# Patient Record
Sex: FEMALE | Race: WHITE | Employment: FULL TIME | URBAN - METROPOLITAN AREA
[De-identification: names, ages, dates, MRNs, and addresses within clinical notes are randomized per-mention and may not be internally consistent; named-entity substitution may affect disease eponyms.]

---

## 2018-01-08 PROBLEM — Q25.0 PATENT DUCTUS ARTERIOSUS: Status: ACTIVE | Noted: 2018-01-08

## 2018-01-15 ENCOUNTER — HOSPITAL ENCOUNTER (OUTPATIENT)
Dept: CT IMAGING | Age: 58
Discharge: HOME | End: 2018-01-15
Attending: INTERNAL MEDICINE
Payer: COMMERCIAL

## 2018-01-15 VITALS — WEIGHT: 235 LBS | HEIGHT: 67 IN | BODY MASS INDEX: 36.88 KG/M2

## 2018-01-15 DIAGNOSIS — Q25.0 PATENT DUCTUS ARTERIOSUS: ICD-10-CM

## 2018-01-15 PROCEDURE — 74011000258 HC RX REV CODE- 258: Performed by: INTERNAL MEDICINE

## 2018-01-15 PROCEDURE — 74011636320 HC RX REV CODE- 636/320: Performed by: INTERNAL MEDICINE

## 2018-01-15 PROCEDURE — 71275 CT ANGIOGRAPHY CHEST: CPT

## 2018-01-15 RX ORDER — SODIUM CHLORIDE 0.9 % (FLUSH) 0.9 %
10 SYRINGE (ML) INJECTION
Status: COMPLETED | OUTPATIENT
Start: 2018-01-15 | End: 2018-01-15

## 2018-01-15 RX ADMIN — Medication 10 ML: at 09:33

## 2018-01-15 RX ADMIN — SODIUM CHLORIDE 100 ML: 900 INJECTION, SOLUTION INTRAVENOUS at 09:33

## 2018-01-15 RX ADMIN — IOPAMIDOL 119 ML: 755 INJECTION, SOLUTION INTRAVENOUS at 09:33

## 2019-09-24 PROBLEM — F41.1 GENERALIZED ANXIETY DISORDER: Status: ACTIVE | Noted: 2019-09-24

## 2019-10-17 PROBLEM — Q25.0 PATENT DUCTUS ARTERIOSUS: Status: RESOLVED | Noted: 2018-01-08 | Resolved: 2019-10-17

## 2020-02-18 ENCOUNTER — HOSPITAL ENCOUNTER (OUTPATIENT)
Dept: LAB | Age: 60
Discharge: HOME | End: 2020-02-18

## 2020-02-18 PROCEDURE — 88312 SPECIAL STAINS GROUP 1: CPT

## 2020-02-18 PROCEDURE — 88305 TISSUE EXAM BY PATHOLOGIST: CPT

## 2021-08-24 PROBLEM — E78.5 HYPERLIPIDEMIA: Status: ACTIVE | Noted: 2018-12-27

## 2021-08-24 PROBLEM — E66.01 CLASS 2 SEVERE OBESITY DUE TO EXCESS CALORIES WITH SERIOUS COMORBIDITY AND BODY MASS INDEX (BMI) OF 37.0 TO 37.9 IN ADULT (HCC): Status: ACTIVE | Noted: 2020-12-02

## 2021-08-24 PROBLEM — F32.A ANXIETY AND DEPRESSION: Status: ACTIVE | Noted: 2018-12-27

## 2021-08-24 PROBLEM — I25.10 ATHEROSCLEROSIS OF CORONARY ARTERY: Status: ACTIVE | Noted: 2018-12-27

## 2021-08-24 PROBLEM — M50.30 DEGENERATION OF CERVICAL INTERVERTEBRAL DISC: Status: ACTIVE | Noted: 2021-08-24

## 2021-08-24 PROBLEM — G43.009 MIGRAINE WITHOUT AURA AND WITHOUT STATUS MIGRAINOSUS, NOT INTRACTABLE: Status: ACTIVE | Noted: 2019-09-11

## 2021-08-24 PROBLEM — M15.9 GENERALIZED OSTEOARTHRITIS: Status: ACTIVE | Noted: 2021-08-24

## 2021-08-24 PROBLEM — M17.12 ARTHRITIS OF LEFT KNEE: Status: ACTIVE | Noted: 2020-12-02

## 2021-08-24 PROBLEM — F41.9 ANXIETY AND DEPRESSION: Status: ACTIVE | Noted: 2018-12-27

## 2021-08-24 PROBLEM — M51.36 DEGENERATION OF LUMBAR INTERVERTEBRAL DISC: Status: ACTIVE | Noted: 2021-08-24

## 2021-08-24 PROBLEM — R73.01 IMPAIRED FASTING GLUCOSE: Status: ACTIVE | Noted: 2018-12-27

## 2021-11-24 PROBLEM — D36.9 TUBULAR ADENOMA: Status: ACTIVE | Noted: 2021-11-24

## 2021-11-24 PROBLEM — K64.8 INTERNAL HEMORRHOIDS: Status: ACTIVE | Noted: 2021-11-24

## 2022-03-09 PROBLEM — N95.2 ATROPHIC VAGINITIS: Status: ACTIVE | Noted: 2022-03-09

## 2022-03-18 PROBLEM — K64.8 INTERNAL HEMORRHOIDS: Status: ACTIVE | Noted: 2021-11-24

## 2022-03-18 PROBLEM — E78.5 HYPERLIPIDEMIA: Status: ACTIVE | Noted: 2018-12-27

## 2022-03-19 PROBLEM — M51.369 DEGENERATION OF LUMBAR INTERVERTEBRAL DISC: Status: ACTIVE | Noted: 2021-08-24

## 2022-03-19 PROBLEM — N95.2 ATROPHIC VAGINITIS: Status: ACTIVE | Noted: 2022-03-09

## 2022-03-19 PROBLEM — I25.10 ATHEROSCLEROSIS OF CORONARY ARTERY: Status: ACTIVE | Noted: 2018-12-27

## 2022-03-19 PROBLEM — M15.9 GENERALIZED OSTEOARTHRITIS: Status: ACTIVE | Noted: 2021-08-24

## 2022-03-19 PROBLEM — E66.01 CLASS 2 SEVERE OBESITY DUE TO EXCESS CALORIES WITH SERIOUS COMORBIDITY AND BODY MASS INDEX (BMI) OF 37.0 TO 37.9 IN ADULT (HCC): Status: ACTIVE | Noted: 2020-12-02

## 2022-03-19 PROBLEM — F41.9 ANXIETY AND DEPRESSION: Status: ACTIVE | Noted: 2018-12-27

## 2022-03-19 PROBLEM — E66.812 CLASS 2 SEVERE OBESITY DUE TO EXCESS CALORIES WITH SERIOUS COMORBIDITY AND BODY MASS INDEX (BMI) OF 37.0 TO 37.9 IN ADULT: Status: ACTIVE | Noted: 2020-12-02

## 2022-03-19 PROBLEM — F41.1 GENERALIZED ANXIETY DISORDER: Status: ACTIVE | Noted: 2019-09-24

## 2022-03-19 PROBLEM — M50.30 DEGENERATION OF CERVICAL INTERVERTEBRAL DISC: Status: ACTIVE | Noted: 2021-08-24

## 2022-03-19 PROBLEM — M17.12 ARTHRITIS OF LEFT KNEE: Status: ACTIVE | Noted: 2020-12-02

## 2022-03-19 PROBLEM — F32.A ANXIETY AND DEPRESSION: Status: ACTIVE | Noted: 2018-12-27

## 2022-03-19 PROBLEM — M51.36 DEGENERATION OF LUMBAR INTERVERTEBRAL DISC: Status: ACTIVE | Noted: 2021-08-24

## 2022-03-20 PROBLEM — D36.9 TUBULAR ADENOMA: Status: ACTIVE | Noted: 2021-11-24

## 2022-03-20 PROBLEM — G43.009 MIGRAINE WITHOUT AURA AND WITHOUT STATUS MIGRAINOSUS, NOT INTRACTABLE: Status: ACTIVE | Noted: 2019-09-11

## 2022-03-20 PROBLEM — R73.01 IMPAIRED FASTING GLUCOSE: Status: ACTIVE | Noted: 2018-12-27

## 2022-05-04 ENCOUNTER — APPOINTMENT (RX ONLY)
Dept: URBAN - METROPOLITAN AREA CLINIC 329 | Facility: CLINIC | Age: 62
Setting detail: DERMATOLOGY
End: 2022-05-04

## 2022-05-04 DIAGNOSIS — D22 MELANOCYTIC NEVI: ICD-10-CM

## 2022-05-04 DIAGNOSIS — L82.1 OTHER SEBORRHEIC KERATOSIS: ICD-10-CM

## 2022-05-04 DIAGNOSIS — L57.0 ACTINIC KERATOSIS: ICD-10-CM | Status: INADEQUATELY CONTROLLED

## 2022-05-04 DIAGNOSIS — Z71.89 OTHER SPECIFIED COUNSELING: ICD-10-CM

## 2022-05-04 DIAGNOSIS — D485 NEOPLASM OF UNCERTAIN BEHAVIOR OF SKIN: ICD-10-CM

## 2022-05-04 PROBLEM — D22.39 MELANOCYTIC NEVI OF OTHER PARTS OF FACE: Status: ACTIVE | Noted: 2022-05-04

## 2022-05-04 PROBLEM — D48.5 NEOPLASM OF UNCERTAIN BEHAVIOR OF SKIN: Status: ACTIVE | Noted: 2022-05-04

## 2022-05-04 PROBLEM — D22.5 MELANOCYTIC NEVI OF TRUNK: Status: ACTIVE | Noted: 2022-05-04

## 2022-05-04 PROCEDURE — 11102 TANGNTL BX SKIN SINGLE LES: CPT

## 2022-05-04 PROCEDURE — ? COUNSELING

## 2022-05-04 PROCEDURE — 99203 OFFICE O/P NEW LOW 30 MIN: CPT | Mod: 25

## 2022-05-04 PROCEDURE — ? LIQUID NITROGEN

## 2022-05-04 PROCEDURE — 17000 DESTRUCT PREMALG LESION: CPT | Mod: 59

## 2022-05-04 PROCEDURE — ? FULL BODY SKIN EXAM - DECLINED

## 2022-05-04 PROCEDURE — ? BIOPSY BY SHAVE METHOD

## 2022-05-04 PROCEDURE — ? SUNSCREEN RECOMMENDATIONS

## 2022-05-04 ASSESSMENT — LOCATION SIMPLE DESCRIPTION DERM
LOCATION SIMPLE: LEFT FOREHEAD
LOCATION SIMPLE: UPPER BACK
LOCATION SIMPLE: RIGHT UPPER BACK
LOCATION SIMPLE: LEFT FOREHEAD
LOCATION SIMPLE: RIGHT POSTERIOR UPPER ARM
LOCATION SIMPLE: RIGHT UPPER BACK
LOCATION SIMPLE: LEFT UPPER BACK
LOCATION SIMPLE: RIGHT POSTERIOR UPPER ARM
LOCATION SIMPLE: LEFT EYEBROW
LOCATION SIMPLE: LEFT UPPER BACK

## 2022-05-04 ASSESSMENT — LOCATION DETAILED DESCRIPTION DERM
LOCATION DETAILED: RIGHT SUPERIOR UPPER BACK
LOCATION DETAILED: RIGHT MEDIAL UPPER BACK
LOCATION DETAILED: RIGHT PROXIMAL LATERAL POSTERIOR UPPER ARM
LOCATION DETAILED: LEFT INFERIOR MEDIAL UPPER BACK
LOCATION DETAILED: LEFT INFERIOR MEDIAL UPPER BACK
LOCATION DETAILED: LEFT FOREHEAD
LOCATION DETAILED: LEFT INFERIOR FOREHEAD
LOCATION DETAILED: RIGHT MEDIAL UPPER BACK
LOCATION DETAILED: SUPERIOR THORACIC SPINE
LOCATION DETAILED: LEFT SUPERIOR MEDIAL UPPER BACK
LOCATION DETAILED: LEFT CENTRAL EYEBROW
LOCATION DETAILED: LEFT INFERIOR FOREHEAD
LOCATION DETAILED: LEFT FOREHEAD
LOCATION DETAILED: RIGHT SUPERIOR UPPER BACK
LOCATION DETAILED: RIGHT PROXIMAL POSTERIOR UPPER ARM

## 2022-05-04 ASSESSMENT — LOCATION ZONE DERM
LOCATION ZONE: ARM
LOCATION ZONE: TRUNK
LOCATION ZONE: ARM
LOCATION ZONE: FACE
LOCATION ZONE: TRUNK
LOCATION ZONE: FACE

## 2022-05-04 NOTE — PROCEDURE: LIQUID NITROGEN
Render Post-Care Instructions In Note?: no
Consent: The patient's consent was obtained including but not limited to risks of crusting, scabbing, blistering, scarring, darker or lighter pigmentary change, recurrence, incomplete removal and infection.
Show Aperture Variable?: Yes
Post-Care Instructions: I reviewed with the patient in detail post-care instructions. Patient is to wear sunprotection, and avoid picking at any of the treated lesions. Pt may apply Vaseline to crusted or scabbing areas.
Detail Level: Detailed
Duration Of Freeze Thaw-Cycle (Seconds): 0

## 2022-05-18 ENCOUNTER — APPOINTMENT (RX ONLY)
Dept: URBAN - METROPOLITAN AREA CLINIC 329 | Facility: CLINIC | Age: 62
Setting detail: DERMATOLOGY
End: 2022-05-18

## 2022-05-18 PROBLEM — D04.5 CARCINOMA IN SITU OF SKIN OF TRUNK: Status: ACTIVE | Noted: 2022-05-18

## 2022-05-18 PROCEDURE — 17262 DSTRJ MAL LES T/A/L 1.1-2.0: CPT

## 2022-05-18 PROCEDURE — ? CURETTAGE AND DESTRUCTION

## 2022-05-18 PROCEDURE — ? COUNSELING

## 2022-05-18 NOTE — PROCEDURE: CURETTAGE AND DESTRUCTION
Detail Level: Detailed
Number Of Curettages: 3
Size Of Lesion In Cm: 1.2
Size Of Lesion After Curettage: 1.4
Add Intralesional Injection: No
Total Volume (Ccs): 1
Anesthesia Type: 1% lidocaine with epinephrine
Cautery Type: electrodesiccation
What Was Performed First?: Curettage
Final Size Statement: The size of the lesion after curettage was
Additional Information: (Optional): The wound was cleaned, and a pressure dressing was applied.  The patient received detailed post-op instructions.
Consent was obtained from the patient. The risks, benefits and alternatives to therapy were discussed in detail. Specifically, the risks of infection, scarring, bleeding, prolonged wound healing, nerve injury, incomplete removal, allergy to anesthesia and recurrence were addressed. Alternatives to ED&C, such as: surgical removal and XRT were also discussed.  Prior to the procedure, the treatment site was clearly identified and confirmed by the patient. All components of Universal Protocol/PAUSE Rule completed.
Post-Care Instructions: I reviewed with the patient in detail post-care instructions. Patient is to keep the area dry for 48 hours, and not to engage in any swimming until the area is healed. Should the patient develop any fevers, chills, bleeding, severe pain patient will contact the office immediately.
Bill As A Line Item Or As Units: Line Item

## 2022-06-15 ENCOUNTER — PATIENT MESSAGE (OUTPATIENT)
Dept: INTERNAL MEDICINE CLINIC | Facility: CLINIC | Age: 62
End: 2022-06-15

## 2022-06-15 RX ORDER — CYCLOBENZAPRINE HCL 10 MG
TABLET ORAL
Qty: 30 TABLET | Refills: 3 | Status: SHIPPED | OUTPATIENT
Start: 2022-06-15 | End: 2022-10-26 | Stop reason: SDUPTHER

## 2022-06-16 RX ORDER — LISINOPRIL 5 MG/1
5 TABLET ORAL DAILY
Qty: 90 TABLET | Refills: 3 | Status: SHIPPED | OUTPATIENT
Start: 2022-06-16 | End: 2022-09-14

## 2022-06-16 RX ORDER — PANTOPRAZOLE SODIUM 40 MG/1
40 TABLET, DELAYED RELEASE ORAL
Qty: 90 TABLET | Refills: 3 | Status: SHIPPED | OUTPATIENT
Start: 2022-06-16 | End: 2022-10-26 | Stop reason: ALTCHOICE

## 2022-06-16 NOTE — TELEPHONE ENCOUNTER
Patient needs a refill on lisinopril 20 mg   Patient has been on dexilant for several years but the insurance will not cover it  she may can try Protonix due to cost

## 2022-06-20 RX ORDER — LISINOPRIL 20 MG/1
20 TABLET ORAL DAILY
Qty: 90 TABLET | Refills: 3 | Status: SHIPPED | OUTPATIENT
Start: 2022-06-20 | End: 2022-10-14 | Stop reason: SDUPTHER

## 2022-06-20 RX ORDER — PANTOPRAZOLE SODIUM 40 MG/1
40 TABLET, DELAYED RELEASE ORAL
Qty: 30 TABLET | Refills: 5 | Status: SHIPPED | OUTPATIENT
Start: 2022-06-20 | End: 2022-09-14 | Stop reason: SDUPTHER

## 2022-06-20 NOTE — TELEPHONE ENCOUNTER
From: Dayton Iglesias  Sent: 6/16/2022 4:16 PM EDT  To: [facility] Clinical Staff  Subject: Need refills sent to [phone]    Also ask her about the Protonix instead of Dexilant please

## 2022-06-20 NOTE — TELEPHONE ENCOUNTER
Denis More is too expensive - insurance will cover Pantoprazole at a cheaper co pay - pt wants to switch - ( [employer]

## 2022-06-20 NOTE — TELEPHONE ENCOUNTER
Cayden Bales MD 4 minutes ago (5:04 PM)         Hope you had a good [place]. Lisinopril 5 mg was sent in instead of 20 mg  I have never taken 5 mg dont know how it got in the computer.    I have been taking Lisinopril 20 mg  Please send this in to [facility] in [facility] so I can get the correct meds   I would appreciate it so much

## 2022-07-18 ENCOUNTER — E-VISIT (OUTPATIENT)
Dept: INTERNAL MEDICINE CLINIC | Facility: CLINIC | Age: 62
End: 2022-07-18
Payer: COMMERCIAL

## 2022-07-18 PROCEDURE — 99212 OFFICE O/P EST SF 10 MIN: CPT | Performed by: INTERNAL MEDICINE

## 2022-07-18 RX ORDER — HYDROXYZINE PAMOATE 25 MG/1
25 CAPSULE ORAL 3 TIMES DAILY PRN
Qty: 180 CAPSULE | Refills: 1 | Status: SHIPPED | OUTPATIENT
Start: 2022-07-18 | End: 2022-09-14 | Stop reason: SINTOL

## 2022-07-18 RX ORDER — FLUCONAZOLE 150 MG/1
150 TABLET ORAL ONCE
Qty: 1 TABLET | Refills: 0 | Status: SHIPPED | OUTPATIENT
Start: 2022-07-18 | End: 2022-07-18

## 2022-07-18 ASSESSMENT — LIFESTYLE VARIABLES: SMOKING_STATUS: NO, I'VE NEVER SMOKED

## 2022-07-22 ENCOUNTER — PATIENT MESSAGE (OUTPATIENT)
Dept: INTERNAL MEDICINE CLINIC | Facility: CLINIC | Age: 62
End: 2022-07-22

## 2022-07-22 RX ORDER — FLUCONAZOLE 150 MG/1
150 TABLET ORAL ONCE
Qty: 1 TABLET | Refills: 0 | Status: SHIPPED | OUTPATIENT
Start: 2022-07-22 | End: 2022-07-22

## 2022-07-22 NOTE — TELEPHONE ENCOUNTER
From: Lila Childers  To: Dr. Burt Gramajo: 7/22/2022 4:36 AM EDT  Subject: Follow Up    Sorry to report that my yeast infection from the antibiotic has not resolved & is still driving me crazy. Always takes more than 1 pill to get rid of it. Please send in script to [facility] in [facility] for more than 1 pill     Also, not sure why I had to complete the questionnaire earlier in the week & be charged a fee & didnt receive any help from doing so. I feel it didnt have a place for my main concern which is my right ear still hasnt returned to normal. I still have a dull ache on & off & full hearing has not been restored. I also still have a low roar & popping sounds on & off like youre driving in the mountains. Tonight Im up with a sore throat again. I feel 7 days of an antibiotic was not enough to completely get rid of my ear, sinus infections & crud as I call it. So sorry to have to bother you again.  Please help

## 2022-09-14 ENCOUNTER — OFFICE VISIT (OUTPATIENT)
Dept: INTERNAL MEDICINE CLINIC | Facility: CLINIC | Age: 62
End: 2022-09-14
Payer: COMMERCIAL

## 2022-09-14 VITALS
SYSTOLIC BLOOD PRESSURE: 127 MMHG | RESPIRATION RATE: 18 BRPM | HEIGHT: 67 IN | DIASTOLIC BLOOD PRESSURE: 72 MMHG | HEART RATE: 61 BPM | WEIGHT: 230 LBS | BODY MASS INDEX: 36.1 KG/M2

## 2022-09-14 DIAGNOSIS — I10 PRIMARY HYPERTENSION: Primary | ICD-10-CM

## 2022-09-14 DIAGNOSIS — I25.10 CORONARY ARTERY DISEASE INVOLVING NATIVE HEART WITHOUT ANGINA PECTORIS, UNSPECIFIED VESSEL OR LESION TYPE: ICD-10-CM

## 2022-09-14 DIAGNOSIS — R73.01 IMPAIRED FASTING GLUCOSE: ICD-10-CM

## 2022-09-14 DIAGNOSIS — M15.9 GENERALIZED OSTEOARTHRITIS: ICD-10-CM

## 2022-09-14 DIAGNOSIS — R53.83 FATIGUE, UNSPECIFIED TYPE: ICD-10-CM

## 2022-09-14 DIAGNOSIS — F41.9 ANXIETY DISORDER, UNSPECIFIED TYPE: ICD-10-CM

## 2022-09-14 DIAGNOSIS — G43.009 MIGRAINE WITHOUT AURA AND WITHOUT STATUS MIGRAINOSUS, NOT INTRACTABLE: ICD-10-CM

## 2022-09-14 LAB
ALBUMIN SERPL-MCNC: 4.1 G/DL (ref 3.2–4.6)
ALBUMIN/GLOB SERPL: 1.4 {RATIO} (ref 1.2–3.5)
ALP SERPL-CCNC: 84 U/L (ref 50–136)
ALT SERPL-CCNC: 29 U/L (ref 12–65)
ANION GAP SERPL CALC-SCNC: 2 MMOL/L (ref 4–13)
AST SERPL-CCNC: 13 U/L (ref 15–37)
BASOPHILS # BLD: 0.1 K/UL (ref 0–0.2)
BASOPHILS NFR BLD: 1 % (ref 0–2)
BILIRUB SERPL-MCNC: 0.6 MG/DL (ref 0.2–1.1)
BUN SERPL-MCNC: 12 MG/DL (ref 8–23)
CALCIUM SERPL-MCNC: 9.7 MG/DL (ref 8.3–10.4)
CHLORIDE SERPL-SCNC: 108 MMOL/L (ref 101–110)
CHOLEST SERPL-MCNC: 116 MG/DL
CO2 SERPL-SCNC: 28 MMOL/L (ref 21–32)
CREAT SERPL-MCNC: 0.8 MG/DL (ref 0.6–1)
DIFFERENTIAL METHOD BLD: NORMAL
EOSINOPHIL # BLD: 0.2 K/UL (ref 0–0.8)
EOSINOPHIL NFR BLD: 3 % (ref 0.5–7.8)
ERYTHROCYTE [DISTWIDTH] IN BLOOD BY AUTOMATED COUNT: 13.3 % (ref 11.9–14.6)
EST. AVERAGE GLUCOSE BLD GHB EST-MCNC: 117 MG/DL
GLOBULIN SER CALC-MCNC: 2.9 G/DL (ref 2.3–3.5)
GLUCOSE SERPL-MCNC: 106 MG/DL (ref 65–100)
HBA1C MFR BLD: 5.7 % (ref 4.8–5.6)
HCT VFR BLD AUTO: 43.6 % (ref 35.8–46.3)
HDLC SERPL-MCNC: 49 MG/DL (ref 40–60)
HDLC SERPL: 2.4 {RATIO}
HGB BLD-MCNC: 13.9 G/DL (ref 11.7–15.4)
IMM GRANULOCYTES # BLD AUTO: 0 K/UL (ref 0–0.5)
IMM GRANULOCYTES NFR BLD AUTO: 0 % (ref 0–5)
LDLC SERPL CALC-MCNC: 42.4 MG/DL
LYMPHOCYTES # BLD: 2 K/UL (ref 0.5–4.6)
LYMPHOCYTES NFR BLD: 29 % (ref 13–44)
MCH RBC QN AUTO: 28.9 PG (ref 26.1–32.9)
MCHC RBC AUTO-ENTMCNC: 31.9 G/DL (ref 31.4–35)
MCV RBC AUTO: 90.6 FL (ref 79.6–97.8)
MONOCYTES # BLD: 0.5 K/UL (ref 0.1–1.3)
MONOCYTES NFR BLD: 7 % (ref 4–12)
NEUTS SEG # BLD: 4.2 K/UL (ref 1.7–8.2)
NEUTS SEG NFR BLD: 60 % (ref 43–78)
NRBC # BLD: 0 K/UL (ref 0–0.2)
PLATELET # BLD AUTO: 213 K/UL (ref 150–450)
PMV BLD AUTO: 10.2 FL (ref 9.4–12.3)
POTASSIUM SERPL-SCNC: 4.1 MMOL/L (ref 3.5–5.1)
PROT SERPL-MCNC: 7 G/DL (ref 6.3–8.2)
RBC # BLD AUTO: 4.81 M/UL (ref 4.05–5.2)
SODIUM SERPL-SCNC: 138 MMOL/L (ref 136–145)
TRIGL SERPL-MCNC: 123 MG/DL (ref 35–150)
TSH W FREE THYROID IF ABNORMAL: 1.28 UIU/ML (ref 0.36–3.74)
VLDLC SERPL CALC-MCNC: 24.6 MG/DL (ref 6–23)
WBC # BLD AUTO: 7 K/UL (ref 4.3–11.1)

## 2022-09-14 PROCEDURE — 99214 OFFICE O/P EST MOD 30 MIN: CPT | Performed by: INTERNAL MEDICINE

## 2022-09-14 RX ORDER — LANOLIN ALCOHOL/MO/W.PET/CERES
1000 CREAM (GRAM) TOPICAL DAILY
COMMUNITY

## 2022-09-14 RX ORDER — FAMOTIDINE 20 MG/1
20 TABLET, FILM COATED ORAL NIGHTLY PRN
COMMUNITY

## 2022-09-14 RX ORDER — ACETAMINOPHEN 160 MG
TABLET,DISINTEGRATING ORAL DAILY
COMMUNITY

## 2022-09-14 RX ORDER — BUSPIRONE HYDROCHLORIDE 10 MG/1
10 TABLET ORAL 3 TIMES DAILY
Qty: 90 TABLET | Refills: 5 | Status: SHIPPED | OUTPATIENT
Start: 2022-09-14 | End: 2022-10-14

## 2022-09-14 RX ORDER — CYANOCOBALAMIN 1000 UG/ML
1000 INJECTION INTRAMUSCULAR; SUBCUTANEOUS
Qty: 10 ML | Refills: 0 | Status: SHIPPED | OUTPATIENT
Start: 2022-09-14

## 2022-09-14 RX ORDER — DOCUSATE SODIUM 100 MG/1
100 CAPSULE, LIQUID FILLED ORAL DAILY PRN
COMMUNITY

## 2022-09-14 RX ORDER — ASCORBIC ACID 500 MG
500 TABLET ORAL DAILY
COMMUNITY

## 2022-09-14 RX ORDER — OMEPRAZOLE 20 MG/1
20 CAPSULE, DELAYED RELEASE ORAL DAILY
COMMUNITY
End: 2022-10-26 | Stop reason: ALTCHOICE

## 2022-09-14 RX ORDER — GUAIFENESIN 600 MG/1
1200 TABLET, EXTENDED RELEASE ORAL 2 TIMES DAILY
COMMUNITY

## 2022-09-14 RX ORDER — SENNOSIDES 8.6 MG
2600 CAPSULE ORAL EVERY MORNING
COMMUNITY

## 2022-09-14 ASSESSMENT — ENCOUNTER SYMPTOMS
WHEEZING: 0
NAUSEA: 0
COUGH: 0
BLOOD IN STOOL: 0
DIARRHEA: 1
VOMITING: 0
ABDOMINAL PAIN: 1
SHORTNESS OF BREATH: 0
ANAL BLEEDING: 0
CONSTIPATION: 1

## 2022-09-14 NOTE — PROGRESS NOTES
9/14/2022 6:25 PM  Location:[facility] [address] [facility]  [place]  Patient #:  [id number]  YOB: 1960            History of Present Illness     Chief Complaint   Patient presents with    6 Month Follow-Up       Ms. Marlene Chamberlain is a 58 y.o. female  who presents for the above. Had to stop the Dexilant due to insurance issues. Switched to protonix. Had some nausea on this so took omeprazole along with this at a different time of day and this helped. Has been taking medicine for her stomach since her daughter was born. Had endoscopy within the past 2-3 years. Has been checked for H. Pylori and did not have this. Sometimes eating helps her feel better. Feels very tired. Wonders about taking CoQ-10 due to being on a statin. Has been on Trintellix for a long time. Noticed that initially it was very helpful. Has taken B12 shots in the past.  And wonders if this would help with her anxiety. Wonders about a diet pill. Constipation  Associated symptoms include abdominal pain and diarrhea (has this due to medications). Pertinent negatives include no nausea or vomiting. Abdominal Pain  This is a chronic problem. Associated symptoms include constipation and diarrhea (has this due to medications). Pertinent negatives include no nausea or vomiting. Allergies   Allergen Reactions    Latex Rash    Heparin (Bovine) Anaphylaxis     Past Medical History:   Diagnosis Date    Anxiety and depression 12/27/2018    Arthritis of left knee 12/2/2020    Autoimmune disease ([facility] [address]     fibromyalgia    CAD (coronary artery disease) 2006    mi    Chest pain     Coagulation defects     hx of dic ?  pe    GERD (gastroesophageal reflux disease)     Hypertension     Palpitations 8/22/2016    Aug 2014 - K+ 3.4, Mg 2.1 7 day monitor - single 8 beat run of atrial ectopy, asymptomatic Feb 2016 - 7 day monitor - normal tracing, all symptoms with NSR    PUD (peptic ulcer disease) Thromboembolus ([address]     lle and pe's--had had foot surgery; just one time     Social History     Socioeconomic History    Marital status:      Spouse name: None    Number of children: None    Years of education: None    Highest education level: None   Tobacco Use    Smoking status: Never    Smokeless tobacco: Never   Substance and Sexual Activity    Alcohol use: No     Past Surgical History:   Procedure Laterality Date    CARDIAC CATHETERIZATION      CHOLECYSTECTOMY  2002    HEENT      septoplasty,rhinoplasty    [place] CARDIAC SURG PROCEDURE UNLIST      cath stents     Current Outpatient Medications   Medication Sig Dispense Refill    Multiple Vitamin (MULTIVITAMIN ADULT PO) Take by mouth daily      Cholecalciferol (VITAMIN D3) 50 MCG (2000 UT) CAPS Take by mouth daily      zinc 50 MG CAPS Take by mouth daily      vitamin B-12 (CYANOCOBALAMIN) 1000 MCG tablet Take 1,000 mcg by mouth daily      vitamin C (ASCORBIC ACID) 500 MG tablet Take 500 mg by mouth daily      NONFORMULARY D - mannose - 1500 mg once daily ( otc) for uti's      Misc Natural Products (GLUCOSAMINE CHOND CMP TRIPLE PO) Take by mouth daily      QUERCETIN PO Take 800 mg by mouth daily      Desloratadine (CLARINEX PO) Take by mouth      guaiFENesin (MUCINEX) 600 MG extended release tablet Take 1,200 mg by mouth 2 times daily      Pseudoephedrine HCl (SUDAFED 12 HOUR PO) Take by mouth      docusate sodium (COLACE) 100 MG capsule Take 100 mg by mouth daily as needed for Constipation      acetaminophen (TYLENOL 8 HOUR) 650 MG extended release tablet Take 2,600 mg by mouth every morning 4 (650 mg) tablets every morning      famotidine (PEPCID) 20 MG tablet Take 20 mg by mouth nightly as needed      omeprazole (PRILOSEC) 20 MG delayed release capsule Take 20 mg by mouth daily      cyanocobalamin 1000 MCG/ML injection Inject 1 mL into the muscle every 30 days 10 mL 0    busPIRone (BUSPAR) 10 MG tablet Take 1 tablet by mouth 3 times daily 90 tablet 5 linaclotide (LINZESS) 290 MCG CAPS capsule Take 1 capsule by mouth every morning (before breakfast) 8 capsule 0    lisinopril (PRINIVIL;ZESTRIL) 20 MG tablet Take 1 tablet by mouth daily 90 tablet 3    VORTIoxetine HBr (TRINTELLIX) 20 MG TABS tablet Take 1 tablet by mouth daily 30 tablet 5    pantoprazole (PROTONIX) 40 MG tablet Take 1 tablet by mouth every morning (before breakfast) 90 tablet 3    cyclobenzaprine (FLEXERIL) 10 MG tablet TAKE 1 TABLET BY MOUTH THREE TIMES DAILY AS NEEDED FOR MUSCLE SPASMS 30 tablet 3    aspirin 81 MG EC tablet Take 81 mg by mouth daily      butalbital-acetaminophen-caffeine (FIORICET, ESGIC) -40 MG per tablet Take 1 tablet by mouth every 6 hours as needed      clopidogrel (PLAVIX) 75 MG tablet Take 75 mg by mouth daily      LORazepam (ATIVAN) 0.5 MG tablet Take 0.5 mg by mouth 2 times daily as needed. metoprolol succinate (TOPROL XL) 25 MG extended release tablet Take 25 mg by mouth 2 times daily      potassium chloride (KLOR-CON M) 20 MEQ extended release tablet Take 20 mEq by mouth 2 times daily      rosuvastatin (CRESTOR) 5 MG tablet Take by mouth       No current facility-administered medications for this visit.      Health Maintenance   Topic Date Due    HIV screen  Never done    COVID-19 Vaccine (4 - Booster for Moderna series) 03/23/2022    Flu vaccine (1) 09/01/2022    A1C test (Diabetic or Prediabetic)  08/24/2022    Lipids  11/24/2022    Depression Monitoring  03/09/2023    Breast cancer screen  05/18/2024    Cervical cancer screen  03/09/2027    DTaP/Tdap/Td vaccine (2 - Td or Tdap) 03/20/2028    Colorectal Cancer Screen  11/20/2030    Shingles vaccine  Completed    Hepatitis C screen  Completed    Hepatitis A vaccine  Aged Out    Hepatitis B vaccine  Aged Out    Hib vaccine  Aged Out    Meningococcal (ACWY) vaccine  Aged Out    Pneumococcal 0-64 years Vaccine  Aged Out     Family History   Problem Relation Age of Onset    Alzheimer's Disease Mother     Other Mother         bilateral knee replacement    Heart Disease Father     Coronary Art Dis Father     Diabetes Father              Review of Systems  Review of Systems   Respiratory:  Negative for cough, shortness of breath and wheezing. Cardiovascular:  Negative for chest pain, palpitations and leg swelling. Gastrointestinal:  Positive for abdominal pain, constipation and diarrhea (has this due to medications). Negative for anal bleeding, blood in stool, nausea and vomiting. Has been taking some stool softeners over the past 8 years. Takes six of these and six of bisacodyl every night. /72 (Site: Left Upper Arm, Position: Sitting, Cuff Size: Large Adult)   Pulse 61   Resp 18   Ht 5' 7\" (1.702 m)   Wt 230 lb (104.3 kg)   BMI 36.02 kg/m²       Physical Exam    Physical Exam  Constitutional:       Appearance: Normal appearance. She is obese. She is not ill-appearing. HENT:      Head: Normocephalic. Neck:      Vascular: No carotid bruit. Cardiovascular:      Rate and Rhythm: Normal rate and regular rhythm. Pulmonary:      Effort: Pulmonary effort is normal.      Breath sounds: Normal breath sounds. No wheezing. Abdominal:      General: Abdomen is flat. Palpations: Abdomen is soft. Tenderness: There is generalized abdominal tenderness. Musculoskeletal:      Right lower leg: No edema. Left lower leg: No edema. Neurological:      Mental Status: She is alert and oriented to person, place, and time. Deep Tendon Reflexes:      Reflex Scores:       Patellar reflexes are 2+ on the right side and 2+ on the left side.   Psychiatric:         Mood and Affect: Mood normal.         Behavior: Behavior normal.         Assessment & Plan    Current Outpatient Medications   Medication Sig Dispense Refill    Multiple Vitamin (MULTIVITAMIN ADULT PO) Take by mouth daily      Cholecalciferol (VITAMIN D3) 50 MCG (2000 UT) CAPS Take by mouth daily      zinc 50 MG CAPS Take by mouth daily mouth 2 times daily as needed. metoprolol succinate (TOPROL XL) 25 MG extended release tablet Take 25 mg by mouth 2 times daily      potassium chloride (KLOR-CON M) 20 MEQ extended release tablet Take 20 mEq by mouth 2 times daily      rosuvastatin (CRESTOR) 5 MG tablet Take by mouth       No current facility-administered medications for this visit.        Orders Placed This Encounter   Procedures    Lipid Panel     Standing Status:   Future     Number of Occurrences:   1     Standing Expiration Date:   9/14/2023    Comprehensive Metabolic Panel     Standing Status:   Future     Number of Occurrences:   1     Standing Expiration Date:   9/14/2023    Hemoglobin A1C     Standing Status:   Future     Number of Occurrences:   1     Standing Expiration Date:   9/14/2023    CBC with Auto Differential     Standing Status:   Future     Number of Occurrences:   1     Standing Expiration Date:   9/14/2023    TSH with Reflex     Standing Status:   Future     Number of Occurrences:   1     Standing Expiration Date:   9/14/2023       Orders Placed This Encounter   Medications    DISCONTD: linaclotide (LINZESS) 145 MCG capsule     Sig: Take 1 capsule by mouth every morning (before breakfast)     Dispense:  14 capsule     Refill:  0    cyanocobalamin 1000 MCG/ML injection     Sig: Inject 1 mL into the muscle every 30 days     Dispense:  10 mL     Refill:  0    busPIRone (BUSPAR) 10 MG tablet     Sig: Take 1 tablet by mouth 3 times daily     Dispense:  90 tablet     Refill:  5    linaclotide (LINZESS) 290 MCG CAPS capsule     Sig: Take 1 capsule by mouth every morning (before breakfast)     Dispense:  8 capsule     Refill:  0         Medications Discontinued During This Encounter   Medication Reason    azithromycin (ZITHROMAX) 250 MG tablet LIST CLEANUP    pantoprazole (PROTONIX) 40 MG tablet DUPLICATE    predniSONE 10 MG (21) TBPK LIST CLEANUP    acetaminophen (TYLENOL) 500 MG tablet LIST CLEANUP    dexlansoprazole (DEXILANT) 60 MG CPDR delayed release capsule LIST CLEANUP    lisinopril (PRINIVIL;ZESTRIL) 5 MG tablet LIST CLEANUP    nystatin (MYCOSTATIN) 095789 UNIT/ML suspension LIST CLEANUP    linaclotide (LINZESS) 145 MCG capsule DOSE ADJUSTMENT    hydrOXYzine pamoate (VISTARIL) 25 MG capsule Side effects        Diagnosis Orders   1. Primary hypertension  Lipid Panel    Comprehensive Metabolic Panel    CBC with Auto Differential    CBC with Auto Differential    Comprehensive Metabolic Panel    Lipid Panel      2. Coronary artery disease involving native heart without angina pectoris, unspecified vessel or lesion type  CBC with Auto Differential    CBC with Auto Differential      3. Migraine without aura and without status migrainosus, not intractable        4. Impaired fasting glucose  Hemoglobin A1C    Hemoglobin A1C      5. Generalized osteoarthritis  CBC with Auto Differential    CBC with Auto Differential      6. Anxiety disorder, unspecified type        7. Fatigue, unspecified type  TSH with Reflex    TSH with Reflex         It seems possible that this patient has fairly significant IBS. Advised her to try the Linzess as above and to stop bisacodyl in combination with docusate. If she does not tolerate Linzess or if it is ineffective, she can start by reducing from 6 pills of each tonight to 5 pills nightly and gradually decrease over time down to hopefully just to of each nightly. Will discuss labs over the phone. I advised her to go ahead and contact her gastroenterologist for follow-up as well. Diet, exercise and weight loss recommended. Recommended 30 minutes of aerobic exercise at least 4-5 days weekly for physical as well as emotional reasons. Advised her to stop hydroxyzine secondary to her fatigue. Advised that she try buspirone instead. Will maintain close follow-up. Follow-up as above or earlier if needed. Return in about 6 weeks (around 10/26/2022).           Annalise Davila MD

## 2022-09-19 RX ORDER — CLOPIDOGREL BISULFATE 75 MG/1
75 TABLET ORAL DAILY
Qty: 30 TABLET | Refills: 3 | Status: SHIPPED | OUTPATIENT
Start: 2022-09-19 | End: 2022-10-14 | Stop reason: SDUPTHER

## 2022-09-19 NOTE — TELEPHONE ENCOUNTER
Medication Refill Request      Name of Medication : Plavix      Strength of Medication: 75 mg      Directions: 1 daily am      30 day or 90 day supply: 90      Preferred Pharmacy: [facility]    Additional Information For Provider:
Needs to get this from her heart doctor. I refilled, but she needs to schedule follow up appointment with cardiologist to get this through them as well as have regular follow up. Thanks.   Vincenzo Lee
02-Jul-2021 19:35

## 2022-10-13 RX ORDER — METOPROLOL SUCCINATE 25 MG/1
TABLET, EXTENDED RELEASE ORAL
Qty: 180 TABLET | Refills: 3 | Status: SHIPPED | OUTPATIENT
Start: 2022-10-13 | End: 2022-10-14 | Stop reason: SDUPTHER

## 2022-10-14 ENCOUNTER — OFFICE VISIT (OUTPATIENT)
Dept: CARDIOLOGY CLINIC | Age: 62
End: 2022-10-14
Payer: COMMERCIAL

## 2022-10-14 VITALS
HEIGHT: 67 IN | WEIGHT: 229.7 LBS | SYSTOLIC BLOOD PRESSURE: 130 MMHG | HEART RATE: 79 BPM | DIASTOLIC BLOOD PRESSURE: 80 MMHG | BODY MASS INDEX: 36.05 KG/M2

## 2022-10-14 DIAGNOSIS — E78.2 MIXED HYPERLIPIDEMIA: ICD-10-CM

## 2022-10-14 DIAGNOSIS — I10 ESSENTIAL HYPERTENSION: ICD-10-CM

## 2022-10-14 DIAGNOSIS — R00.2 PALPITATIONS: Primary | ICD-10-CM

## 2022-10-14 DIAGNOSIS — F41.1 GENERALIZED ANXIETY DISORDER: ICD-10-CM

## 2022-10-14 DIAGNOSIS — I25.10 CORONARY ARTERY DISEASE INVOLVING NATIVE HEART WITHOUT ANGINA PECTORIS, UNSPECIFIED VESSEL OR LESION TYPE: ICD-10-CM

## 2022-10-14 PROCEDURE — 99214 OFFICE O/P EST MOD 30 MIN: CPT | Performed by: INTERNAL MEDICINE

## 2022-10-14 PROCEDURE — 93000 ELECTROCARDIOGRAM COMPLETE: CPT | Performed by: INTERNAL MEDICINE

## 2022-10-14 RX ORDER — METOPROLOL SUCCINATE 25 MG/1
TABLET, EXTENDED RELEASE ORAL
Qty: 180 TABLET | Refills: 3 | Status: SHIPPED | OUTPATIENT
Start: 2022-10-14

## 2022-10-14 RX ORDER — ROSUVASTATIN CALCIUM 5 MG/1
5 TABLET, COATED ORAL DAILY
Qty: 90 TABLET | Refills: 3 | Status: SHIPPED | OUTPATIENT
Start: 2022-10-14

## 2022-10-14 RX ORDER — CLOPIDOGREL BISULFATE 75 MG/1
75 TABLET ORAL DAILY
Qty: 90 TABLET | Refills: 3 | Status: SHIPPED | OUTPATIENT
Start: 2022-10-14

## 2022-10-14 RX ORDER — LISINOPRIL 20 MG/1
20 TABLET ORAL DAILY
Qty: 90 TABLET | Refills: 3 | Status: SHIPPED | OUTPATIENT
Start: 2022-10-14

## 2022-10-14 ASSESSMENT — ENCOUNTER SYMPTOMS: SHORTNESS OF BREATH: 0

## 2022-10-14 NOTE — PROGRESS NOTES
[facility] CARDIOLOGY  [id number]  [address]  PHONE: [phone]      10/14/22    NAME:  Irvin Jama  : 1960  MRN: [id number]         SUBJECTIVE:   Irvin Jama is a 58 y.o. female seen for a follow up visit regarding the following:     Chief Complaint   Patient presents with    Palpitations              HPI:  Follow up  Palpitations   . Follow up long standing palpitations without significant arrhythmia. Chronic BB therapy. She's been doing well, keeping her young grandchildren, active with them but no other structured exercise. Her biggest struggle remains anxiety. Her prior psychiatrist left the area. She continues with her meds but would like to see a counselor. Manifests itself as racing thoughts, with panic attacks including heart racing, resolves with deep breathing. Feels uncomfortable being alone. Looking to find places to volunteer and keep herself busy. No angina       Past cardiac history:    - provoked PE after ankle fracture - AC and IVC filter    - stent to Dx   Subsequent cath - patent stent    - Cath - patent stent, minimal disease otherwise    - 7 day monitor - single 8 beat run of atrial ectopy, asymptomatic   2016 - 7 day monitor - normal tracing, all symptoms with NSR   2016 - Stress MPI normal stage III   Dec 2017 - Echo otherwise normal suggests flow between PA and aorta just distal to SCA consistent with PDA. CTA ordered, shunt fraction 1:4   CTA - essentially normal study. Ductus \"bump\" is noted, possible small PDA but cannot confirm patency on CT. Normal vascular sizes, structures, filling and drainage. Sep 2019- 7 day monitor - normal tracing, 9 beats asymptomatic atrial tachycardia.   Symptoms fail to correlate with any abnormality   2021 14 day monitor - NSR with occasional PVC/rare PAC, symptoms reported sometimes with ectopy, sometimes with NSR             Key CAD CHF Meds metoprolol succinate (TOPROL XL) 25 MG extended release tablet (Taking)    Sig: TAKE 1 TABLET BY MOUTH TWICE DAILY    lisinopril (PRINIVIL;ZESTRIL) 20 MG tablet (Taking)    Sig - Route: Take 1 tablet by mouth daily - Oral    rosuvastatin (CRESTOR) 5 MG tablet (Taking)    Sig - Route: Take 1 tablet by mouth daily - Oral              Past Medical History, Past Surgical History, Family history, Social History, and Medications were all reviewed with the patient today and updated as necessary. Prior to Admission medications    Medication Sig Start Date End Date Taking?  Authorizing Provider   metoprolol succinate (TOPROL XL) 25 MG extended release tablet TAKE 1 TABLET BY MOUTH TWICE DAILY 10/14/22  Yes Asya Gunn MD   clopidogrel (PLAVIX) 75 MG tablet Take 1 tablet by mouth daily 10/14/22  Yes Asya Gunn MD   lisinopril (PRINIVIL;ZESTRIL) 20 MG tablet Take 1 tablet by mouth daily 10/14/22  Yes Asya Gunn MD   rosuvastatin (CRESTOR) 5 MG tablet Take 1 tablet by mouth daily 10/14/22  Yes Asya Gunn MD   Multiple Vitamin (MULTIVITAMIN ADULT PO) Take by mouth daily   Yes Historical Provider, MD   Cholecalciferol (VITAMIN D3) 50 MCG (2000 UT) CAPS Take by mouth daily   Yes Historical Provider, MD   zinc 50 MG CAPS Take by mouth daily   Yes Historical Provider, MD   vitamin B-12 (CYANOCOBALAMIN) 1000 MCG tablet Take 1,000 mcg by mouth daily   Yes Historical Provider, MD   vitamin C (ASCORBIC ACID) 500 MG tablet Take 500 mg by mouth daily   Yes Historical Provider, MD   NONFORMULARY D - mannose - 1500 mg once daily ( otc) for uti's   Yes Historical Provider, MD   Misc Natural Products (GLUCOSAMINE CHOND CMP TRIPLE PO) Take by mouth daily   Yes Historical Provider, MD   QUERCETIN PO Take 800 mg by mouth daily   Yes Historical Provider, MD   guaiFENesin (MUCINEX) 600 MG extended release tablet Take 1,200 mg by mouth 2 times daily   Yes Historical Provider, MD   Pseudoephedrine HCl (SUDAFED 12 HOUR PO) Take by mouth   Yes Historical Provider, MD   docusate sodium (COLACE) 100 MG capsule Take 100 mg by mouth daily as needed for Constipation   Yes Historical Provider, MD   acetaminophen (TYLENOL) 650 MG extended release tablet Take 2,600 mg by mouth every morning 4 (650 mg) tablets every morning   Yes Historical Provider, MD   famotidine (PEPCID) 20 MG tablet Take 20 mg by mouth nightly as needed   Yes Historical Provider, MD   omeprazole (PRILOSEC) 20 MG delayed release capsule Take 20 mg by mouth daily   Yes Historical Provider, MD   cyanocobalamin 1000 MCG/ML injection Inject 1 mL into the muscle every 30 days 9/14/22  Yes Isacc Cintron MD   busPIRone (BUSPAR) 10 MG tablet Take 1 tablet by mouth 3 times daily 9/14/22 10/14/22 Yes Isacc Cintron MD   VORTIoxetine HBr (TRINTELLIX) 20 MG TABS tablet Take 1 tablet by mouth daily 6/16/22  Yes Lucia Negron MD   pantoprazole (PROTONIX) 40 MG tablet Take 1 tablet by mouth every morning (before breakfast) 6/16/22  Yes Lucia Negron MD   cyclobenzaprine (FLEXERIL) 10 MG tablet TAKE 1 TABLET BY MOUTH THREE TIMES DAILY AS NEEDED FOR MUSCLE SPASMS 6/15/22  Yes Torie Crawford MD   aspirin 81 MG EC tablet Take 81 mg by mouth daily 8/15/17  Yes Ar Automatic Reconciliation   butalbital-acetaminophen-caffeine (FIORICET, ESGIC) -40 MG per tablet Take 1 tablet by mouth every 6 hours as needed 3/9/22  Yes Ar Automatic Reconciliation   LORazepam (ATIVAN) 0.5 MG tablet Take 0.5 mg by mouth 2 times daily as needed.  3/9/22  Yes Ar Automatic Reconciliation   potassium chloride (KLOR-CON M) 20 MEQ extended release tablet Take 20 mEq by mouth 2 times daily 8/15/17  Yes Ar Automatic Reconciliation   Desloratadine (CLARINEX PO) Take by mouth  Patient not taking: Reported on 10/14/2022    Historical Provider, MD   linaclotide (LINZESS) 290 MCG CAPS capsule Take 1 capsule by mouth every morning (before breakfast)  Patient not taking: Reported on 10/14/2022 9/14/22   Mauro Ha MD     Allergies   Allergen Reactions    Latex Rash    Heparin (Bovine) Anaphylaxis     Past Medical History:   Diagnosis Date    Anxiety and depression 12/27/2018    Arthritis of left knee 12/2/2020    Autoimmune disease ([facility] [address]     fibromyalgia    CAD (coronary artery disease) 2006    mi    Chest pain     Coagulation defects     hx of dic ? pe    GERD (gastroesophageal reflux disease)     Hypertension     Palpitations 8/22/2016    Aug 2014 - K+ 3.4, Mg 2.1 7 day monitor - single 8 beat run of atrial ectopy, asymptomatic Feb 2016 - 7 day monitor - normal tracing, all symptoms with NSR    PUD (peptic ulcer disease)     Thromboembolus ([address]     lle and pe's--had had foot surgery; just one time     Past Surgical History:   Procedure Laterality Date    CARDIAC CATHETERIZATION      CHOLECYSTECTOMY  2002    HEENT      septoplasty,rhinoplasty    NE CARDIAC SURG PROCEDURE UNLIST      cath stents     Family History   Problem Relation Age of Onset    Alzheimer's Disease Mother     Other Mother         bilateral knee replacement    Heart Disease Father     Coronary Art Dis Father     Diabetes Father      Social History     Tobacco Use    Smoking status: Never    Smokeless tobacco: Never   Substance Use Topics    Alcohol use: No       ROS:    Review of Systems   Cardiovascular:  Negative for chest pain. Respiratory:  Negative for shortness of breath. Psychiatric/Behavioral:  The patient is nervous/anxious.          PHYSICAL EXAM:   /80   Pulse 79   Ht 5' 7\" (1.702 m)   Wt 229 lb 11.2 oz (104.2 kg)   BMI 35.98 kg/m²      Wt Readings from Last 3 Encounters:   10/14/22 229 lb 11.2 oz (104.2 kg)   09/14/22 230 lb (104.3 kg)   03/09/22 233 lb (105.7 kg)     BP Readings from Last 3 Encounters:   10/14/22 130/80   09/14/22 127/72   03/09/22 134/73     Pulse Readings from Last 3 Encounters:   10/14/22 79   09/14/22 61   03/09/22 82           Physical Exam  Constitutional: Appearance: Normal appearance. HENT:      Head: Normocephalic and atraumatic. Eyes:      Conjunctiva/sclera: Conjunctivae normal.   Neck:      Vascular: No carotid bruit. Cardiovascular:      Rate and Rhythm: Normal rate and regular rhythm. Heart sounds: No murmur heard. No friction rub. No gallop. Pulmonary:      Effort: No respiratory distress. Breath sounds: No wheezing or rales. Musculoskeletal:         General: No swelling. Cervical back: Neck supple. Skin:     General: Skin is warm and dry. Neurological:      General: No focal deficit present. Mental Status: She is alert. Psychiatric:         Mood and Affect: Mood normal.         Behavior: Behavior normal.       Medical problems and test results were reviewed with the patient today. DATA REVIEW    LIPID PANEL     Lab Results   Component Value Date    CHOL 116 09/14/2022    CHOL 113 11/24/2021     Lab Results   Component Value Date    TRIG 123 09/14/2022    TRIG 138 11/24/2021     Lab Results   Component Value Date    HDL 49 09/14/2022    HDL 41 11/24/2021     Lab Results   Component Value Date    LDLCALC 42.4 09/14/2022    LDLCALC 48 11/24/2021     Lab Results   Component Value Date    LABVLDL 24.6 (H) 09/14/2022    VLDL 24 11/24/2021     Lab Results   Component Value Date    CHOLHDLRATIO 2.4 09/14/2022       BMP  Lab Results   Component Value Date/Time     09/14/2022 12:00 PM    K 4.1 09/14/2022 12:00 PM     09/14/2022 12:00 PM    CO2 28 09/14/2022 12:00 PM    BUN 12 09/14/2022 12:00 PM    CREATININE 0.80 09/14/2022 12:00 PM    GLUCOSE 106 09/14/2022 12:00 PM    CALCIUM 9.7 09/14/2022 12:00 PM          EKG    Sinus  Rhythm 79  normal axis, intervals  nstwf      CXR/IMAGING        DEVICE INTERROGATION        OUTSIDE RECORDS REVIEW    Records from outside providers have been reviewed and summarized as noted in the HPI, past history and data review sections of this note, and reviewed with patient. Dawood Noriega ASSESSMENT and PLAN    Talat Bonilla was seen today for palpitations. Diagnoses and all orders for this visit:    Palpitations  -     EKG 12 Lead    Coronary artery disease involving native heart without angina pectoris, unspecified vessel or lesion type    Mixed hyperlipidemia    Essential hypertension    Generalized anxiety disorder  -     [address] (Counseling)    Other orders  -     metoprolol succinate (TOPROL XL) 25 MG extended release tablet; TAKE 1 TABLET BY MOUTH TWICE DAILY  -     clopidogrel (PLAVIX) 75 MG tablet; Take 1 tablet by mouth daily  -     lisinopril (PRINIVIL;ZESTRIL) 20 MG tablet; Take 1 tablet by mouth daily  -     rosuvastatin (CRESTOR) 5 MG tablet; Take 1 tablet by mouth daily        IMPRESSION:    Cardiac status is stable, encouraged efforts to exercise and discussed methods with knee arthritis    BP at target, continue meds    Lipids at goal, continue meds      She requests counseling referral I'm happy to provide, though I know no providers in her area, she will see if [facility] providers will work or will check around her local area and let us know        Return in about 1 year (around 10/14/2023). Thank you for allowing me to participate in this patient's care. Please call or contact me if there are any questions or concerns regarding the above.       Jessica Kate MD  10/14/22  4:04 PM

## 2022-10-14 NOTE — PATIENT INSTRUCTIONS
Patient Education        Learning About Mindfulness for Stress  What are mindfulness and stress? Stress is what you feel when you have to handle more than you are used to. A lot of things can cause stress. You may feel stress when you go on a job interview, take a test, or run a race. This kind of short-term stress is normal and even useful. It can help you if you need to work hard or react quickly. Stress also can last a long time. Long-term stress is caused by stressful situations or events. Examples of long-term stress include long-term health problems, ongoing problems at work, and conflicts in your family. Long-term stress can harm your health. Mindfulness is a focus only on things happening in the present moment. It's a process of purposefully paying attention to and being aware of your surroundings, your emotions, your thoughts, and how your body feels. You are aware of these things, but you aren't judging these experiences as \"good\" or \"bad. \" Mindfulness can help you learn to calm your mind and body to help you cope with illness, pain, and stress. How does mindfulness help to relieve stress? Mindfulness can help quiet your mind and relax your body. Studies show that it can help some people sleep better, feel less anxious, and bring their blood pressure down. And it's been shown to help some people live and cope better with certain health problems like heart disease, depression, chronic pain, and cancer. How do you practice mindfulness? To be mindful is to pay attention, to be present, and to be accepting. When you're mindful, you do just one thing and you pay close attention to that one thing. For example, you may sit quietly and notice your emotions or how your food tastes and smells. When you're present, you focus on the things that are happening right now. You let go of your thoughts about the past and the future.  When you dwell on the past or the future, you miss moments that can heal and strengthen you. You may miss moments like hearing a child laugh or seeing a friendly face when you think you're all alone. When you're accepting, you don't  the present moment. Instead you accept your thoughts and feelings as they come. You can practice anytime, anywhere, and in any way you choose. You can practice in many ways. Here are a few ideas:  While doing your chores, like washing the dishes, let your mind focus on what's in your hand. What does the dish feel like? Is the water warm or cold? Go outside and take a few deep breaths. What is the air like? Is it warm or cold? When you can, take some time at the start of your day to sit alone and think. Take a slow walk by yourself. Count your steps while you breathe in and out. Try yoga breathing exercises, stretches, and poses to strengthen and relax your muscles. At work, if you can, try to stop for a few moments each hour. Note how your body feels. Let yourself regroup and let your mind settle before you return to what you were doing. If you struggle with anxiety or \"worry thoughts,\" imagine your mind as a blue danii and your worry thoughts as clouds. Now imagine those worry thoughts floating across your mind's danii. Just let them pass by as you watch. Follow-up care is a key part of your treatment and safety. Be sure to make and go to all appointments, and call your doctor if you are having problems. It's also a good idea to know your test results and keep a list of the medicines you take. Where can you learn more? Go to [email or web address] and sign in to your [employer] account. Enter W254 in the G[employer]ight box to learn more about \"Learning About Mindfulness for Stress. \"     If you do not have an account, please click on the \"Sign Up Now\" link. Current as of: February 9, 2022               Content Version: 13.4  © 1055-5420 Healthwise, Incorporated. Care instructions adapted under license by [facility] ([facility]).  If you have questions about a medical condition or this instruction, always ask your healthcare professional. [address] any warranty or liability for your use of this information. Patient Education        Kenia Carpenter de la dieta mediterránea  Learning About the [address] Diet  ¿Qué es la dieta mediterránea? La dieta mediterránea es un estilo de alimentación, en lugar de un plan de dieta. Consiste en alimentos consumidos en Emma Pellant, el rukhsana de [place] y [place], y otros países a lo kwadwo del [facility]. Resalta comer alimentos daljit pescado, frutas, verduras, frijoles (habichuelas), panes ricos en fibra y granos integrales, franco secos y aceite de [place]. Pratima estilo de alimentación incluye comer cantidades limitadas de carne Randye Camilo y dulces. ¿Por qué elegir la dieta mediterránea? Camila Hammans de estilo mediterráneo puede mejorar la poncho del corazón. Contiene más grasa que otras dietas saludables para el corazón. Jone las grasas provienen principalmente de franco secos, aceites no saturados (daljit los aceites del pescado y el aceite de [place]) y ciertos aceites de franco secos o semillas (tales daljit el aceite de canola, soya o [place]). Estas grasas pueden ayudar a proteger Donney Grew y los vasos sanguíneos. ¿Cómo puede usted comenzar kelby dieta mediterránea? Estas son algunas cosas que usted puede hacer para cambiar a un estilo de alimentación más similar a la dieta mediterránea. Qué comer  Coma kelby variedad de frutas y verduras cada día, tales daljit uvas, arándanos, tomates, brócoli, pimientos, higos, aceitunas, espinacas, berenjenas, frijoles, lentejas y garbanzos. Coma kelby variedad de alimentos de grano integral cada día, tales daljit breanna, arroz integral y pan, pasta y cuscús integrales. Coma pescado al menos 2 veces a la semana. Pruebe el atún, el salmón, la caballa, la [place], el arenque o las ronda.   Consuma cantidades moderadas de productos lácteos bajos en grasa, daljit Poonam Schillings o yogur. Consuma cantidades moderadas de aves y [place]. Elija grasas saludables (insaturadas), daljit franco secos, aceite de partida y ciertos aceites de franco secos o semillas, daljit el de canola (colza), soya y Stormy Valpar[place]o. Limite las grasas no saludables (saturadas), daljit [place], aceite de cueva y aceite de [place]. Y limite las grasas de origen animal, daljit carne y productos lácteos elaborados con Kaleen Ramp entera. Trate de comer kelsie  solo unas pocas veces al mes en cantidades muy pequeñas. Limite los dulces y postres a solo un par de veces a la semana. [place] incluye bebidas BluPanda. La dieta mediterránea también puede incluir vino tinto con las comidas: 1 vaso cada día para las mujeres y WATZING 2 vasos al día para los hombres. Consejos al comer en casa  Utilice hierbas, especias, ajo, corteza de massiel y [place] de cítricos en lugar de sal para jono sabor a los alimentos. Añada rodajas de aguacate a delacruz sándwich en lugar de tocino. Consuma pescado en el almuerzo o la fernando en lugar de carne Andi Shackleton. Frote el pescado con aceite de partida y cocínelo al horno o a la jessica. [place]vore[place] la ensalada con semillas o franco secos en lugar de queso. Cocine con aceite de partida o de canola en lugar de mantequilla o aceites con alto contenido de grasas saturadas. Cambie de Kaleen Ramp al 2% o entera a leche al 1% o descremada. Unte las verduras crudas en un aderezo de vinagreta o puré de garbanzos en lugar de salsas hechas de mayonesa o crema agria. Disfrute un pedazo de fruta para el postre en lugar de un pedazo de torta. Pruebe manzanas al horno, o coma algunas frutas secas. Consejos al comer afuera  Pruebe pescado cocido, asado a la jessica u horneado, en lugar de comerlo frito o empanado. Pídale al vianey ([facility]) que le preparen la comida con aceite de partida en [facility] de [place]. Pida platos hechos con salsa marinera o salsas hechas con aceite de partida. Evite las salsas hechas con Terrie Klein. Elija panes integrales, pasta y masa de pizza hechas de meliza integral, arroz integral, frijoles y lentejas. Reduzca el consumo de mantequilla o margarina en el pan. En delacruz lugar, puede untar el pan en kelby pequeña cantidad de aceite de partida. [place] acompañamiento, pida kelby ensalada pequeña o verduras asadas en lugar de liz fritas. ¿Dónde puede encontrar más información en inglés? Mercedes Cavazos a [email or web address] e ingrese a delacruz cuenta de MyChart. Bernardino Jung P978 en el [facility] \"Search Health Information\" para más información (en inglés) sobre \"Aprenda acerca de la dieta mediterránea. \"     Si no tiene kelby cuenta, brittney frank en el enlace \"Sign Up Now\". Revisado: 5 [place], 46               Versión del contenido: 13.4  © 6330-7157 Healthwise, Incorporated. Las instrucciones de cuidado fueron adaptadas bajo licencia por [facility] CARE ([facility]). Si usted tiene [place]in [place] afección médica o sobre estas instrucciones, siempre pregunte a delacruz profesional de poncho. Healthwise, Incorporated niega toda garantía o responsabilidad por delacruz uso de esta información. Patient Education        Cyrus Ward de la dieta mediterránea  Learning About the [address] Diet  ¿Qué es la dieta mediterránea? La dieta mediterránea es un estilo de alimentación, en lugar de un plan de dieta. Consiste en alimentos consumidos en Colan D[place], el rukhsana de [place] y [place], y otros países a lo kwadwo del [facility]. Resalta comer alimentos daljit pescado, frutas, verduras, frijoles (habichuelas), panes ricos en fibra y granos integrales, franco secos y aceite de [place]. Pratima estilo de alimentación incluye comer cantidades limitadas de wily Ortiz. ¿Por qué elegir la dieta mediterránea? Mayra Ally de estilo mediterráneo puede mejorar la poncho del corazón. Contiene más grasa que otras dietas saludables para el corazón.  Jone las grasas provienen principalmente de franco secos, aceites no saturados (daljit los aceites del pescado y el aceite de [place]) y ciertos aceites de franco secos o semillas (tales daljit el aceite de canola, soya o Maurice Gins). Estas grasas pueden ayudar a proteger Delrae Plater y los vasos sanguíneos. ¿Cómo puede usted comenzar kelby dieta mediterránea? Estas son algunas cosas que usted puede hacer para cambiar a un estilo de alimentación más similar a la dieta mediterránea. Qué comer  Coma kelby variedad de frutas y verduras cada día, tales daljit uvas, arándanos, tomates, brócoli, pimientos, higos, aceitunas, espinacas, berenjenas, frijoles, lentejas y garbanzos. Coma kelby variedad de alimentos de grano integral cada día, tales daljit breanna, arroz integral y pan, pasta y cuscús integrales. Coma pescado al menos 2 veces a la semana. Pruebe el atún, el salmón, la caballa, la [place], el arenque o las ronda. Consuma cantidades moderadas de productos lácteos bajos en grasa, daljit Isabel Moder o yogur. Consuma cantidades moderadas de aves y [place]. Elija grasas saludables (insaturadas), daljit franco secos, aceite de partida y ciertos aceites de franco secos o semillas, daljit el de canola (colza), soya y Troy Gins. Limite las grasas no saludables (saturadas), daljit [place], aceite de cueva y aceite de [place]. Y limite las grasas de origen animal, daljit carne y productos lácteos elaborados con Amrita Parmar entersidra. Trate de comer kelsie  solo unas pocas veces al mes en cantidades muy pequeñas. Limite los dulces y postres a solo un par de veces a la semana. [place] incluye bebidas [place]. La dieta mediterránea también puede incluir vino tinto con las comidas: 1 vaso cada día para las mujeres y WATZING 2 vasos al día para los hombres. Consejos al comer en casa  Utilice hierbas, especias, ajo, corteza de massiel y [place] de cítricos en lugar de sal para jono sabor a los alimentos. Añada rodajas de aguacate a delacruz sándwich en lugar de tocino.   Consuma pescado en el almuerzo o la fernando en lugar de [facility]. Frote el pescado con aceite de partida y cocínelo al horno o a la jessica. [place]voree la ensalada con semillas o franco secos en lugar de [facility]. Cocine con aceite de partida o de canola en lugar de mantequilla o aceites con alto contenido de grasas saturadas. Cambie de [place] al 2% o entera a leche al 1% o descremada. Unte las verduras crudas en un aderezo de vinagreta o puré de garbanzos en lugar de salsas hechas de mayonesa o crema agria. Disfrute un pedazo de fruta para el postre en lugar de un pedazo de torta. Pruebe manzanas al horno, o coma algunas frutas secas. Consejos al comer afuera  Pruebe pescado cocido, asado a la jessica u horneado, en lugar de comerlo frito o empanado. Pídale al camarero ([facility]) que le preparen la comida con aceite de partida en gar [facility]. Pida platos hechos con salsa marinera o salsas hechas con aceite de partida. Evite las salsas hechas con Prudy Monae. Elija panes integrales, pasta y masa de pizza hechas de meliza integral, arroz integral, frijoles y lentejas. Reduzca el consumo de mantequilla o margarina en el pan. En delacruz lugar, puede untar el pan en kelby pequeña cantidad de aceite de partida. Adam acompañamiento, pida kelby ensalada pequeña o verduras asadas en lugar de liz fritas. ¿Dónde puede encontrar más información en inglés? Andres Tellez a [email or web address] e ingrese a delacruz cuenta de Niurka Huitron I288 en el Kasia Ailin \"Search Health Information\" para más información (en inglés) sobre \"Aprenda acerca de la dieta mediterránea. \"     Si no tiene kelby cuenta, brittney frank en el enlace \"Sign Up Now\". Revisado: 5 [place], 46               Versión del contenido: 13.4  © 6164-0088 Healthwise, [employer]. Las instrucciones de cuidado fueron adaptadas bajo licencia por [address] Si usted tiene [place]ee Lismore afección médica o sobre estas instrucciones, siempre pregunte a delacruz profesional de poncho. [facility], Incorporated niega toda garantía o responsabilidad por delacruz uso de esta información. Patient Education        Allegra Baltazar de la dieta mediterránea  Learning About the [address] [facility]  ¿Qué es la dieta mediterránea? La dieta mediterránea es un estilo de alimentación, en lugar de un plan de dieta. Consiste en alimentos consumidos en [facility], el rukhsana de [place] y [facility], y otros países a lo kwadwo del [facility]. Resalta comer alimentos daljit pescado, frutas, verduras, frijoles (habichuelas), panes ricos en fibra y granos integrales, franco secos y aceite de [place]. Pratima estilo de alimentación incluye comer cantidades limitadas de carne Shiraz Bhumi y dulces. ¿Por qué elegir la dieta mediterránea? Alcario Crosser de estilo mediterráneo puede mejorar la poncho del corazón. Contiene más grasa que otras dietas saludables para el corazón. Jone las grasas provienen principalmente de franco secos, aceites no saturados (daljit los aceites del pescado y el aceite de [place]) y ciertos aceites de franco secos o semillas (tales daljit el aceite de canola, soya o Precilla Blower). Estas grasas pueden ayudar a proteger Ramonia Cola y los vasos sanguíneos. ¿Cómo puede usted comenzar kelby dieta mediterránea? Estas son algunas cosas que usted puede hacer para cambiar a un estilo de alimentación más similar a la dieta mediterránea. Qué comer  Coma kelby variedad de frutas y verduras cada día, tales daljit uvas, arándanos, tomates, brócoli, pimientos, higos, aceitunas, espinacas, berenjenas, frijoles, lentejas y garbanzos. Coma kelby variedad de alimentos de grano integral cada día, tales daljit breanna, arroz integral y pan, pasta y cuscús integrales. Coma pescado al menos 2 veces a la semana. Pruebe el atún, el salmón, la caballa, la [place], el arenque o las ronda. Consuma cantidades moderadas de productos lácteos bajos en grasa, daljit Barker Polo o yogur. Consuma cantidades moderadas de aves y [place].   Jonny Bean grasas saludables (insaturadas), daljit franco secos, aceite de partida y ciertos aceites de franco secos o semillas, daljit el de canola (colza), soya y Judeth [place]. Limite las grasas no saludables (saturadas), daljit [place], aceite de cueva y aceite de [place]. Y limite las grasas de origen animal, daljit carne y productos lácteos elaborados con Candy recioa. Trate de comer kelsie  solo unas pocas veces al mes en cantidades muy pequeñas. Limite los dulces y postres a solo un par de veces a la semana. [place] incluye bebidas [employer]. La dieta mediterránea también puede incluir vino tinto con las comidas: 1 vaso cada día para las mujeres y WATZING 2 vasos al día para los hombres. Consejos al comer en casa  Utilice hierbas, especias, ajo, corteza de massiel y [place] de cítricos en lugar de sal para jono sabor a los alimentos. Añada rodajas de aguacate a delacruz sándwich en lugar de tocino. Consuma pescado en el almuerzo o la fernando en lugar de carne [facility]. Frote el pescado con aceite de partida y cocínelo al horno o a la jessica. [place]voree la ensalada con semillas o franco secos en lugar de queso. Cocine con aceite de partida o de canola en lugar de mantequilla o aceites con alto contenido de grasas saturadas. Cambie de Candy Mon[place] al 2% o entera a leche al 1% o descremada. Unte las verduras crudas en un aderezo de vinagreta o puré de garbanzos en lugar de salsas hechas de mayonesa o crema agria. Disfrute un pedazo de fruta para el postre en lugar de un pedazo de torta. Pruebe manzanas al horno, o coma algunas frutas secas. Consejos al comer afuera  Pruebe pescado cocido, asado a la jessica u horneado, en lugar de comerlo frito o empanado. Pídale al [facility]) que le preparen la comida con aceite de partida en lugar de [place]. Pida platos hechos con salsa marinera o salsas hechas con aceite de partida. Evite las salsas hechas con Stephany Hodgkins.   Elija panes integrales, pasta y masa de pizza hechas de meliza integral, arroz integral, frijoles y lentejas. Reduzca el consumo de mantequilla o margarina en el pan. En delacruz lugar, puede untar el pan en kelby pequeña cantidad de aceite de partida. [place] acompañamiento, pida kelby ensalada pequeña o verduras asadas en lugar de liz fritas. ¿Dónde puede encontrar más información en inglés? Ning Bro a [email or web address] e ingrese a delacruz cuenta de MyChart. Jun Mago F987 en el [facility] \"Search Health Information\" para más información (en inglés) sobre \"Aprenda acerca de la dieta mediterránea. \"     Si no tiene kelby cuenta, brittney frank en el enlace \"Sign Up Now\". Revisado: 5 [place], 46               Versión del contenido: 13.4  © 6479-4677 Healthwise, Incorporated. Las instrucciones de cuidado fueron adaptadas bajo licencia por [facility] ([facility]). Si usted tiene [place] afección médica o sobre estas instrucciones, siempre pregunte a delacruz profesional de poncho. Healthwise, Incorporated niega toda garantía o responsabilidad por delacruz uso de esta información. Patient Education        Carrington Dill de la dieta mediterránea  Learning About the [address] Diet  ¿Qué es la dieta mediterránea? La dieta mediterránea es un estilo de alimentación, en lugar de un plan de dieta. Consiste en alimentos consumidos en Esther Thomas, el rukhsana de [place] y [place], y otros países a lo kwadwo del [facility]. Resalta comer alimentos daljit pescado, frutas, verduras, frijoles (habichuelas), panes ricos en fibra y granos integrales, franco secos y aceite de [place]. Pratima estilo de alimentación incluye comer cantidades limitadas de carne Sole Landers y marya. ¿Por qué elegir la dieta mediterránea? Kaushal Sernaehn de estilo mediterráneo puede mejorar la poncho del corazón. Contiene más grasa que otras dietas saludables para el corazón.  Jone las grasas provienen principalmente de franco secos, aceites no saturados (daljit los aceites del pescado y el aceite de [place]) y ciertos aceites de franco secos o semillas (tales daljit el aceite de canola, soya o [place]). Estas grasas pueden ayudar a proteger Liz Honey y los vasos sanguíneos. ¿Cómo puede usted comenzar kelby dieta mediterránea? Estas son algunas cosas que usted puede hacer para cambiar a un estilo de alimentación más similar a la dieta mediterránea. Qué comer  Coma kelby variedad de frutas y verduras cada día, tales daljit uvas, arándanos, tomates, brócoli, pimientos, higos, aceitunas, espinacas, berenjenas, frijoles, lentejas y garbanzos. Coma kelby variedad de alimentos de grano integral cada día, tales daljit breanna, arroz integral y pan, pasta y cuscús integrales. Coma pescado al menos 2 veces a la semana. Pruebe el atún, el salmón, la caballa, la [place]e [place], el arenque o las ronda. Consuma cantidades moderadas de productos lácteos bajos en grasa, daljit Nato Nat o yogur. Consuma cantidades moderadas de aves y [place]. Elija grasas saludables (insaturadas), daljit franco secos, aceite de partida y ciertos aceites de franco secos o semillas, daljit el de canola (colza), soya y [place]nam Snow. Limite las grasas no saludables (saturadas), daljit [place], aceite de cueva y aceite de [place]. Y limite las grasas de origen animal, daljit carne y productos lácteos elaborados con Francis sweeney. Trate de comer kelsie  solo unas pocas veces al mes en cantidades muy pequeñas. Limite los dulces y postres a solo un par de veces a la semana. [place] incluye bebidas Skipo. La dieta mediterránea también puede incluir vino tinto con las comidas: 1 vaso cada día para las mujeres y WATZING 2 vasos al día para los hombres. Consejos al comer en casa  Utilice hierbas, especias, ajo, corteza de massiel y [place] de cítricos en lugar de sal para jono sabor a los alimentos. Añada rodajas de aguacate a delacruz sándwich en lugar de tocino. Consuma pescado en el almuerzo o la fernando en lugar de carne Annikae Coupe.  Frote el pescado con aceite de partida y cocínelo al horno o a la jessica. [place] la ensalada con semillas o franco secos en lugar de ques. Cocine con aceite de partida o de canola en lugar de mantequilla o aceites con alto contenido de grasas saturadas. Cambie de [place] al 2% o entera a leche al 1% o descremada. Unte las verduras crudas en un aderezo de vinagreta o puré de garbanzos en lugar de salsas hechas de mayonesa o crema agria. Disfrute un pedazo de fruta para el postre en lugar de un pedazo de torta. Pruebe manzanas al horno, o coma algunas frutas secas. Consejos al comer afuera  Pruebe pescado cocido, asado a la jessica u horneado, en lugar de comerlo frito o empanado. Pídale al [facility]) que le preparen la comida con aceite de partida en lugar de [place]. Pida platos hechos con salsa marinera o salsas hechas con aceite de partida. Evite las salsas hechas con Chong Stephaniesher. Elija panes integrales, pasta y masa de pizza hechas de meliza integral, arroz integral, frijoles y lentejas. Reduzca el consumo de mantequilla o margarina en el pan. En delacruz lugar, puede untar el pan en kelby pequeña cantidad de aceite de partida. [place] acompañamiento, pida kelby ensalada pequeña o verduras asadas en lugar de liz fritas. ¿Dónde puede encontrar más información en \[facility] [place] [facility]\""? Junie Chafe a [email or web address] e ingrese a delacruz cuenta de MyChart. Miguel Nuno M718 en el [employer] \"Search Health Information\" para más información (en inglés) sobre \"Aprenda acerca de la dieta mediterránea. \"     Si no tiene kelby cuenta, brittney clautumn en el enlace \"Sign Up Now\". Revisado: 5 re[place], 46               Versión del contenido: 13.4  © 2006-2022 Healthwise, [employer]. Las instrucciones de cuidado fueron adaptadas bajo licencia por [facility] ([facility]). Si usted tiene Briscoe Clayton afección médica o sobre estas instrucciones, siempre pregunte a delacruz profesional de poncho. [employer], [employer] niega toda garantía o responsabilidad por delacruz uso de esta información.        Patient Education        Aprenda acerca de la dieta mediterránea  Learning About the [address] [facility] Diet  ¿Qué es la dieta mediterránea? La dieta mediterránea es un estilo de alimentación, en lugar de un plan de dieta. Consiste en alimentos consumidos en Alveda S[place], el rukhsana de [place] y [place], y otros países a lo kwadwo del [facility]. Resalta comer alimentos daljit pescado, frutas, verduras, frijoles (habichuelas), panes ricos en fibra y granos integrales, franco secos y aceite de [place]. Pratima estilo de alimentación incluye comer cantidades limitadas de wily Mendoza y marya. ¿Por qué elegir la dieta mediterránea? Pablito Holter de estilo mediterráneo puede mejorar la poncho del corazón. Contiene más grasa que otras dietas saludables para el corazón. Jone las grasas provienen principalmente de franco secos, aceites no saturados (daljit los aceites del pescado y el aceite de [place]) y ciertos aceites de franco secos o semillas (tales daljit el aceite de canola, soya o Kang Nathen). Estas grasas pueden ayudar a proteger Cori Buddle y los vasos sanguíneos. ¿Cómo puede usted comenzar kelby dieta mediterránea? Estas son algunas cosas que usted puede hacer para cambiar a un estilo de alimentación más similar a la dieta mediterránea. Qué comer  Coma kelby variedad de frutas y verduras cada día, tales daljit uvas, arándanos, tomates, brócoli, pimientos, higos, aceitunas, espinacas, berenjenas, frijoles, lentejas y garbanzos. Coma kelby variedad de alimentos de grano integral cada día, tales daljit breanna, arroz integral y pan, pasta y cuscús integrales. Coma pescado al menos 2 veces a la semana. Pruebe el atún, el salmón, la caballa, la [place], el arenque o las ronda. Consuma cantidades moderadas de productos lácteos bajos en grasa, daljit Evelin Basil o yogur. Consuma cantidades moderadas de aves y [place].   Elija grasas saludables (insaturadas), daljit franco secos, aceite de partida y ciertos aceites de franco secos o semillas, daljit el de canola (colza), soya y Kristin Wilkerson. Limite las grasas no saludables (saturadas), daljit [place], aceite de cueva y aceite de [place]. Y limite las grasas de origen animal, daljit carne y productos lácteos elaborados con Bobo Harrington entera. Trate de comer kelsie  solo unas pocas veces al mes en cantidades muy pequeñas. Limite los dulces y postres a solo un par de veces a la semana. [place] incluye bebidas FanHero. La dieta mediterránea también puede incluir vino tinto con las comidas: 1 vaso cada día para las mujeres y WATZING 2 vasos al día para los hombres. Consejos al comer en casa  Utilice hierbas, especias, ajo, corteza de massiel y [place] de cítricos en lugar de sal para jono sabor a los alimentos. Añada rodajas de aguacate a delacruz sándwich en lugar de tocino. Consuma pescado en el almuerzo o la fernando en lugar de wily Kat. Frote el pescado con aceite de partida y cocínelo al horno o a la jessica. Espolvoree la ensalada con semillas o franco secos en lugar de queso. Cocine con aceite de partida o de canola en lugar de mantequilla o aceites con alto contenido de grasas saturadas. Cambie de Bobo Harrington al 2% o entera a leche al 1% o descremada. Unte las verduras crudas en un aderezo de vinagreta o puré de garbanzos en lugar de salsas hechas de mayonesa o crema agria. Disfrute un pedazo de fruta para el postre en lugar de un pedazo de torta. Pruebe manzanas al horno, o coma algunas frutas secas. Consejos al comer afuera  Pruebe pescado cocido, asado a la jessica u horneado, en lugar de comerlo frito o empanado. Pídale al vianey ([facility]) que le preparen la comida con aceite de partida en lugar de [place]. Pida platos hechos con salsa marinera o salsas hechas con aceite de partida. Evite las salsas hechas con Saige Lunch. Elija panes integrales, pasta y masa de pizza hechas de meliza integral, arroz integral, frijoles y lentejas. Reduzca el consumo de mantequilla o margarina en el pan.  En delacruz lugar, puede untar el pan en kelby pequeña cantidad de aceite de [place]. [place] acompañamiento, pida kelby ensalada pequeña o verduras asadas en lugar de liz fritas. ¿Dónde puede encontrar más información en inglés? Laurie Rosita a [email or web address] e ingrese a delacruz cuenta de MyChart. Rajani Dace A155 en el Mike Floras \"Search Health Information\" para más información (en inglés) sobre \"Aprenda acerca de la dieta mediterránea. \"     Si no tiene kelby cuenta, brittney frank en el enlace \"Sign Up Now\". Revisado: 5 [place], 46               Versión del contenido: 13.4  © 3158-6207 Healthwise, Incorporated. Las instrucciones de cuidado fueron adaptadas bajo licencia por [facility] ([facility]). Si usted tiene [place] afección médica o sobre estas instrucciones, siempre pregunte a delacruz profesional de poncho. Healthwise, Incorporated niega toda garantía o responsabilidad por delacruz uso de esta información. Patient Education        Melissa Rapp de la dieta mediterránea  Learning About the [address] Diet  ¿Qué es la dieta mediterránea? La dieta mediterránea es un estilo de alimentación, en lugar de un plan de dieta. Consiste en alimentos consumidos en Jule Manger, el rukhsana de [place] y [facility], y otros países a lo kwadow del [facility]. Resalta comer alimentos daljit pescado, frutas, verduras, frijoles (habichuelas), panes ricos en fibra y granos integrales, franco secos y aceite de [place]. Pratima estilo de alimentación incluye comer cantidades limitadas de carne Elsa Lien y dulces. ¿Por qué elegir la dieta mediterránea? Rexine Keto de estilo mediterráneo puede mejorar la poncho del corazón. Contiene más grasa que otras dietas saludables para el corazón. Jone las grasas provienen principalmente de franco secos, aceites no saturados (daljit los aceites del pescado y el aceite de [place]) y ciertos aceites de franco secos o semillas (tales daljit el aceite de canola, soya o Florie Brew).  Estas grasas pueden ayudar a proteger el corazón y los vasos sanguíneos. ¿Cómo puede usted comenzar kelby dieta mediterránea? Estas son algunas cosas que usted puede hacer para cambiar a un estilo de alimentación más similar a la dieta mediterránea. Qué comer  Coma kelby variedad de frutas y verduras cada día, tales daljit uvas, arándanos, tomates, brócoli, pimientos, higos, aceitunas, espinacas, berenjenas, frijoles, lentejas y garbanzos. Coma kelby variedad de alimentos de grano integral cada día, tales daljit breanna, arroz integral y pan, pasta y cuscús integrales. Coma pescado al menos 2 veces a la semana. Pruebe el atún, el salmón, la caballa, la [place], el arenque o las ronda. Consuma cantidades moderadas de productos lácteos bajos en grasa, daljit Summer Bad o yogur. Consuma cantidades moderadas de aves y [place]. Elija grasas saludables (insaturadas), daljit franco secos, aceite de partida y ciertos aceites de franco secos o semillas, daljit el de canola (colza), soya y [employer] Gunn. Limite las grasas no saludables (saturadas), daljit [place], aceite de cueva y aceite de [place]. Y limite las grasas de origen animal, daljit carne y productos lácteos elaborados con Authur Common entera. Trate de comer kelsie  solo unas pocas veces al mes en cantidades muy pequeñas. Limite los dulces y postres a solo un par de veces a la semana. [place] incluye bebidas [employer]. La dieta mediterránea también puede incluir vino tinto con las comidas: 1 vaso cada día para las mujeres y WATZING 2 vasos al día para los hombres. Consejos al comer en casa  Utilice hierbas, especias, ajo, corteza de massiel y [place] de cítricos en lugar de sal para jono sabor a los alimentos. Añada rodajas de aguacate a delacruz sándwich en lugar de tocino. Consuma pescado en el almuerzo o la fernando en lugar de carne Staci Erin. Frote el pescado con aceite de partida y cocínelo al horno o a la jessica. Espolvoree la ensalada con semillas o franco secos en lugar de queso.   Cocine con aceite de partida o de canola en lugar de mantequilla o aceites con alto contenido de grasas saturadas. Cambie de [place] al 2% o entera a leche al 1% o descremada. Unte las verduras crudas en un aderezo de vinagreta o puré de garbanzos en lugar de salsas hechas de mayonesa o crema agria. Disfrute un pedazo de fruta para el postre en lugar de un pedazo de torta. Pruebe manzanas al horno, o coma algunas frutas secas. Consejos al comer afuera  Pruebe pescado cocido, asado a la jessica u horneado, en lugar de comerlo frito o empanado. Pídale al camarero ([facility]) que le preparen la comida con aceite de partida en lugar de [place]. Pida platos hechos con salsa marinera o salsas hechas con aceite de partida. Evite las salsas hechas con Carry Lower. Elija panes integrales, pasta y masa de pizza hechas de meliza integral, arroz integral, frijoles y lentejas. Reduzca el consumo de mantequilla o margarina en el pan. En delacruz lugar, puede untar el pan en kelby pequeña cantidad de aceite de partida. Adam acompañamiento, pida kelby ensalada pequeña o verduras asadas en lugar de liz fritas. ¿Dónde puede encontrar más información en inglés? Lucia Ink a [email or web address] e ingrese a delacruz cuenta de Ino. Dayanara Thompson X436 en el Yuli Marts \"Search Health Information\" para más información (en inglés) sobre \"Aprenda acerca de la dieta mediterránea. \"     Si no tiene kelby cuenta, brittney marie en el enlace \"Sign Up Now\". Revisado: 5 [place], 46               Versión del contenido: 13.4  © 3543-1952 Healthwise, Incorporated. Las instrucciones de cuidado fueron adaptadas bajo licencia por [facility] ([facility]). Si usted tiene [place] afección médica o sobre estas instrucciones, siempre pregunte a delacruz profesional de poncho. Healthwise, Incorporated niega toda garantía o responsabilidad por delacruz uso de esta información.        Patient Education        Yvonne Mills de la dieta mediterránea  Learning About the [address] Diet  ¿Qué es Caretha Ing mediterránea? La dieta mediterránea es un estilo de alimentación, en lugar de un plan de dieta. Consiste en alimentos consumidos en Aileen Pandbarrera, el rukhsana de [place] y [place], y otros países a lo kwadwo del [facility]. Resalta comer alimentos daljit pescado, frutas, verduras, frijoles (habichuelas), panes ricos en fibra y granos integrales, franco secos y aceite de [place]. Pratima estilo de alimentación incluye comer cantidades limitadas de carne [place] y dulces. ¿Por qué elegir la dieta mediterránea? Kathy Luis de estilo mediterráneo puede mejorar la poncho del corazón. Contiene más grasa que otras dietas saludables para el corazón. Jone las grasas provienen principalmente de franco secos, aceites no saturados (daljit los aceites del pescado y el aceite de [place]) y ciertos aceites de franco secos o semillas (tales daljit el aceite de canola, soya o Midnight Gunn). Estas grasas pueden ayudar a proteger Joanell Harada y los vasos sanguíneos. ¿Cómo puede usted comenzar kelby dieta mediterránea? Estas son algunas cosas que usted puede hacer para cambiar a un estilo de alimentación más similar a la dieta mediterránea. Qué comer  Coma kelby variedad de frutas y verduras cada día, tales daljit uvas, arándanos, tomates, brócoli, pimientos, higos, aceitunas, espinacas, berenjenas, frijoles, lentejas y garbanzos. Coma kelby variedad de alimentos de grano integral cada día, tales daljit breanna, arroz integral y pan, pasta y cuscús integrales. Coma pescado al menos 2 veces a la semana. Pruebe el atún, el salmón, la caballa, la [place], el arenque o las ronda. Consuma cantidades moderadas de productos lácteos bajos en grasa, daljit Summer Bad o yogur. Consuma cantidades moderadas de aves y [place]. Elija grasas saludables (insaturadas), daljit franco secos, aceite de partida y ciertos aceites de franco secos o semillas, daljit el de canola (colza), soya y Dhiraj Gunn.   Limite las grasas no saludables (saturadas), daljit [place], aceite de cueva y aceite de [place]. Y limite las grasas de origen animal, adam carne y productos lácteos elaborados con Nael Velazco entera. Trate de comer kelsie  solo unas pocas veces al mes en cantidades muy pequeñas. Limite los dulces y postres a solo un par de veces a la semana. [place] incluye bebidas [employer]. La dieta mediterránea también puede incluir vino tinto con las comidas: 1 vaso cada día para las mujeres y WATZING 2 vasos al día para los hombres. Consejos al comer en casa  Utilice hierbas, especias, ajo, corteza de massiel y [place] de cítricos en lugar de sal para jono sabor a los alimentos. Añada rodajas de aguacate a delacruz sándwich en lugar de tocino. Consuma pescado en el almuerzo o la fernando en lugar de carne Hiren Grates. Frote el pescado con aceite de partida y cocínelo al horno o a la jessica. Espolvoree la ensalada con semillas o franco secos en lugar de queso. Cocine con aceite de partida o de canola en lugar de mantequilla o aceites con alto contenido de grasas saturadas. Cambie de Nael Velazco al 2% o entera a leche al 1% o descremada. Unte las verduras crudas en un aderezo de vinagreta o puré de garbanzos en lugar de salsas hechas de mayonesa o crema agria. Disfrute un pedazo de fruta para el postre en lugar de un pedazo de torta. Pruebe manzanas al horno, o coma algunas frutas secas. Consejos al comer afuera  Pruebe pescado cocido, asado a la jessica u horneado, en lugar de comerlo frito o empanado. Pídale al camarero (mesero) que le preparen la comida con aceite de partida en [facility] de [place]. Pida platos hechos con salsa marinera o salsas hechas con aceite de partida. Evite las salsas hechas con Lestine [place]. Elija panes integrales, pasta y masa de pizza hechas de meliza integral, arroz integral, frijoles y lentejas. Reduzca el consumo de mantequilla o margarina en el pan. En delacruz lugar, puede untar el pan en kelby pequeña cantidad de aceite de partida.   Adam acompañamiento, pida kelby ensalada pequeña o verduras asadas en lugar de liz fritas. ¿Dónde puede encontrar más información en inglés? Elizabeth Musselshell a [email or web address] e ingrese a delacruz cuenta de Ino. Esteban Jules [id number] en el Diannah Climes \"Search Health Information\" para más información (en inglés) sobre \"Aprenda acerca de la dieta mediterránea. \"     Si no tiene kelby cuenta, brittney frank en el enlace \"Sign Up Now\". Revisado: 5 [place], 46               Versión del contenido: 13.4  © 7161-8551 Healthwise, Incorporated. Las instrucciones de cuidado fueron adaptadas bajo licencia por [place]. Si usted tiene [place] afección médica o sobre estas instrucciones, siempre pregunte a delacruz profesional de poncho. Healthwise, Incorporated niega toda garantía o responsabilidad por delacruz uso de esta información. Patient Education        Fabrizio Like de la dieta mediterránea  Learning About the [address] Diet  ¿Qué es la dieta mediterránea? La dieta mediterránea es un estilo de alimentación, en lugar de un plan de dieta. Consiste en alimentos consumidos en [facility], el rukhsana de [place] y [facility], y otros países a lo kwadwo del [facility]. Resalta comer alimentos daljit pescado, frutas, verduras, frijoles (habichuelas), panes ricos en fibra y granos integrales, franco secos y aceite de [place]. Partima estilo de alimentación incluye comer cantidades limitadas de carne Arleth Cam y dulces. ¿Por qué elegir la dieta mediterránea? St. Francis Musca de estilo mediterráneo puede mejorar la poncho del corazón. Contiene más grasa que otras dietas saludables para el corazón. Jone las grasas provienen principalmente de franco secos, aceites no saturados (daljit los aceites del pescado y el aceite de [place]) y ciertos aceites de franco secos o semillas (tales daljit el aceite de canola, soya o Jonita Reining). Estas grasas pueden ayudar a proteger Newt Hertz y los vasos sanguíneos. ¿Cómo puede usted comenzar kelby dieta mediterránea?   Estas son algunas cosas que usted puede hacer para cambiar a un estilo de alimentación más similar a la dieta mediterránea. Qué comer  Coma kelby variedad de frutas y verduras cada día, tales daljit uvas, arándanos, tomates, brócoli, pimientos, higos, aceitunas, espinacas, berenjenas, frijoles, lentejas y garbanzos. Coma kelby variedad de alimentos de grano integral cada día, tales daljit breanna, arroz integral y pan, pasta y cuscús integrales. Coma pescado al menos 2 veces a la semana. Pruebe el atún, el salmón, la caballa, la [place], el arenque o las ronda. Consuma cantidades moderadas de productos lácteos bajos en grasa, daljit Fay Matar o yogur. Consuma cantidades moderadas de aves y [place]. Elija grasas saludables (insaturadas), daljit franco secos, aceite de partida y ciertos aceites de franco secos o semillas, daljit el de canola (colza), soya y Alvia Dies. Limite las grasas no saludables (saturadas), daljit [place], aceite de cueva y aceite de [place]. Y limite las grasas de origen animal, daljit carne y productos lácteos elaborados con [place] entera. Trate de comer kelsie  solo unas pocas veces al mes en cantidades muy pequeñas. Limite los dulces y postres a solo un par de veces a la semana. [place] incluye bebidas [employer]. La dieta mediterránea también puede incluir vino tinto con las comidas: 1 vaso cada día para las mujeres y WATZING 2 vasos al día para los hombres. Consejos al comer en casa  Utilice hierbas, especias, ajo, corteza de massiel y [place] de cítricos en lugar de sal para jono sabor a los alimentos. Añada rodajas de aguacate a delacruz sándwich en lugar de tocino. Consuma pescado en el almuerzo o la fernando en lugar de carne Josie Simone. Frote el pescado con aceite de partida y cocínelo al horno o a la jessica. Espolvoree la ensalada con semillas o franco secos en lugar de queso. Cocine con aceite de partida o de canola en lugar de mantequilla o aceites con alto contenido de grasas saturadas.   Cambie de Salbador Moise al 2% o entera a leche al 1% o descremada. Unte las verduras crudas en un aderezo de vinagreta o puré de garbanzos en lugar de salsas hechas de mayonesa o crema agria. Disfrute un pedazo de fruta para el postre en lugar de un pedazo de torta. Pruebe manzanas al horno, o coma algunas frutas secas. Consejos al comer afuera  Pruebe pescado cocido, asado a la jessica u horneado, en lugar de comerlo frito o empanado. Pídale al camarero ([facility]) que le preparen la comida con aceite de partida en lugar de [place]. Pida platos hechos con salsa marinera o salsas hechas con aceite de partida. Evite las salsas hechas con Kandice Hayden. Elija panes integrales, pasta y masa de pizza hechas de meliza integral, arroz integral, frijoles y lentejas. Reduzca el consumo de mantequilla o margarina en el pan. En delacruz lugar, puede untar el pan en kelby pequeña cantidad de aceite de partida. Adam acompañamiento, pida kelby ensalada pequeña o verduras asadas en lugar de liz fritas. ¿Dónde puede encontrar más información en [facility]? Steve Patterson a [email or web address] e ingrese a delacruz cuenta de MyChart. Lorenzo Dozier K886 en el Antonieta Gor \"Search Health Information\" para más información (en inglés) sobre \"Aprenda acerca de la dieta mediterránea. \"     Si no tiene kelby cuenta, brittney frank en el enlace \"Sign Up Now\". Revisado: 5 [place], 46               Versión del contenido: 13.4  © 7570-1735 [employer], Incorporated. Las instrucciones de cuidado fueron adaptadas bajo licencia por [facility] ([facility]). Si usted tiene [place] afección médica o sobre estas instrucciones, siempre pregunte a delacruz profesional de poncho. [facility] Incorporated niega toda garantía o responsabilidad por delacruz uso de esta información. Patient Education        Godfrey Alston de la dieta mediterránea  Learning About the [address] Diet  ¿Qué es la dieta mediterránea? La dieta mediterránea es un estilo de alimentación, en lugar de un plan de dieta.  Consiste en alimentos consumidos en Laura Ames, el rukhsana de [place] y [facility] [place], y otros países a lo kwadwo del [facility]. Resalta comer alimentos daljit pescado, frutas, verduras, frijoles (habichuelas), panes ricos en fibra y granos integrales, franco secos y aceite de [place]. Pratima estilo de alimentación incluye comer cantidades limitadas de carne Elsa Lien y dulces. ¿Por qué elegir la dieta mediterránea? Rexine Keto de estilo mediterráneo puede mejorar la poncho del corazón. Contiene más grasa que otras dietas saludables para el corazón. Jone las grasas provienen principalmente de franco secos, aceites no saturados (daljit los aceites del pescado y el aceite de [place]) y ciertos aceites de franco secos o semillas (tales daljit el aceite de canola, soya o Florie Brew). Estas grasas pueden ayudar a proteger [place] y los vasos sanguíneos. ¿Cómo puede usted comenzar kelby dieta mediterránea? Estas son algunas cosas que usted puede hacer para cambiar a un estilo de alimentación más similar a la dieta mediterránea. Qué comer  Coma kelby variedad de frutas y verduras cada día, tales daljit uvas, arándanos, tomates, brócoli, pimientos, higos, aceitunas, espinacas, berenjenas, frijoles, lentejas y garbanzos. Coma kelby variedad de alimentos de grano integral cada día, tales daljit breanna, arroz integral y pan, pasta y cuscús integrales. Coma pescado al menos 2 veces a la semana. Pruebe el atún, el salmón, la caballa, la [place], el arenque o las ronda. Consuma cantidades moderadas de productos lácteos bajos en grasa, daljit [place] Valentin o yogur. Consuma cantidades moderadas de aves y [place]. Elija grasas saludables (insaturadas), daljit franco secos, aceite de partida y ciertos aceites de franco secos o semillas, daljit el de canola (colza), soya y Florie Brew.   Limite las grasas no saludables (saturadas), daljit mantequilla, aceite de cueva y aceite de [place]. Y limite las grasas de origen animal, daljit carne y productos Jacquelynne Gun con Gabe Pablo. Trate de comer kelsie  solo unas pocas veces al mes en cantidades muy pequeñas. Limite los dulces y postres a solo un par de veces a la semana. [place] incluye bebidas [place]. La dieta mediterránea también puede incluir vino tinto con las comidas: 1 vaso cada día para las mujeres y WATZING 2 vasos al día para los hombres. Consejos al comer en casa  Utilice hierbas, especias, ajo, corteza de massiel y [place] de cítricos en lugar de sal para jono sabor a los alimentos. Añada rodajas de aguacate a delacruz sándwich en lugar de tocino. Consuma pescado en el almuerzo o la fernando en lugar de carne Rip Lunger. Frote el pescado con aceite de partida y cocínelo al horno o a la jessica. Esp[place]voree la ensalada con semillas o franco secos en lugar de [facility]. Cocine con aceite de partida o de canola en lugar de mantequilla o aceites con alto contenido de grasas saturadas. Cambie de [place] al 2% o entera a leche al 1% o descremada. Unte las verduras crudas en un aderezo de vinagreta o puré de garbanzos en lugar de salsas hechas de mayonesa o crema agria. Disfrute un pedazo de fruta para el postre en lugar de un pedazo de torta. Pruebe manzanas al horno, o coma algunas frutas secas. Consejos al comer afuera  Pruebe pescado cocido, asado a la jessica u horneado, en lugar de comerlo frito o empanado. Pídale al camarero (mesero) que le preparen la comida con aceite de partida en gar de [place]. Pida platos hechos con salsa marinera o salsas hechas con aceite de partida. Evite las salsas hechas con [place]. Elija panes integrales, pasta y masa de pizza hechas de meliza integral, arroz integral, frijoles y lentejas. Reduzca el consumo de mantequilla o margarina en el pan. En delacruz lugar, puede untar el pan en kelby pequeña cantidad de aceite de partida. [place] acompañamiento, pida kelby ensalada pequeña o verduras asadas en lugar de liz fritas. ¿Dónde puede encontrar más información en inglés?   Jazlyn Mcginnis a [email or web address] e ingrese a delacruz cuenta de MyChart. Sethwilber Tomy U778 en el Kasia Berry \"Search Health Information\" para más información (en inglés) sobre \"Aprenda acerca de la dieta mediterránea. \"     Si no tiene kelby cuenta, brittney frank en el enlace \"Sign Up Now\". Revisado: 5 [place], 46               Versión del contenido: 13.4  © 9523-3479 Healthwise, Incorporated. Las instrucciones de cuidado fueron adaptadas bajo licencia por [facility] Si usted tiene [place] afección médica o sobre estas instrucciones, siempre pregunte a delacruz profesional de poncho. Healthwise, Incorporated niega toda garantía o responsabilidad por delacruz uso de esta información. Patient Education        Kenia Carpenter de la dieta mediterránea  Learning About the [address] Diet  ¿Qué es la dieta mediterránea? La dieta mediterránea es un estilo de alimentación, en lugar de un plan de dieta. Consiste en alimentos consumidos en [facility], el [facility] y [facility], y otros países a lo kwadwo del [facility]. Resalta comer alimentos daljit pescado, frutas, verduras, frijoles (habichuelas), panes ricos en fibra y granos integrales, franco secos y aceite de [place]. Pratima estilo de alimentación incluye comer cantidades limitadas de carne Randye Camilo y dulces. ¿Por qué elegir la dieta mediterránea? Camila Hammans de estilo mediterráneo puede mejorar la poncho del corazón. Contiene más grasa que otras dietas saludables para el corazón. Jone las grasas provienen principalmente de franco secos, aceites no saturados (daljit los aceites del pescado y el aceite de [place]) y ciertos aceites de franco secos o semillas (tales daljit el aceite de canola, soya o [place]). Estas grasas pueden ayudar a proteger Donney Grew y los vasos sanguíneos. ¿Cómo puede usted comenzar kelby dieta mediterránea? Estas son algunas cosas que usted puede hacer para cambiar a un estilo de alimentación más similar a la dieta mediterránea.   [address]  Coma kelby variedad de frutas y verduras cada día, tales daljit uvas, arándanos, tomates, brócoli, pimientos, higos, aceitunas, espinacas, berenjenas, frijoles, lentejas y garbanzos. Coma kelby variedad de alimentos de grano integral cada día, tales daljit breanna, arroz integral y pan, pasta y cuscús integrales. Coma pescado al menos 2 veces a la semana. Pruebe el atún, el salmón, la caballa, la [place], el arenque o las ronda. Consuma cantidades moderadas de productos lácteos bajos en grasa, daljit Jacqueline Flatter o yogur. Consuma cantidades moderadas de aves y [place]. Elija grasas saludables (insaturadas), daljit franco secos, aceite de partida y ciertos aceites de franco secos o semillas, daljit el de canola (colza), soya y Giovanny Latoya. Limite las grasas no saludables (saturadas), daljit [place], aceite de cueva y aceite de [place]. Y limite las grasas de origen animal, daljit carne y productos lácteos elaborados con Javed Sprawls entera. Trate de comer kelsie  solo unas pocas veces al mes en cantidades muy pequeñas. Limite los dulces y postres a solo un par de veces a la semana. [place] incluye bebidas [place]. La dieta mediterránea también puede incluir vino tinto con las comidas: 1 vaso cada día para las mujeres y WATZING 2 vasos al día para los hombres. Consejos al comer en casa  Utilice hierbas, especias, ajo, corteza de massiel y [place] de cítricos en lugar de sal para jono sabor a los alimentos. Añada rodajas de aguacate a delacruz sándwich en lugar de tocino. Consuma pescado en el almuerzo o la fernando en lugar de wily Jarquin. Frote el pescado con aceite de partida y cocínelo al horno o a la jessica. Espolvoree la ensalada con semillas o franco secos en lugar de queso. Cocine con aceite de partida o de canola en lugar de mantequilla o aceites con alto contenido de grasas saturadas. Cambie de Javed Sprawls al 2% o entera a leche al 1% o descremada.   Unte las verduras crudas en un aderezo de vinagreta o puré de garbanzos en lugar de salsas hechas de mayonesa o crema agria. Disfrute un pedazo de fruta para el postre en lugar de un pedazo de torta. Pruebe manzanas al horno, o coma algunas frutas secas. Consejos al comer afuera  Pruebe pescado cocido, asado a la jessica u horneado, en lugar de comerlo frito o empanado. Pídale al camarero (mesero) que le preparen la comida con aceite de partida en lugar de [place]. Pida platos hechos con salsa marinera o salsas hechas con aceite de partida. Evite las salsas hechas con Jerlean Prader. Elija panes integrales, pasta y masa de pizza hechas de meliza integral, arroz integral, frijoles y lentejas. Reduzca el consumo de mantequilla o margarina en el pan. En delacruz lugar, puede untar el pan en kelby pequeña cantidad de aceite de partida. [place] acompañamiento, pida kelby ensalada pequeña o verduras asadas en lugar de liz fritas. ¿Dónde puede encontrar más información en hospitals? Foster Aye a [email or web address] e ingrese a delacruz cuenta de MyChart. Dennie Deana P360 en el [facility] \"Search Health Information\" para más información (en inglés) sobre \"Aprenda acerca de la dieta mediterránea. \"     Si no tiene kelby cuenta, brittney frank en el enlace \"Sign Up Now\". Revisado: 5 [place], 46               Versión del contenido: 13.4  © 5201-5069 HealthRoss, Incorporated. Las instrucciones de cuidado fueron adaptadas bajo licencia por [facility] CARE ([facility]). Si usted tiene [place] afección médica o sobre estas instrucciones, siempre pregunte a delacruz profesional de poncho. [facility], Incorporated niega toda garantía o responsabilidad por delacruz uso de esta información. Patient Education        Alexis Jackson de la dieta mediterránea  Learning About the [address] Diet  ¿Qué es la dieta mediterránea? La dieta mediterránea es un estilo de alimentación, en lugar de un plan de dieta.  Consiste en alimentos consumidos en [facility], el [facility] y [facility], y otros países a lo kwadwo del mar Mediterráneo. Resalta comer alimentos daljit pescado, frutas, verduras, frijoles (habichuelas), panes ricos en fibra y granos integrales, franco secos y aceite de [place]. Pratima estilo de alimentación incluye comer cantidades limitadas de carne Shiraz Bhumi y dulces. ¿Por qué elegir la dieta mediterránea? Alcario Crosser de estilo mediterráneo puede mejorar la poncho del corazón. Contiene más grasa que otras dietas saludables para el corazón. Jone las grasas provienen principalmente de franco secos, aceites no saturados (daljit los aceites del pescado y el aceite de [place]) y ciertos aceites de franco secos o semillas (tales daljit el aceite de canola, soya o Precilla Blower). Estas grasas pueden ayudar a proteger Ramonia Cola y los vasos sanguíneos. ¿Cómo puede usted comenzar kelby dieta mediterránea? Estas son algunas cosas que usted puede hacer para cambiar a un estilo de alimentación más similar a la dieta mediterránea. Qué comer  Coma kelby variedad de frutas y verduras cada día, tales daljit uvas, arándanos, tomates, brócoli, pimientos, higos, aceitunas, espinacas, berenjenas, frijoles, lentejas y garbanzos. Coma kelby variedad de alimentos de grano integral cada día, tales daljit breanna, arroz integral y pan, pasta y cuscús integrales. Coma pescado al menos 2 veces a la semana. Pruebe el atún, el salmón, la caballa, la [place], el arenque o las ronda. Consuma cantidades moderadas de productos lácteos bajos en grasa, daljit Barker Polo o yogur. Consuma cantidades moderadas de aves y [place]. Elija grasas saludables (insaturadas), daljit franco secos, aceite de partida y ciertos aceites de franco secos o semillas, daljit el de canola (colza), soya y Precilla Blower. Limite las grasas no saludables (saturadas), daljit [place], aceite de cueva y aceite de [place]. Y limite las grasas de origen animal, daljit carne y productos lácteos elaborados con Marylee Bailey entera.  Trate de comer kelsie  solo unas pocas veces al mes en cantidades [place] pequeñas. Limite los dulces y postres a solo un par de veces a la semana. [place] incluye bebidas [employer]s. La dieta mediterránea también puede incluir vino tinto con las comidas: 1 vaso cada día para las mujeres y WATZING 2 vasos al día para los hombres. Consejos al comer en casa  Utilice hierbas, especias, ajo, corteza de massiel y [place] de cítricos en lugar de sal para jono sabor a los alimentos. Añada rodajas de aguacate a delacruz sándwich en lugar de [facility]. Consuma pescado en el almuerzo o la fernando en lugar de [facility]. Frote el pescado con aceite de partida y cocínelo al horno o a la jessica. Espolvoree la ensalada con semillas o franco secos en lugar de [facility]. Cocine con aceite de partida o de canola en lugar de mantequilla o aceites con alto contenido de grasas saturadas. Cambie de [place] al 2% o entera a leche al 1% o descremada. Unte las verduras crudas en un aderezo de vinagreta o puré de garbanzos en lugar de salsas hechas de mayonesa o crema agria. Disfrute un pedazo de fruta para el postre en lugar de un pedazo de torta. Pruebe manzanas al horno, o coma algunas frutas secas. Consejos al comer afuera  Pruebe pescado cocido, asado a la jessica u horneado, en lugar de comerlo frito o empanado. Pídale al vianey ([facility]) que le preparen la comida con aceite de partida en lugar de [place]. Pida platos hechos con salsa marinera o salsas hechas con aceite de partida. Evite las salsas hechas con Gearldean Douse. Elija panes integrales, pasta y masa de pizza hechas de meliza integral, arroz integral, frijoles y lentejas. Reduzca el consumo de mantequilla o margarina en el pan. En delacruz lugar, puede untar el pan en kelby pequeña cantidad de aceite de partida. Adam acompañamiento, pida kelby ensalada pequeña o verduras asadas en lugar de liz fritas. ¿Dónde puede encontrar más información en inglés? Aisha Raymond a [email or web address] e ingrese a delacruz cuenta de MyChart.  Escriba O407 en el Delcia Single \"Search Health Information\" para más información (en inglés) sobre \"Aprenda acerca de la dieta mediterránea. \"     Si no tiene kelby cuenta, brittney frank en el enlace \"Sign Up Now\". Revisado: 5 [place], 46               Versión del contenido: 13.4  © 9140-6449 Healthwise, Incorporated. Las instrucciones de cuidado fueron adaptadas bajo licencia por [facility] ([facility]). Si usted tiene [place] afección médica o sobre estas instrucciones, siempre pregunte a delacruz profesional de poncho. Healthwise, Incorporated niega toda garantía o responsabilidad por delacruz uso de esta información. Patient Education        Lovenia Givens de la dieta mediterránea  Learning About the [address] [facility]  ¿Qué es la dieta mediterránea? La dieta mediterránea es un estilo de alimentación, en lugar de un plan de dieta. Consiste en alimentos consumidos en [facility], el [facility] y [facility], y otros países a lo kwadwo del [facility]. Resalta comer alimentos daljit pescado, frutas, verduras, frijoles (habichuelas), panes ricos en fibra y granos integrales, franco secos y aceite de [place]. Pratima estilo de alimentación incluye comer cantidades limitadas de wily Vega y marya. ¿Por qué elegir la dieta mediterránea? Gabriele Sayra de estilo mediterráneo puede mejorar la poncho del corazón. Contiene más grasa que otras dietas saludables para el corazón. Jone las grasas provienen principalmente de franco secos, aceites no saturados (daljit los aceites del pescado y el aceite de [place]) y ciertos aceites de franco secos o semillas (tales daljit el aceite de canola, soya o Alfornia Zack). Estas grasas pueden ayudar a proteger Dey Judson y los vasos sanguíneos. ¿Cómo puede usted comenzar kelby dieta mediterránea? Estas son algunas cosas que usted puede hacer para cambiar a un estilo de alimentación más similar a la dieta mediterránea.   Qué comer  Coma kelby variedad de frutas y verduras cada día, tales daljit uvas, mason, criss, brócoli, pimientos, higos, aceitunas, espinacas, berenjenas, frijoles, lentejas y garbanzos. Coma kelby variedad de alimentos de grano integral cada día, tales daljit breanna, arroz integral y pan, pasta y cuscús integrales. Coma pescado al menos 2 veces a la semana. Pruebe el atún, el salmón, la caballa, la [place], el arenque o las ronda. Consuma cantidades moderadas de productos lácteos bajos en grasa, daljit George Gavel o yogur. Consuma cantidades moderadas de aves y [place]. Elija grasas saludables (insaturadas), daljit franco secos, aceite de partida y ciertos aceites de franco secos o semillas, daljit el de canola (colza), soya y Zenovia Bjornstad. Limite las grasas no saludables (saturadas), daljit [place], aceite de cueva y aceite de [place]. Y limite las grasas de origen animal, daljit carne y productos lácteos elaborados con Osmani sweeney. Trate de comer kelsie  solo unas pocas veces al mes en cantidades muy pequeñas. Limite los dulces y postres a solo un par de veces a la semana. [place] incluye bebidas Sarsys. La dieta mediterránea también puede incluir vino tinto con las comidas: 1 vaso cada día para las mujeres y WATZING 2 vasos al día para los hombres. Consejos al comer en casa  Utilice hierbas, especias, ajo, corteza de massiel y [place] de cítricos en lugar de sal para jono sabor a los alimentos. Añada rodajas de aguacate a delacruz sándwich en lugar de tocino. Consuma pescado en el almuerzo o la fernando en lugar de carne Raegan Roof. Frote el pescado con aceite de partida y cocínelo al horno o a la jessica. Espolvoree la ensalada con semillas o franco secos en lugar de queso. Cocine con aceite de partida o de canola en lugar de mantequilla o aceites con alto contenido de grasas saturadas. Cambie de Osman[place] al 2% o entera a leche al 1% o descremada. Unte las verduras crudas en un aderezo de vinagreta o puré de garbanzos en lugar de salsas hechas de mayonesa o crema agria.   Disfrute un pedazo de fruta para el postre en lugar de un pedazo de torta. Pruebe manzanas al horno, o coma algunas frutas secas. Consejos al comer afuera  Pruebe pescado cocido, asado a la jessica u horneado, en lugar de comerlo frito o empanado. Pídale al camarero (mesero) que le preparen la comida con aceite de partida en lugar de [place]. Pida platos hechos con salsa marinera o salsas hechas con aceite de partida. Evite las salsas hechas con Paticia Mccarthy. Elija panes integrales, pasta y masa de pizza hechas de meliza integral, arroz integral, frijoles y lentejas. Reduzca el consumo de mantequilla o margarina en el pan. En delacruz lugar, puede untar el pan en kelby pequeña cantidad de aceite de partida. [place] acompañamiento, pida kelby ensalada pequeña o verduras asadas en lugar de liz fritas. ¿Dónde puede encontrar más información en inglés? Aretha Net a [email or web address] e ingrese a delacruz cuenta de MyChart. Jonda Miahon H434 en el Almanzar Bimler \"Search Health Information\" para más información (en inglés) sobre \"Aprenda acerca de la dieta mediterránea. \"     Si no tiene kelby cuenta, brittney frank en el enlace \"Sign Up Now\". Revisado: 5 [place], 46               Versión del contenido: 13.4  © 9481-4293 Healthwise, Incorporated. Las instrucciones de cuidado fueron adaptadas bajo licencia por [facility]). Si usted tiene [place] afección médica o sobre estas instrucciones, siempre pregunte a delacruz profesional de poncho. Healthwise, Incorporated niega toda garantía o responsabilidad por delacruz uso de esta información. Patient Education        Darrol [place] de la dieta mediterránea  Learning About the [address] Diet  ¿Qué es la dieta mediterránea? La dieta mediterránea es un estilo de alimentación, en lugar de un plan de dieta. Consiste en alimentos consumidos en Didi Habermann, el rukhsana de [facility] y [place], y otros países a lo kwadwo del [facility].  Resalta comer alimentos daljit pescado, frutas, verduras, frijoles (habichuelas), panes ricos en fibra y granos integrales, franco secos y aceite de [place]. Pratima estilo de alimentación incluye comer cantidades limitadas de carne Judeen Reaper y dulces. ¿Por qué elegir la dieta mediterránea? Alpheus Agreste de estilo mediterráneo puede mejorar la poncho del corazón. Contiene más grasa que otras dietas saludables para el corazón. Jone las grasas provienen principalmente de franco secos, aceites no saturados (daljit los aceites del pescado y el aceite de [place]) y ciertos aceites de franco secos o semillas (tales daljit el aceite de canola, soya o Lorne Chyna). Estas grasas pueden ayudar a proteger Jettie Cater y los vasos sanguíneos. ¿Cómo puede usted comenzar kelby dieta mediterránea? Estas son algunas cosas que usted puede hacer para cambiar a un estilo de alimentación más similar a la dieta mediterránea. Qué comer  Coma kelby variedad de frutas y verduras cada día, tales daljit uvas, arándanos, tomates, brócoli, pimientos, higos, aceitunas, espinacas, berenjenas, frijoles, lentejas y garbanzos. Coma kelby variedad de alimentos de grano integral cada día, tales daljit breanna, arroz integral y pan, pasta y cuscús integrales. Coma pescado al menos 2 veces a la semana. Pruebe el atún, el salmón, la caballa, la [place], el arenque o las ronda. Consuma cantidades moderadas de productos lácteos bajos en grasa, daljit Partida Grief o yogur. Consuma cantidades moderadas de aves y [place]. Elija grasas saludables (insaturadas), daljit franco secos, aceite de partida y ciertos aceites de franco secos o semillas, daljit el de canola (colza), soya y Lorne Chyna. Limite las grasas no saludables (saturadas), daljit [place], aceite de cueva y aceite de [place]. Y limite las grasas de origen animal, daljit carne y productos lácteos elaborados con Derenda Sumerduck entera. Trate de comer kelsie  solo unas pocas veces al mes en cantidades muy pequeñas. Limite los dulces y postres a solo un par de veces a la semana.  [address] gaseosas. La dieta mediterránea también puede incluir vino tinto con las comidas: 1 vaso cada día para las mujeres y WATZING 2 vasos al día para los hombres. Consejos al comer en casa  Utilice hierbas, especias, ajo, corteza de massiel y [place] de cítricos en lugar de sal para jono sabor a los alimentos. Añada rodajas de aguacate a delacruz sándwich en lugar de tocino. Consuma pescado en el almuerzo o la fernando en lugar de [facility] Maritza Hoover[place]. Frote el pescado con aceite de partida y cocínelo al horno o a la jessica. Espolvoree la ensalada con semillas o franco secos en lugar de [facility]. Cocine con aceite de partida o de canola en lugar de mantequilla o aceites con alto contenido de grasas saturadas. Cambie de [place] al 2% o entera a leche al 1% o descremada. Unte las verduras crudas en un aderezo de vinagreta o puré de garbanzos en lugar de salsas hechas de mayonesa o crema agria. Disfrute un pedazo de fruta para el postre en lugar de un pedazo de torta. Pruebe manzanas al horno, o coma algunas frutas secas. Consejos al comer afuera  Pruebe pescado cocido, asado a la jessica u horneado, en lugar de comerlo frito o empanado. Pídale al [facility] que le preparen la comida con aceite de partida en gar de [place]. Pida platos hechos con salsa marinera o salsas hechas con aceite de partida. Evite las salsas hechas con Carry Lower. Elija panes integrales, pasta y masa de pizza hechas de meliza integral, arroz integral, frijoles y lentejas. Reduzca el consumo de mantequilla o margarina en el pan. En delacruz lugar, puede untar el pan en kelby pequeña cantidad de aceite de partida. Adam acompañamiento, pida kelby ensalada pequeña o verduras asadas en lugar de liz fritas. ¿Dónde puede encontrar más información en inglés? Lucia Ink a [email or web address] e ingrese a delacruz cuenta de MyChart.  Dayanara Thompson V219 en el Yuli Marts \"Search Health Information\" para más información (en inglés) sobre \"Aprenda acerca de la dieta mediterránea. \"     Si no tiene kelby cuenta, brittney frank en el enlace \"Sign Up Now\". Revisado: 5 [place], 46               Versión del contenido: 13.4  © 2006-2022 Healthwise, Incorporated. Las instrucciones de cuidado fueron adaptadas bajo licencia por [facility]. Si usted tiene [place] afección médica o sobre estas instrucciones, siempre pregunte a delacruz profesional de poncho. Healthwise, Incorporated niega toda garantía o responsabilidad por delacruz uso de esta información. Patient Education        Kenia Jayden de la dieta mediterránea  Learning About the [address] [facility]  ¿Qué es la dieta mediterránea? La dieta mediterránea es un estilo de alimentación, en lugar de un plan de dieta. Consiste en alimentos consumidos en [facility], el [facility] y [facility], y otros países a lo kwadwo del [facility]. Resalta comer alimentos daljit pescado, frutas, verduras, frijoles (habichuelas), panes ricos en fibra y granos integrales, franco secos y aceite de [place]. Pratima estilo de alimentación incluye comer cantidades limitadas de carne Randye Camilo y dulces. ¿Por qué elegir la dieta mediterránea? Camila Hammans de estilo mediterráneo puede mejorar la poncho del corazón. Contiene más grasa que otras dietas saludables para el corazón. Jone las grasas provienen principalmente de franco secos, aceites no saturados (daljit los aceites del pescado y el aceite de [place]) y ciertos aceites de franco secos o semillas (tales daljit el aceite de canola, soya o [place]). Estas grasas pueden ayudar a proteger Donney Grew y los vasos sanguíneos. ¿Cómo puede usted comenzar kelby dieta mediterránea? Estas son algunas cosas que usted puede hacer para cambiar a un estilo de alimentación más similar a la dieta mediterránea. Qué comer  Coma kelby variedad de frutas y verduras cada día, tales daljit uvas, arándanos, tomates, brócoli, pimientos, higos, aceitunas, espinacas, berenjenas, frijoles, lentejas y garbanzos.   Coma kelby variedad de alimentos de grano integral cada día, tales daljit breanna, arroz integral y pan, pasta y cuscús integrales. Coma pescado al menos 2 veces a la semana. Pruebe el atún, el salmón, la caballa, la [place], el arenque o las ronda. Consuma cantidades moderadas de productos lácteos bajos en grasa, daljit Jacqueline Flatter o yogur. Consuma cantidades moderadas de aves y [place]. Elija grasas saludables (insaturadas), daljit franco secos, aceite de partida y ciertos aceites de franco secos o semillas, daljit el de canola (colza), soya y Giovanny Latoya. Limite las grasas no saludables (saturadas), daljit [place], aceite de cueva y aceite de [place]. Y limite las grasas de origen animal, daljit carne y productos lácteos elaborados con Javed Sprawls entera. Trate de comer kelsie  solo unas pocas veces al mes en cantidades muy pequeñas. Limite los dulces y postres a solo un par de veces a la semana. [place] incluye bebidas [place]. La dieta mediterránea también puede incluir vino tinto con las comidas: 1 vaso cada día para las mujeres y [place]ATZ[place] 2 vasos al día para los hombres. Consejos al comer en casa  Utilice hierbas, especias, ajo, corteza de massiel y [place] de cítricos en lugar de sal para jono sabor a los alimentos. Añada rodajas de aguacate a delacruz sándwich en lugar de tocino. Consuma pescado en el almuerzo o la fernando en lugar de carne Cathleen Jarquin. Frote el pescado con aceite de partida y cocínelo al horno o a la jessica. [place] la ensalada con semillas o franco secos en lugar de queso. Cocine con aceite de partida o de canola en lugar de mantequilla o aceites con alto contenido de grasas saturadas. Cambie de Javed Sprawls al 2% o entera a leche al 1% o descremada. Unte las verduras crudas en un aderezo de vinagreta o puré de garbanzos en lugar de salsas hechas de mayonesa o crema agria. Disfrute un pedazo de fruta para el postre en lugar de un pedazo de torta. Pruebe manzanas al horno, o coma algunas frutas secas.   Consejos al comer afuera  Pruebe pescado cocido, asado a la jessica u horneado, en lugar de comerlo frito o empanado. [place] al camarero (mesero) que le preparen la comida con aceite de partida en lugar de [place]. Pida platos hechos con salsa marinera o salsas hechas con aceite de partida. Evite las salsas hechas con [employer]. Elija panes integrales, pasta y masa de pizza hechas de meliza integral, arroz integral, frijoles y lentejas. Reduzca el consumo de mantequilla o margarina en el pan. En delacruz lugar, puede untar el pan en kelby pequeña cantidad de aceite de partida. [place] acompañamiento, pida kelby ensalada pequeña o verduras asadas en lugar de liz fritas. ¿Dónde puede encontrar más información en [facility]? Huyen Pritchett a [email or web address] e ingrese a delacruz cuenta de MyChart. Ivan Begun V339 en el Sonda [place] \"Search Health Information\" para más información (en inglés) sobre \"Aprenda acerca de la dieta mediterránea. \"     Si no tiene kelby cuenta, brittney frank en el enlace \"Sign Up Now\". Revisado: 5 Garrett, 46               Versión del contenido: 13.4  © 2006-2022 Healthwise, Incorporated. Las instrucciones de cuidado fueron adaptadas bajo licencia por [facility] ([facility]). Si usted tiene [place] afección médica o sobre estas instrucciones, siempre pregunte a delacruz profesional de poncho. Healthwise, Incorporated niega toda garantía o responsabilidad por delacruz uso de esta información.

## 2022-10-26 ENCOUNTER — OFFICE VISIT (OUTPATIENT)
Dept: INTERNAL MEDICINE CLINIC | Facility: CLINIC | Age: 62
End: 2022-10-26
Payer: COMMERCIAL

## 2022-10-26 VITALS — DIASTOLIC BLOOD PRESSURE: 63 MMHG | RESPIRATION RATE: 18 BRPM | SYSTOLIC BLOOD PRESSURE: 127 MMHG | HEART RATE: 81 BPM

## 2022-10-26 DIAGNOSIS — A08.4 VIRAL GASTROENTERITIS: Primary | ICD-10-CM

## 2022-10-26 DIAGNOSIS — K21.9 GASTROESOPHAGEAL REFLUX DISEASE, UNSPECIFIED WHETHER ESOPHAGITIS PRESENT: ICD-10-CM

## 2022-10-26 DIAGNOSIS — Z87.11 HISTORY OF PEPTIC ULCER: ICD-10-CM

## 2022-10-26 DIAGNOSIS — E66.01 SEVERE OBESITY (BMI 35.0-39.9) WITH COMORBIDITY (HCC): ICD-10-CM

## 2022-10-26 DIAGNOSIS — K58.1 IRRITABLE BOWEL SYNDROME WITH CONSTIPATION: ICD-10-CM

## 2022-10-26 PROBLEM — Q25.0 PATENT DUCTUS ARTERIOSUS: Status: ACTIVE | Noted: 2018-01-08

## 2022-10-26 PROCEDURE — 3074F SYST BP LT 130 MM HG: CPT | Performed by: INTERNAL MEDICINE

## 2022-10-26 PROCEDURE — 99213 OFFICE O/P EST LOW 20 MIN: CPT | Performed by: INTERNAL MEDICINE

## 2022-10-26 PROCEDURE — 3078F DIAST BP <80 MM HG: CPT | Performed by: INTERNAL MEDICINE

## 2022-10-26 RX ORDER — BUTALBITAL, ACETAMINOPHEN AND CAFFEINE 50; 325; 40 MG/1; MG/1; MG/1
1 TABLET ORAL EVERY 6 HOURS PRN
Qty: 90 TABLET | Refills: 0 | Status: SHIPPED | OUTPATIENT
Start: 2022-10-26

## 2022-10-26 RX ORDER — CYCLOBENZAPRINE HCL 10 MG
TABLET ORAL
Qty: 30 TABLET | Refills: 3 | Status: SHIPPED | OUTPATIENT
Start: 2022-10-26

## 2022-10-26 RX ORDER — POTASSIUM CHLORIDE 20 MEQ/1
20 TABLET, EXTENDED RELEASE ORAL 2 TIMES DAILY
Qty: 180 TABLET | Refills: 3 | Status: SHIPPED | OUTPATIENT
Start: 2022-10-26

## 2022-10-26 RX ORDER — DEXLANSOPRAZOLE 60 MG/1
60 CAPSULE, DELAYED RELEASE ORAL DAILY
Qty: 30 CAPSULE | Refills: 5 | Status: SHIPPED | OUTPATIENT
Start: 2022-10-26

## 2022-10-26 RX ORDER — SUCRALFATE 1 G/1
1 TABLET ORAL 4 TIMES DAILY
Qty: 120 TABLET | Refills: 3 | Status: SHIPPED | OUTPATIENT
Start: 2022-10-26

## 2022-10-26 ASSESSMENT — ENCOUNTER SYMPTOMS
COUGH: 0
VOMITING: 0
CONSTIPATION: 0
BLOOD IN STOOL: 0
NAUSEA: 0
SHORTNESS OF BREATH: 0
DIARRHEA: 0
WHEEZING: 0

## 2022-10-26 NOTE — PROGRESS NOTES
10/26/2022 12:24 PM  Location:[facility] [address] [facility]  [place]  Patient #:  [id number]  YOB: 1960            History of Present Illness     Chief Complaint   Patient presents with    Follow-up     6 week f/u - can't tell that the Stephan Rojas has helped       Ms. Era Bennett is a 58 y.o. female  who presents for the above. Notes that she took the linzess inconsistently. Only had 9 samples. Got some fluids at  yesterday and got fluids due to persistent diarrhea. No fever or chills but her grandchildren had a viral GI bug. They were vomiting. She has not had any vomiting. Allergies   Allergen Reactions    Latex Rash    Heparin (Bovine) Anaphylaxis     Past Medical History:   Diagnosis Date    Anxiety and depression 12/27/2018    Arthritis of left knee 12/2/2020    Autoimmune disease ([address]     fibromyalgia    CAD (coronary artery disease) 2006    mi    Chest pain     Coagulation defects     hx of dic ?  pe    GERD (gastroesophageal reflux disease)     Hypertension     Palpitations 8/22/2016    Aug 2014 - K+ 3.4, Mg 2.1 7 day monitor - single 8 beat run of atrial ectopy, asymptomatic Feb 2016 - 7 day monitor - normal tracing, all symptoms with NSR    PUD (peptic ulcer disease)     Thromboembolus ([address]     lle and pe's--had had foot surgery; just one time     Social History     Socioeconomic History    Marital status:      Spouse name: None    Number of children: None    Years of education: None    Highest education level: None   Tobacco Use    Smoking status: Never    Smokeless tobacco: Never   Substance and Sexual Activity    Alcohol use: No     Past Surgical History:   Procedure Laterality Date    CARDIAC CATHETERIZATION      CHOLECYSTECTOMY  2002    HEENT      septoplasty,rhinoplasty    PA CARDIAC SURG PROCEDURE UNLIST      cath stents     Current Outpatient Medications   Medication Sig Dispense Refill    potassium chloride (KLOR-CON M) 20 MEQ extended release tablet Take 1 tablet by mouth 2 times daily 180 tablet 3    VORTIoxetine HBr (TRINTELLIX) 20 MG TABS tablet Take 1 tablet by mouth daily 90 tablet 3    cyclobenzaprine (FLEXERIL) 10 MG tablet TAKE 1 TABLET BY MOUTH THREE TIMES DAILY AS NEEDED FOR MUSCLE SPASMS 30 tablet 3    butalbital-acetaminophen-caffeine (FIORICET, ESGIC) -40 MG per tablet Take 1 tablet by mouth every 6 hours as needed for Migraine 90 tablet 0    linaclotide (LINZESS) 290 MCG CAPS capsule Take 1 capsule by mouth every morning (before breakfast) 30 capsule 5    sucralfate (CARAFATE) 1 GM tablet Take 1 tablet by mouth 4 times daily 120 tablet 3    dexlansoprazole (DEXILANT) 60 MG CPDR delayed release capsule Take 1 capsule by mouth daily 30 capsule 5    bisacodyl (DULCOLAX) 5 MG EC tablet Take 5 mg by mouth daily as needed for Constipation      metoprolol succinate (TOPROL XL) 25 MG extended release tablet TAKE 1 TABLET BY MOUTH TWICE DAILY 180 tablet 3    clopidogrel (PLAVIX) 75 MG tablet Take 1 tablet by mouth daily 90 tablet 3    lisinopril (PRINIVIL;ZESTRIL) 20 MG tablet Take 1 tablet by mouth daily 90 tablet 3    rosuvastatin (CRESTOR) 5 MG tablet Take 1 tablet by mouth daily 90 tablet 3    Multiple Vitamin (MULTIVITAMIN ADULT PO) Take by mouth daily      Cholecalciferol (VITAMIN D3) 50 MCG (2000 UT) CAPS Take by mouth daily      zinc 50 MG CAPS Take by mouth daily      vitamin B-12 (CYANOCOBALAMIN) 1000 MCG tablet Take 1,000 mcg by mouth daily      vitamin C (ASCORBIC ACID) 500 MG tablet Take 500 mg by mouth daily      NONFORMULARY D - mannose - 1500 mg once daily ( otc) for uti's      Misc Natural Products (GLUCOSAMINE CHOND CMP TRIPLE PO) Take by mouth daily      QUERCETIN PO Take 800 mg by mouth daily      Pseudoephedrine HCl (SUDAFED 12 HOUR PO) Take by mouth      docusate sodium (COLACE) 100 MG capsule Take 100 mg by mouth daily as needed for Constipation      acetaminophen (TYLENOL) 650 MG extended release tablet Take 2,600 mg by mouth every morning 4 (650 mg) tablets every morning      famotidine (PEPCID) 20 MG tablet Take 20 mg by mouth nightly as needed      cyanocobalamin 1000 MCG/ML injection Inject 1 mL into the muscle every 30 days 10 mL 0    aspirin 81 MG EC tablet Take 81 mg by mouth daily      LORazepam (ATIVAN) 0.5 MG tablet Take 0.5 mg by mouth 2 times daily as needed. guaiFENesin (MUCINEX) 600 MG extended release tablet Take 1,200 mg by mouth 2 times daily       No current facility-administered medications for this visit. Health Maintenance   Topic Date Due    HIV screen  Never done    COVID-19 Vaccine (4 - Booster for Moderna series) 01/18/2022    Depression Monitoring  03/09/2023    A1C test (Diabetic or Prediabetic)  09/14/2023    Lipids  09/14/2023    Breast cancer screen  05/18/2024    Cervical cancer screen  03/09/2027    DTaP/Tdap/Td vaccine (2 - Td or Tdap) 03/20/2028    Colorectal Cancer Screen  11/20/2030    Flu vaccine  Completed    Shingles vaccine  Completed    Hepatitis C screen  Completed    Hepatitis A vaccine  Aged Out    Hib vaccine  Aged Out    Meningococcal (ACWY) vaccine  Aged Out    Pneumococcal 0-64 years Vaccine  Aged Out     Family History   Problem Relation Age of Onset    Alzheimer's Disease Mother     Other Mother         bilateral knee replacement    Heart Disease Father     Coronary Art Dis Father     Diabetes Father              Review of Systems  Review of Systems   Respiratory:  Negative for cough, shortness of breath and wheezing. Cardiovascular:  Negative for chest pain, palpitations and leg swelling. Gastrointestinal:  Negative for blood in stool, constipation, diarrhea, nausea and vomiting. Psychiatric/Behavioral:  The patient is nervous/anxious. /63 (Site: Left Upper Arm, Position: Sitting, Cuff Size: Large Adult)   Pulse 81   Resp 18       Physical Exam    Physical Exam  Constitutional:       General: She is not in acute distress. Appearance: Normal appearance. HENT:      Head: Normocephalic. Neurological:      Mental Status: She is alert and oriented to person, place, and time.    Psychiatric:         Mood and Affect: Mood normal.         Behavior: Behavior normal.         Assessment & Plan    Current Outpatient Medications   Medication Sig Dispense Refill    potassium chloride (KLOR-CON M) 20 MEQ extended release tablet Take 1 tablet by mouth 2 times daily 180 tablet 3    VORTIoxetine HBr (TRINTELLIX) 20 MG TABS tablet Take 1 tablet by mouth daily 90 tablet 3    cyclobenzaprine (FLEXERIL) 10 MG tablet TAKE 1 TABLET BY MOUTH THREE TIMES DAILY AS NEEDED FOR MUSCLE SPASMS 30 tablet 3    butalbital-acetaminophen-caffeine (FIORICET, ESGIC) -40 MG per tablet Take 1 tablet by mouth every 6 hours as needed for Migraine 90 tablet 0    linaclotide (LINZESS) 290 MCG CAPS capsule Take 1 capsule by mouth every morning (before breakfast) 30 capsule 5    sucralfate (CARAFATE) 1 GM tablet Take 1 tablet by mouth 4 times daily 120 tablet 3    dexlansoprazole (DEXILANT) 60 MG CPDR delayed release capsule Take 1 capsule by mouth daily 30 capsule 5    bisacodyl (DULCOLAX) 5 MG EC tablet Take 5 mg by mouth daily as needed for Constipation      metoprolol succinate (TOPROL XL) 25 MG extended release tablet TAKE 1 TABLET BY MOUTH TWICE DAILY 180 tablet 3    clopidogrel (PLAVIX) 75 MG tablet Take 1 tablet by mouth daily 90 tablet 3    lisinopril (PRINIVIL;ZESTRIL) 20 MG tablet Take 1 tablet by mouth daily 90 tablet 3    rosuvastatin (CRESTOR) 5 MG tablet Take 1 tablet by mouth daily 90 tablet 3    Multiple Vitamin (MULTIVITAMIN ADULT PO) Take by mouth daily      Cholecalciferol (VITAMIN D3) 50 MCG (2000 UT) CAPS Take by mouth daily      zinc 50 MG CAPS Take by mouth daily      vitamin B-12 (CYANOCOBALAMIN) 1000 MCG tablet Take 1,000 mcg by mouth daily      vitamin C (ASCORBIC ACID) 500 MG tablet Take 500 mg by mouth daily      NONFORMULARY D - mannose - 1500 mg once daily ( otc) for uti's      Misc Natural Products (GLUCOSAMINE CHOND CMP TRIPLE PO) Take by mouth daily      QUERCETIN PO Take 800 mg by mouth daily      Pseudoephedrine HCl (SUDAFED 12 HOUR PO) Take by mouth      docusate sodium (COLACE) 100 MG capsule Take 100 mg by mouth daily as needed for Constipation      acetaminophen (TYLENOL) 650 MG extended release tablet Take 2,600 mg by mouth every morning 4 (650 mg) tablets every morning      famotidine (PEPCID) 20 MG tablet Take 20 mg by mouth nightly as needed      cyanocobalamin 1000 MCG/ML injection Inject 1 mL into the muscle every 30 days 10 mL 0    aspirin 81 MG EC tablet Take 81 mg by mouth daily      LORazepam (ATIVAN) 0.5 MG tablet Take 0.5 mg by mouth 2 times daily as needed. guaiFENesin (MUCINEX) 600 MG extended release tablet Take 1,200 mg by mouth 2 times daily       No current facility-administered medications for this visit. No orders of the defined types were placed in this encounter.       Orders Placed This Encounter   Medications    potassium chloride (KLOR-CON M) 20 MEQ extended release tablet     Sig: Take 1 tablet by mouth 2 times daily     Dispense:  180 tablet     Refill:  3    VORTIoxetine HBr (TRINTELLIX) 20 MG TABS tablet     Sig: Take 1 tablet by mouth daily     Dispense:  90 tablet     Refill:  3    cyclobenzaprine (FLEXERIL) 10 MG tablet     Sig: TAKE 1 TABLET BY MOUTH THREE TIMES DAILY AS NEEDED FOR MUSCLE SPASMS     Dispense:  30 tablet     Refill:  3    butalbital-acetaminophen-caffeine (FIORICET, ESGIC) -40 MG per tablet     Sig: Take 1 tablet by mouth every 6 hours as needed for Migraine     Dispense:  90 tablet     Refill:  0    linaclotide (LINZESS) 290 MCG CAPS capsule     Sig: Take 1 capsule by mouth every morning (before breakfast)     Dispense:  30 capsule     Refill:  5    sucralfate (CARAFATE) 1 GM tablet     Sig: Take 1 tablet by mouth 4 times daily     Dispense:  120 tablet     Refill:  3 dexlansoprazole (DEXILANT) 60 MG CPDR delayed release capsule     Sig: Take 1 capsule by mouth daily     Dispense:  30 capsule     Refill:  5     Has failed pantoprazole and omeprazole         Medications Discontinued During This Encounter   Medication Reason    Desloratadine (CLARINEX PO) LIST CLEANUP    linaclotide (LINZESS) 290 MCG CAPS capsule LIST CLEANUP    butalbital-acetaminophen-caffeine (FIORICET, ESGIC) -40 MG per tablet REORDER    potassium chloride (KLOR-CON M) 20 MEQ extended release tablet REORDER    cyclobenzaprine (FLEXERIL) 10 MG tablet REORDER    VORTIoxetine HBr (TRINTELLIX) 20 MG TABS tablet REORDER    pantoprazole (PROTONIX) 40 MG tablet Alternate therapy    omeprazole (PRILOSEC) 20 MG delayed release capsule Alternate therapy        Diagnosis Orders   1. Viral gastroenteritis        2. Severe obesity (BMI 35.0-39. 9) with comorbidity ([address]        3. Gastroesophageal reflux disease, unspecified whether esophagitis present        4. Irritable bowel syndrome with constipation        5. History of peptic ulcer           The 'BRAT' diet is suggested, then progress to diet as tolerated as symptoms jared. She is improving. Reviewed labs from her urgent care visit. Advised her to restart the Linzess after she has had 24 hours of new diarrhea. I would like for her to give it at least a week off of her bisacodyl and her docusate. She knows to reach out if this is ineffective. Also, she is not getting good results with pantoprazole and omeprazole. Will provide Dexilant and anticipate a prior authorization. She can use Carafate as needed as well. She knows to keep a low threshold for reaching out with any worsening symptoms. Vitals look good outside of her weight. Follow-up as above or earlier if needed. Return in about 3 months (around 1/26/2023).           Nell Munoz MD

## 2022-10-31 RX ORDER — HYDROXYZINE PAMOATE 25 MG/1
CAPSULE ORAL
Qty: 60 CAPSULE | Refills: 2 | Status: SHIPPED | OUTPATIENT
Start: 2022-10-31

## 2022-11-11 ENCOUNTER — PATIENT MESSAGE (OUTPATIENT)
Dept: INTERNAL MEDICINE CLINIC | Facility: CLINIC | Age: 62
End: 2022-11-11

## 2022-11-11 NOTE — TELEPHONE ENCOUNTER
From: Christine Quick  To: Dr. Mckeon Ket: 11/11/2022 8:57 AM EST  Subject: Need meds for Sinus Infection     I have had congestion for several weeks now. I have been taking otc meds but its turned into a sinus infection regardless. Symptoms: headaches, congestion, stuffy & runny nose, sinus pressure & pain in cheeks, yellow/green mucus, occasional pain in ears    Dr. Susana Kahn always gave me a antibiotic, steroid pac & meds for yeast infection. I always get one when I take antibiotics. (At least 2 diflucan please)    We are going to the beach next week. I would like to feel better so I can enjoy my vacay.

## 2022-11-12 ENCOUNTER — E-VISIT (OUTPATIENT)
Dept: INTERNAL MEDICINE CLINIC | Facility: CLINIC | Age: 62
End: 2022-11-12
Payer: COMMERCIAL

## 2022-11-12 ENCOUNTER — TELEPHONE (OUTPATIENT)
Dept: INTERNAL MEDICINE CLINIC | Facility: CLINIC | Age: 62
End: 2022-11-12

## 2022-11-12 DIAGNOSIS — J40 BRONCHITIS: Primary | ICD-10-CM

## 2022-11-12 DIAGNOSIS — J01.90 SUBACUTE SINUSITIS, UNSPECIFIED LOCATION: Primary | ICD-10-CM

## 2022-11-12 PROCEDURE — 99421 OL DIG E/M SVC 5-10 MIN: CPT | Performed by: INTERNAL MEDICINE

## 2022-11-12 RX ORDER — FLUCONAZOLE 150 MG/1
150 TABLET ORAL ONCE
Qty: 1 TABLET | Refills: 0 | Status: SHIPPED | OUTPATIENT
Start: 2022-11-12 | End: 2022-11-12

## 2022-11-12 RX ORDER — AZITHROMYCIN 250 MG/1
250 TABLET, FILM COATED ORAL SEE ADMIN INSTRUCTIONS
Qty: 6 TABLET | Refills: 0 | Status: SHIPPED | OUTPATIENT
Start: 2022-11-12 | End: 2022-11-17

## 2022-11-12 RX ORDER — PREDNISONE 20 MG/1
20 TABLET ORAL 2 TIMES DAILY
Qty: 6 TABLET | Refills: 0 | Status: SHIPPED | OUTPATIENT
Start: 2022-11-12 | End: 2022-11-15

## 2022-11-12 ASSESSMENT — LIFESTYLE VARIABLES: SMOKING_STATUS: NO, I'VE NEVER SMOKED

## 2022-11-12 NOTE — TELEPHONE ENCOUNTER
Patient reports several weeks of worsening sinus congestion, thick discharge. No fever. No chest congestion or cough.   Request abx, steriods and diflucan

## 2022-11-13 RX ORDER — FLUCONAZOLE 150 MG/1
150 TABLET ORAL ONCE
Qty: 1 TABLET | Refills: 0 | Status: SHIPPED | OUTPATIENT
Start: 2022-11-13 | End: 2022-11-13

## 2022-11-13 RX ORDER — PREDNISONE 10 MG/1
TABLET ORAL
Qty: 21 EACH | Refills: 0 | Status: SHIPPED | OUTPATIENT
Start: 2022-11-13

## 2022-11-13 RX ORDER — AZITHROMYCIN 250 MG/1
250 TABLET, FILM COATED ORAL SEE ADMIN INSTRUCTIONS
Qty: 6 TABLET | Refills: 0 | Status: SHIPPED | OUTPATIENT
Start: 2022-11-13 | End: 2022-11-18

## 2022-11-13 NOTE — PROGRESS NOTES
Coni Angel (1960) initiated an asynchronous digital communication through [address] [place] [address]. HPI: per patient questionnaire     Exam: not applicable    Diagnoses and all orders for this visit:  Diagnoses and all orders for this visit:    Bronchitis  -     azithromycin (ZITHROMAX) 250 MG tablet; Take 1 tablet by mouth See Admin Instructions for 5 days 500mg on day 1 followed by 250mg on days 2 - 5    Other orders  -     predniSONE 10 MG (21) TBPK; Take as directed  -     fluconazole (DIFLUCAN) 150 MG tablet; Take 1 tablet by mouth once for 1 dose          Time: EV1 - 5-10 minutes were spent on the digital evaluation and management of this patient.     Bekah Arenas MD

## 2023-01-13 ENCOUNTER — TELEPHONE (OUTPATIENT)
Dept: CARDIOLOGY CLINIC | Age: 63
End: 2023-01-13

## 2023-01-13 ENCOUNTER — OFFICE VISIT (OUTPATIENT)
Dept: CARDIOLOGY CLINIC | Age: 63
End: 2023-01-13
Payer: COMMERCIAL

## 2023-01-13 VITALS
BODY MASS INDEX: 35.16 KG/M2 | DIASTOLIC BLOOD PRESSURE: 72 MMHG | HEIGHT: 67 IN | WEIGHT: 224 LBS | HEART RATE: 72 BPM | SYSTOLIC BLOOD PRESSURE: 132 MMHG

## 2023-01-13 DIAGNOSIS — I25.10 CORONARY ARTERY DISEASE INVOLVING NATIVE HEART WITHOUT ANGINA PECTORIS, UNSPECIFIED VESSEL OR LESION TYPE: ICD-10-CM

## 2023-01-13 DIAGNOSIS — I10 ESSENTIAL HYPERTENSION: ICD-10-CM

## 2023-01-13 DIAGNOSIS — R00.2 PALPITATIONS: Primary | ICD-10-CM

## 2023-01-13 DIAGNOSIS — E78.2 MIXED HYPERLIPIDEMIA: ICD-10-CM

## 2023-01-13 DIAGNOSIS — F41.1 GENERALIZED ANXIETY DISORDER: ICD-10-CM

## 2023-01-13 DIAGNOSIS — R00.2 PALPITATIONS: ICD-10-CM

## 2023-01-13 LAB
25(OH)D3 SERPL-MCNC: 41 NG/ML (ref 30–100)
ALBUMIN SERPL-MCNC: 4.2 G/DL (ref 3.2–4.6)
ALBUMIN/GLOB SERPL: 1.5 (ref 0.4–1.6)
ALP SERPL-CCNC: 87 U/L (ref 50–136)
ALT SERPL-CCNC: 29 U/L (ref 12–65)
ANION GAP SERPL CALC-SCNC: 5 MMOL/L (ref 2–11)
AST SERPL-CCNC: 14 U/L (ref 15–37)
BASOPHILS # BLD: 0.1 K/UL (ref 0–0.2)
BASOPHILS NFR BLD: 1 % (ref 0–2)
BILIRUB SERPL-MCNC: 0.8 MG/DL (ref 0.2–1.1)
BUN SERPL-MCNC: 11 MG/DL (ref 8–23)
CALCIUM SERPL-MCNC: 9.9 MG/DL (ref 8.3–10.4)
CHLORIDE SERPL-SCNC: 108 MMOL/L (ref 101–110)
CHOLEST SERPL-MCNC: 122 MG/DL
CO2 SERPL-SCNC: 29 MMOL/L (ref 21–32)
CREAT SERPL-MCNC: 0.8 MG/DL (ref 0.6–1)
DIFFERENTIAL METHOD BLD: NORMAL
EOSINOPHIL # BLD: 0.2 K/UL (ref 0–0.8)
EOSINOPHIL NFR BLD: 4 % (ref 0.5–7.8)
ERYTHROCYTE [DISTWIDTH] IN BLOOD BY AUTOMATED COUNT: 13.5 % (ref 11.9–14.6)
GLOBULIN SER CALC-MCNC: 2.8 G/DL (ref 2.8–4.5)
GLUCOSE SERPL-MCNC: 93 MG/DL (ref 65–100)
HCT VFR BLD AUTO: 44 % (ref 35.8–46.3)
HDLC SERPL-MCNC: 48 MG/DL (ref 40–60)
HDLC SERPL: 2.5
HGB BLD-MCNC: 14.7 G/DL (ref 11.7–15.4)
IMM GRANULOCYTES # BLD AUTO: 0 K/UL (ref 0–0.5)
IMM GRANULOCYTES NFR BLD AUTO: 0 % (ref 0–5)
IRON SERPL-MCNC: 128 UG/DL (ref 35–150)
LDLC SERPL CALC-MCNC: 46.4 MG/DL
LYMPHOCYTES # BLD: 1.8 K/UL (ref 0.5–4.6)
LYMPHOCYTES NFR BLD: 26 % (ref 13–44)
MCH RBC QN AUTO: 30.5 PG (ref 26.1–32.9)
MCHC RBC AUTO-ENTMCNC: 33.4 G/DL (ref 31.4–35)
MCV RBC AUTO: 91.3 FL (ref 82–102)
MONOCYTES # BLD: 0.5 K/UL (ref 0.1–1.3)
MONOCYTES NFR BLD: 7 % (ref 4–12)
NEUTS SEG # BLD: 4.3 K/UL (ref 1.7–8.2)
NEUTS SEG NFR BLD: 62 % (ref 43–78)
NRBC # BLD: 0 K/UL (ref 0–0.2)
PLATELET # BLD AUTO: 205 K/UL (ref 150–450)
PMV BLD AUTO: 9.8 FL (ref 9.4–12.3)
POTASSIUM SERPL-SCNC: 4.4 MMOL/L (ref 3.5–5.1)
PROT SERPL-MCNC: 7 G/DL (ref 6.3–8.2)
RBC # BLD AUTO: 4.82 M/UL (ref 4.05–5.2)
SODIUM SERPL-SCNC: 142 MMOL/L (ref 133–143)
T4 FREE SERPL-MCNC: 0.8 NG/DL (ref 0.78–1.46)
TRIGL SERPL-MCNC: 138 MG/DL (ref 35–150)
TSH, 3RD GENERATION: 0.98 UIU/ML (ref 0.36–3.74)
VLDLC SERPL CALC-MCNC: 27.6 MG/DL (ref 6–23)
WBC # BLD AUTO: 6.9 K/UL (ref 4.3–11.1)

## 2023-01-13 PROCEDURE — 3075F SYST BP GE 130 - 139MM HG: CPT | Performed by: INTERNAL MEDICINE

## 2023-01-13 PROCEDURE — 99215 OFFICE O/P EST HI 40 MIN: CPT | Performed by: INTERNAL MEDICINE

## 2023-01-13 PROCEDURE — 3078F DIAST BP <80 MM HG: CPT | Performed by: INTERNAL MEDICINE

## 2023-01-13 RX ORDER — LISINOPRIL 40 MG/1
40 TABLET ORAL DAILY
Qty: 90 TABLET | Refills: 3 | Status: SHIPPED | OUTPATIENT
Start: 2023-01-13

## 2023-01-13 NOTE — PROGRESS NOTES
[address], [address] [place] [address], [address] [place] [address]  PHONE: [phone]    SUBJECTIVE: Albertina Angel (1960) is a 58 y.o. female seen for a follow up visit regarding the following:   Specialty Problems          Cardiology Problems    Patent ductus arteriosus        CAD (coronary artery disease)        Hypertension        Atherosclerosis of coronary artery        Hyperlipidemia        Migraine without aura and without status migrainosus, not intractable        Internal hemorrhoids         Pt is here for increasing irregular heart rate and high BP at home, 313-108 systolic. States she also wakes up in the middle of the night 3-4x per night with heart heart racing, has been an intermittent chronic problem. Typically sees Dr. Jarod Elaine for palpitations for which she is on chronic BB therapy. Hx of hyperlipidemia, HTN and CAD which she takes lisinopril and crestor for. History of anxiety and depression. BP stable at 132/72. EKG in October showed normal sinus rhythm. Did have a heart attack in 2007, and has had some anxiety regarding cardiac health since. Denies chest pain, shortness of breath, leg swelling. Past Medical History, Past Surgical History, Family history, Social History, and Medications were all reviewed with the patient today and updated as necessary. Allergies   Allergen Reactions    Latex Rash    Heparin (Bovine) Anaphylaxis     Past Medical History:   Diagnosis Date    Anxiety and depression 12/27/2018    Arthritis of left knee 12/2/2020    Autoimmune disease ([facility]     fibromyalgia    CAD (coronary artery disease) 2006    mi    Chest pain     Coagulation defects     hx of dic ?  pe    GERD (gastroesophageal reflux disease)     Hypertension     Palpitations 8/22/2016    Aug 2014 - K+ 3.4, Mg 2.1 7 day monitor - single 8 beat run of atrial ectopy, asymptomatic Feb 2016 - 7 day monitor - normal tracing, all symptoms with NSR    PUD (peptic ulcer disease) Thromboembolus ([address]     lle and pe's--had had foot surgery; just one time     Past Surgical History:   Procedure Laterality Date    CARDIAC CATHETERIZATION      CHOLECYSTECTOMY  2002    HEENT      septoplasty,rhinoplasty    HI UNLISTED PROCEDURE CARDIAC SURGERY      cath stents     Family History   Problem Relation Age of Onset    Alzheimer's Disease Mother     Other Mother         bilateral knee replacement    Heart Disease Father     Coronary Art Dis Father     Diabetes Father       Social History     Tobacco Use    Smoking status: Never    Smokeless tobacco: Never   Substance Use Topics    Alcohol use: No       Current Outpatient Medications:     predniSONE 10 MG (21) TBPK, Take as directed, Disp: 21 each, Rfl: 0    hydrOXYzine pamoate (VISTARIL) 25 MG capsule, TAKE 1 TO 2 CAPSULES BY MOUTH EVERY 6 HOURS AS NEEDED FOR ANXIETY OR SLEEP, Disp: 60 capsule, Rfl: 2    potassium chloride (KLOR-CON M) 20 MEQ extended release tablet, Take 1 tablet by mouth 2 times daily, Disp: 180 tablet, Rfl: 3    VORTIoxetine HBr (TRINTELLIX) 20 MG TABS tablet, Take 1 tablet by mouth daily, Disp: 90 tablet, Rfl: 3    cyclobenzaprine (FLEXERIL) 10 MG tablet, TAKE 1 TABLET BY MOUTH THREE TIMES DAILY AS NEEDED FOR MUSCLE SPASMS, Disp: 30 tablet, Rfl: 3    butalbital-acetaminophen-caffeine (FIORICET, ESGIC) -40 MG per tablet, Take 1 tablet by mouth every 6 hours as needed for Migraine, Disp: 90 tablet, Rfl: 0    linaclotide (LINZESS) 290 MCG CAPS capsule, Take 1 capsule by mouth every morning (before breakfast), Disp: 30 capsule, Rfl: 5    sucralfate (CARAFATE) 1 GM tablet, Take 1 tablet by mouth 4 times daily, Disp: 120 tablet, Rfl: 3    dexlansoprazole (DEXILANT) 60 MG CPDR delayed release capsule, Take 1 capsule by mouth daily, Disp: 30 capsule, Rfl: 5    bisacodyl (DULCOLAX) 5 MG EC tablet, Take 5 mg by mouth daily as needed for Constipation, Disp: , Rfl:     metoprolol succinate (TOPROL XL) 25 MG extended release tablet, TAKE 1 TABLET BY MOUTH TWICE DAILY, Disp: 180 tablet, Rfl: 3    clopidogrel (PLAVIX) 75 MG tablet, Take 1 tablet by mouth daily, Disp: 90 tablet, Rfl: 3    lisinopril (PRINIVIL;ZESTRIL) 20 MG tablet, Take 1 tablet by mouth daily, Disp: 90 tablet, Rfl: 3    rosuvastatin (CRESTOR) 5 MG tablet, Take 1 tablet by mouth daily, Disp: 90 tablet, Rfl: 3    Multiple Vitamin (MULTIVITAMIN ADULT PO), Take by mouth daily, Disp: , Rfl:     Cholecalciferol (VITAMIN D3) 50 MCG (2000 UT) CAPS, Take by mouth daily, Disp: , Rfl:     zinc 50 MG CAPS, Take by mouth daily, Disp: , Rfl:     vitamin B-12 (CYANOCOBALAMIN) 1000 MCG tablet, Take 1,000 mcg by mouth daily, Disp: , Rfl:     vitamin C (ASCORBIC ACID) 500 MG tablet, Take 500 mg by mouth daily, Disp: , Rfl:     NONFORMULARY, D - mannose - 1500 mg once daily ( otc) for uti's, Disp: , Rfl:     Misc Natural Products (GLUCOSAMINE CHOND CMP TRIPLE PO), Take by mouth daily, Disp: , Rfl:     QUERCETIN PO, Take 800 mg by mouth daily, Disp: , Rfl:     guaiFENesin (MUCINEX) 600 MG extended release tablet, Take 1,200 mg by mouth 2 times daily, Disp: , Rfl:     Pseudoephedrine HCl (SUDAFED 12 HOUR PO), Take by mouth, Disp: , Rfl:     docusate sodium (COLACE) 100 MG capsule, Take 100 mg by mouth daily as needed for Constipation, Disp: , Rfl:     acetaminophen (TYLENOL) 650 MG extended release tablet, Take 2,600 mg by mouth every morning 4 (650 mg) tablets every morning, Disp: , Rfl:     famotidine (PEPCID) 20 MG tablet, Take 20 mg by mouth nightly as needed, Disp: , Rfl:     cyanocobalamin 1000 MCG/ML injection, Inject 1 mL into the muscle every 30 days, Disp: 10 mL, Rfl: 0    aspirin 81 MG EC tablet, Take 81 mg by mouth daily, Disp: , Rfl:     LORazepam (ATIVAN) 0.5 MG tablet, Take 0.5 mg by mouth 2 times daily as needed. , Disp: , Rfl:     ROS:  Review of Systems - General ROS: negative for - chills, fatigue or fever  Hematological and Lymphatic ROS: negative for - bleeding problems, bruising or jaundice  Respiratory ROS: no cough, shortness of breath, or wheezing  Cardiovascular ROS: no chest pain or dyspnea on exertion  positive for - tachycardia when waking up from sleep  Gastrointestinal ROS: no abdominal pain, change in bowel habits, or black or bloody stools  Neurological ROS: no TIA or stroke symptoms  All other systems negative. Wt Readings from Last 3 Encounters:   01/13/23 224 lb (101.6 kg)   10/14/22 229 lb 11.2 oz (104.2 kg)   09/14/22 230 lb (104.3 kg)     Temp Readings from Last 3 Encounters:   No data found for Temp     BP Readings from Last 3 Encounters:   10/26/22 127/63   10/14/22 130/80   09/14/22 127/72     Pulse Readings from Last 3 Encounters:   10/26/22 81   10/14/22 79   09/14/22 61       PHYSICAL EXAM:  Ht 5' 7\" (1.702 m)   Wt 224 lb (101.6 kg)   BMI 35.08 kg/m²   Physical Examination: General appearance - alert, well appearing, and in no distress  Mental status - alert, oriented to person, place, and time  Eyes - pupils equal and reactive, extraocular eye movements intact  Neck/lymph - supple, no significant adenopathy  Chest/lungs - clear to auscultation, no wheezes, rales or rhonchi, symmetric air entry  Heart/CV - normal rate, regular rhythm, normal S1, S2, no murmurs, rubs, clicks or gallops  Abdomen/GI - soft, nontender, nondistended, no masses or organomegaly  Musculoskeletal - no joint tenderness, deformity or swelling  Extremities - peripheral pulses normal, no pedal edema, no clubbing or cyanosis  Skin - normal coloration and turgor, no rashes, no suspicious skin lesions noted    EKG: normal EKG, normal sinus rhythm. Medications reviewed and questions answered    No results found for this or any previous visit (from the past 672 hour(s)).   Lab Results   Component Value Date/Time    CHOL 116 09/14/2022 12:00 PM    HDL 49 09/14/2022 12:00 PM    VLDL 24 11/24/2021 11:34 AM       ASSESSMENT and PLAN  Problem List Items Addressed This Visit          Circulatory Palpitations - Primary    CAD (coronary artery disease)       Other    Hyperlipidemia    Generalized anxiety disorder     Other Visit Diagnoses       Essential hypertension              1/13/2023  Mild concern for cardiac issues  -Stress Management   -Cardiac monitor on phone and track rhythm during episodes that are concerning.   -Follow up with Dr. Arevalo Husbands in 3 months. Will increase lisinopril from 20 to 40 mg a day to combat a recent trend of increase in blood pressures.   I have recommended an apple watch or other smart device to monitor rhythms when she is noticing that they are abnormal and have demonstrated how to use a watch    Have reordered labs    Have recommended a sleep study for increasing incidence of nocturnal palpitations and increasingly difficult to control blood pressure these are often early subtle signs of obstructive sleep apnea and she may benefit from a sleep study      Continue meds as below    Current Outpatient Medications:     predniSONE 10 MG (21) TBPK, Take as directed, Disp: 21 each, Rfl: 0    hydrOXYzine pamoate (VISTARIL) 25 MG capsule, TAKE 1 TO 2 CAPSULES BY MOUTH EVERY 6 HOURS AS NEEDED FOR ANXIETY OR SLEEP, Disp: 60 capsule, Rfl: 2    potassium chloride (KLOR-CON M) 20 MEQ extended release tablet, Take 1 tablet by mouth 2 times daily, Disp: 180 tablet, Rfl: 3    VORTIoxetine HBr (TRINTELLIX) 20 MG TABS tablet, Take 1 tablet by mouth daily, Disp: 90 tablet, Rfl: 3    cyclobenzaprine (FLEXERIL) 10 MG tablet, TAKE 1 TABLET BY MOUTH THREE TIMES DAILY AS NEEDED FOR MUSCLE SPASMS, Disp: 30 tablet, Rfl: 3    butalbital-acetaminophen-caffeine (FIORICET, ESGIC) -40 MG per tablet, Take 1 tablet by mouth every 6 hours as needed for Migraine, Disp: 90 tablet, Rfl: 0    linaclotide (LINZESS) 290 MCG CAPS capsule, Take 1 capsule by mouth every morning (before breakfast), Disp: 30 capsule, Rfl: 5    sucralfate (CARAFATE) 1 GM tablet, Take 1 tablet by mouth 4 times daily, Disp: 120 tablet, Rfl: 3    dexlansoprazole (DEXILANT) 60 MG CPDR delayed release capsule, Take 1 capsule by mouth daily, Disp: 30 capsule, Rfl: 5    bisacodyl (DULCOLAX) 5 MG EC tablet, Take 5 mg by mouth daily as needed for Constipation, Disp: , Rfl:     metoprolol succinate (TOPROL XL) 25 MG extended release tablet, TAKE 1 TABLET BY MOUTH TWICE DAILY, Disp: 180 tablet, Rfl: 3    clopidogrel (PLAVIX) 75 MG tablet, Take 1 tablet by mouth daily, Disp: 90 tablet, Rfl: 3    lisinopril (PRINIVIL;ZESTRIL) 20 MG tablet, Take 1 tablet by mouth daily, Disp: 90 tablet, Rfl: 3    rosuvastatin (CRESTOR) 5 MG tablet, Take 1 tablet by mouth daily, Disp: 90 tablet, Rfl: 3    Multiple Vitamin (MULTIVITAMIN ADULT PO), Take by mouth daily, Disp: , Rfl:     Cholecalciferol (VITAMIN D3) 50 MCG (2000 UT) CAPS, Take by mouth daily, Disp: , Rfl:     zinc 50 MG CAPS, Take by mouth daily, Disp: , Rfl:     vitamin B-12 (CYANOCOBALAMIN) 1000 MCG tablet, Take 1,000 mcg by mouth daily, Disp: , Rfl:     vitamin C (ASCORBIC ACID) 500 MG tablet, Take 500 mg by mouth daily, Disp: , Rfl:     NONFORMULARY, D - mannose - 1500 mg once daily ( otc) for uti's, Disp: , Rfl:     Misc Natural Products (GLUCOSAMINE CHOND CMP TRIPLE PO), Take by mouth daily, Disp: , Rfl:     QUERCETIN PO, Take 800 mg by mouth daily, Disp: , Rfl:     guaiFENesin (MUCINEX) 600 MG extended release tablet, Take 1,200 mg by mouth 2 times daily, Disp: , Rfl:     Pseudoephedrine HCl (SUDAFED 12 HOUR PO), Take by mouth, Disp: , Rfl:     docusate sodium (COLACE) 100 MG capsule, Take 100 mg by mouth daily as needed for Constipation, Disp: , Rfl:     acetaminophen (TYLENOL) 650 MG extended release tablet, Take 2,600 mg by mouth every morning 4 (650 mg) tablets every morning, Disp: , Rfl:     famotidine (PEPCID) 20 MG tablet, Take 20 mg by mouth nightly as needed, Disp: , Rfl:     cyanocobalamin 1000 MCG/ML injection, Inject 1 mL into the muscle every 30 days, Disp: 10 mL, Rfl: 0    aspirin 81 MG EC tablet, Take 81 mg by mouth daily, Disp: , Rfl:     LORazepam (ATIVAN) 0.5 MG tablet, Take 0.5 mg by mouth 2 times daily as needed. , Disp: , RflRemus Chackon  1/13/2023  11:01 AM    Pt is instructed to follow all appropriate cardiac risk factor recommendations and to be compliant with meds and testing. Instructed to follow up appropriately and seek urgent medical care if acute or unstable issues arise. Results of some tests may be viewed thru [address] but this does not substitute for follow up with MD. If follow up is not scheduled pt is instructed to call for follow up    I have personally seen and evaluated the patient and reviewed the students note and agree with the following assessment and plan and findings. I was present for and observed the key components of this note. Any appropriate additions or editing of the information have been done by me.     Patsy Proctor MD, [facility]  Cardiology

## 2023-01-19 ENCOUNTER — OFFICE VISIT (OUTPATIENT)
Dept: INTERNAL MEDICINE CLINIC | Facility: CLINIC | Age: 63
End: 2023-01-19
Payer: COMMERCIAL

## 2023-01-19 ENCOUNTER — PATIENT MESSAGE (OUTPATIENT)
Dept: INTERNAL MEDICINE CLINIC | Facility: CLINIC | Age: 63
End: 2023-01-19

## 2023-01-19 VITALS
HEIGHT: 67 IN | RESPIRATION RATE: 18 BRPM | HEART RATE: 56 BPM | WEIGHT: 225 LBS | BODY MASS INDEX: 35.31 KG/M2 | SYSTOLIC BLOOD PRESSURE: 127 MMHG | DIASTOLIC BLOOD PRESSURE: 72 MMHG

## 2023-01-19 DIAGNOSIS — R00.2 PALPITATIONS: Primary | ICD-10-CM

## 2023-01-19 DIAGNOSIS — R73.01 IFG (IMPAIRED FASTING GLUCOSE): ICD-10-CM

## 2023-01-19 DIAGNOSIS — E66.01 SEVERE OBESITY (BMI 35.0-39.9) WITH COMORBIDITY (HCC): ICD-10-CM

## 2023-01-19 PROCEDURE — 3074F SYST BP LT 130 MM HG: CPT | Performed by: INTERNAL MEDICINE

## 2023-01-19 PROCEDURE — 93000 ELECTROCARDIOGRAM COMPLETE: CPT | Performed by: INTERNAL MEDICINE

## 2023-01-19 PROCEDURE — 99214 OFFICE O/P EST MOD 30 MIN: CPT | Performed by: INTERNAL MEDICINE

## 2023-01-19 PROCEDURE — 3078F DIAST BP <80 MM HG: CPT | Performed by: INTERNAL MEDICINE

## 2023-01-19 RX ORDER — ESCITALOPRAM OXALATE 5 MG/1
5 TABLET ORAL DAILY
Qty: 90 TABLET | Refills: 1 | Status: SHIPPED | OUTPATIENT
Start: 2023-01-19

## 2023-01-19 ASSESSMENT — ENCOUNTER SYMPTOMS
CONSTIPATION: 0
DIARRHEA: 0
SHORTNESS OF BREATH: 1
VOMITING: 0
NAUSEA: 0

## 2023-01-19 NOTE — PROGRESS NOTES
1/19/2023 2:39 PM  Location:[facility] [address] [facility]  [place]  Patient #:  [id number]  YOB: 1960            History of Present Illness     Chief Complaint   Patient presents with    3 Month Follow-Up     3 month f/u    Anxiety     Complains with anxiety and depression. She could not tolerate the Buspar. Palpitations     Having palpitations - is seeing a cardiologist - [address] Cardiology       Ms. Walter Everett is a 58 y.o. female  who presents for the above. Is not exercising regularly. Notes that she got an apple watch to help monitor her heart rate. Notes that her BP at home is always elevated. Has some tension within her family. Had an echocardiogram but no EKG when she saw the cardiologist.  She is still concerned about her heart. Allergies   Allergen Reactions    Latex Rash    Heparin (Bovine) Anaphylaxis     Past Medical History:   Diagnosis Date    Anxiety and depression 12/27/2018    Arthritis of left knee 12/2/2020    Autoimmune disease ([address]     fibromyalgia    CAD (coronary artery disease) 2006    mi    Chest pain     Coagulation defects     hx of dic ?  pe    GERD (gastroesophageal reflux disease)     Hypertension     Palpitations 8/22/2016    Aug 2014 - K+ 3.4, Mg 2.1 7 day monitor - single 8 beat run of atrial ectopy, asymptomatic Feb 2016 - 7 day monitor - normal tracing, all symptoms with NSR    PUD (peptic ulcer disease)     Thromboembolus ([address]     lle and pe's--had had foot surgery; just one time     Social History     Socioeconomic History    Marital status:      Spouse name: None    Number of children: None    Years of education: None    Highest education level: None   Tobacco Use    Smoking status: Never    Smokeless tobacco: Never   Substance and Sexual Activity    Alcohol use: No     Past Surgical History:   Procedure Laterality Date    CARDIAC CATHETERIZATION      CHOLECYSTECTOMY  2002    HEENT      septoplasty,rhinoplasty OR UNLISTED PROCEDURE CARDIAC SURGERY      cath stents     Current Outpatient Medications   Medication Sig Dispense Refill    escitalopram (LEXAPRO) 5 MG tablet Take 1 tablet by mouth daily 90 tablet 1    lisinopril (PRINIVIL;ZESTRIL) 40 MG tablet Take 1 tablet by mouth daily 90 tablet 3    hydrOXYzine pamoate (VISTARIL) 25 MG capsule TAKE 1 TO 2 CAPSULES BY MOUTH EVERY 6 HOURS AS NEEDED FOR ANXIETY OR SLEEP 60 capsule 2    potassium chloride (KLOR-CON M) 20 MEQ extended release tablet Take 1 tablet by mouth 2 times daily 180 tablet 3    VORTIoxetine HBr (TRINTELLIX) 20 MG TABS tablet Take 1 tablet by mouth daily 90 tablet 3    cyclobenzaprine (FLEXERIL) 10 MG tablet TAKE 1 TABLET BY MOUTH THREE TIMES DAILY AS NEEDED FOR MUSCLE SPASMS 30 tablet 3    butalbital-acetaminophen-caffeine (FIORICET, ESGIC) -40 MG per tablet Take 1 tablet by mouth every 6 hours as needed for Migraine 90 tablet 0    linaclotide (LINZESS) 290 MCG CAPS capsule Take 1 capsule by mouth every morning (before breakfast) 30 capsule 5    sucralfate (CARAFATE) 1 GM tablet Take 1 tablet by mouth 4 times daily 120 tablet 3    dexlansoprazole (DEXILANT) 60 MG CPDR delayed release capsule Take 1 capsule by mouth daily 30 capsule 5    bisacodyl (DULCOLAX) 5 MG EC tablet Take 5 mg by mouth daily as needed for Constipation      metoprolol succinate (TOPROL XL) 25 MG extended release tablet TAKE 1 TABLET BY MOUTH TWICE DAILY 180 tablet 3    clopidogrel (PLAVIX) 75 MG tablet Take 1 tablet by mouth daily 90 tablet 3    rosuvastatin (CRESTOR) 5 MG tablet Take 1 tablet by mouth daily 90 tablet 3    Multiple Vitamin (MULTIVITAMIN ADULT PO) Take by mouth daily      Cholecalciferol (VITAMIN D3) 50 MCG (2000 UT) CAPS Take by mouth daily      zinc 50 MG CAPS Take by mouth daily      vitamin C (ASCORBIC ACID) 500 MG tablet Take 500 mg by mouth daily      NONFORMULARY D - mannose - 1500 mg once daily ( otc) for uti's      Misc Natural Products (GLUCOSAMINE CHOND CMP TRIPLE PO) Take by mouth daily      QUERCETIN PO Take 800 mg by mouth daily      guaiFENesin (MUCINEX) 600 MG extended release tablet Take 1,200 mg by mouth 2 times daily      docusate sodium (COLACE) 100 MG capsule Take 100 mg by mouth daily as needed for Constipation      acetaminophen (TYLENOL) 650 MG extended release tablet Take 2,600 mg by mouth every morning 4 (650 mg) tablets every morning      famotidine (PEPCID) 20 MG tablet Take 20 mg by mouth nightly as needed      cyanocobalamin 1000 MCG/ML injection Inject 1 mL into the muscle every 30 days 10 mL 0    aspirin 81 MG EC tablet Take 81 mg by mouth daily      LORazepam (ATIVAN) 0.5 MG tablet Take 0.5 mg by mouth 2 times daily as needed. No current facility-administered medications for this visit. Health Maintenance   Topic Date Due    HIV screen  Never done    COVID-19 Vaccine (4 - Booster for Moderna series) 01/18/2022    Depression Monitoring  03/09/2023    A1C test (Diabetic or Prediabetic)  09/14/2023    Lipids  01/13/2024    Breast cancer screen  05/18/2024    Cervical cancer screen  03/09/2027    DTaP/Tdap/Td vaccine (2 - Td or Tdap) 03/20/2028    Colorectal Cancer Screen  11/20/2030    Flu vaccine  Completed    Shingles vaccine  Completed    Hepatitis C screen  Completed    Hepatitis A vaccine  Aged Out    Hib vaccine  Aged Out    Meningococcal (ACWY) vaccine  Aged Out    Pneumococcal 0-64 years Vaccine  Aged Out     Family History   Problem Relation Age of Onset    Alzheimer's Disease Mother     Other Mother         bilateral knee replacement    Heart Disease Father     Coronary Art Dis Father     Diabetes Father              Review of Systems  Review of Systems   Constitutional:  Negative for chills, fatigue and fever. Respiratory:  Positive for shortness of breath. Cardiovascular:  Positive for palpitations (awakens her racing). Negative for chest pain.         Deep breaths calms down her pulse rate   Gastrointestinal: Negative for constipation, diarrhea, nausea and vomiting. Genitourinary:  Negative for difficulty urinating, dysuria and hematuria. Neurological:  Positive for light-headedness. Negative for weakness and numbness. Psychiatric/Behavioral:  The patient is nervous/anxious. /72 (Site: Left Upper Arm, Position: Sitting, Cuff Size: Large Adult)   Pulse 56   Resp 18   Ht 5' 7\" (1.702 m)   Wt 225 lb (102.1 kg)   BMI 35.24 kg/m²       Physical Exam    Physical Exam  Constitutional:       General: She is not in acute distress. Appearance: Normal appearance. She is obese. She is not ill-appearing. HENT:      Head: Normocephalic. Neck:      Vascular: No carotid bruit. Cardiovascular:      Rate and Rhythm: Normal rate and regular rhythm. Pulmonary:      Effort: Pulmonary effort is normal.      Breath sounds: Normal breath sounds. No wheezing. Abdominal:      General: Abdomen is flat. Palpations: Abdomen is soft. Tenderness: There is no abdominal tenderness. Musculoskeletal:      Right lower leg: No edema. Left lower leg: No edema. Neurological:      Mental Status: She is alert and oriented to person, place, and time. Deep Tendon Reflexes:      Reflex Scores:       Patellar reflexes are 2+ on the right side and 2+ on the left side.   Psychiatric:         Mood and Affect: Mood normal.         Behavior: Behavior normal.         Assessment & Plan    Current Outpatient Medications   Medication Sig Dispense Refill    escitalopram (LEXAPRO) 5 MG tablet Take 1 tablet by mouth daily 90 tablet 1    lisinopril (PRINIVIL;ZESTRIL) 40 MG tablet Take 1 tablet by mouth daily 90 tablet 3    hydrOXYzine pamoate (VISTARIL) 25 MG capsule TAKE 1 TO 2 CAPSULES BY MOUTH EVERY 6 HOURS AS NEEDED FOR ANXIETY OR SLEEP 60 capsule 2    potassium chloride (KLOR-CON M) 20 MEQ extended release tablet Take 1 tablet by mouth 2 times daily 180 tablet 3    VORTIoxetine HBr (TRINTELLIX) 20 MG TABS tablet Take 1 tablet by mouth daily 90 tablet 3    cyclobenzaprine (FLEXERIL) 10 MG tablet TAKE 1 TABLET BY MOUTH THREE TIMES DAILY AS NEEDED FOR MUSCLE SPASMS 30 tablet 3    butalbital-acetaminophen-caffeine (FIORICET, ESGIC) -40 MG per tablet Take 1 tablet by mouth every 6 hours as needed for Migraine 90 tablet 0    linaclotide (LINZESS) 290 MCG CAPS capsule Take 1 capsule by mouth every morning (before breakfast) 30 capsule 5    sucralfate (CARAFATE) 1 GM tablet Take 1 tablet by mouth 4 times daily 120 tablet 3    dexlansoprazole (DEXILANT) 60 MG CPDR delayed release capsule Take 1 capsule by mouth daily 30 capsule 5    bisacodyl (DULCOLAX) 5 MG EC tablet Take 5 mg by mouth daily as needed for Constipation      metoprolol succinate (TOPROL XL) 25 MG extended release tablet TAKE 1 TABLET BY MOUTH TWICE DAILY 180 tablet 3    clopidogrel (PLAVIX) 75 MG tablet Take 1 tablet by mouth daily 90 tablet 3    rosuvastatin (CRESTOR) 5 MG tablet Take 1 tablet by mouth daily 90 tablet 3    Multiple Vitamin (MULTIVITAMIN ADULT PO) Take by mouth daily      Cholecalciferol (VITAMIN D3) 50 MCG (2000 UT) CAPS Take by mouth daily      zinc 50 MG CAPS Take by mouth daily      vitamin C (ASCORBIC ACID) 500 MG tablet Take 500 mg by mouth daily      NONFORMULARY D - mannose - 1500 mg once daily ( otc) for uti's      Misc Natural Products (GLUCOSAMINE CHOND CMP TRIPLE PO) Take by mouth daily      QUERCETIN PO Take 800 mg by mouth daily      guaiFENesin (MUCINEX) 600 MG extended release tablet Take 1,200 mg by mouth 2 times daily      docusate sodium (COLACE) 100 MG capsule Take 100 mg by mouth daily as needed for Constipation      acetaminophen (TYLENOL) 650 MG extended release tablet Take 2,600 mg by mouth every morning 4 (650 mg) tablets every morning      famotidine (PEPCID) 20 MG tablet Take 20 mg by mouth nightly as needed      cyanocobalamin 1000 MCG/ML injection Inject 1 mL into the muscle every 30 days 10 mL 0    aspirin 81 MG EC  tablet Take 81 mg by mouth daily      LORazepam (ATIVAN) 0.5 MG tablet Take 0.5 mg by mouth 2 times daily as needed. No current facility-administered medications for this visit. Orders Placed This Encounter   Procedures    Magnesium     Standing Status:   Future     Number of Occurrences:   1     Standing Expiration Date:   1/19/2024    Hemoglobin A1C     Standing Status:   Future     Number of Occurrences:   1     Standing Expiration Date:   1/19/2024    EKG 12 Lead     Standing Status:   Future     Number of Occurrences:   1     Standing Expiration Date:   1/19/2024     Order Specific Question:   Reason for Exam?     Answer:   Irregular heart rate       Orders Placed This Encounter   Medications    escitalopram (LEXAPRO) 5 MG tablet     Sig: Take 1 tablet by mouth daily     Dispense:  90 tablet     Refill:  1       Medications Discontinued During This Encounter   Medication Reason    Pseudoephedrine HCl (SUDAFED 12 HOUR PO) LIST CLEANUP        Diagnosis Orders   1. Palpitations  EKG 12 Lead    EKG 12 Lead    Magnesium    Magnesium      2. Severe obesity (BMI 35.0-39. 9) with comorbidity ([address]        3. IFG (impaired fasting glucose)  Hemoglobin A1C    Hemoglobin A1C         Primarily reassurance provided today. Reviewed echo with the patient. Assured her that mildly patent PDA is no cause for concern. Will discuss labs over the phone. Add the above. Follow-up as above or earlier if needed. EKG: trigeminy, outside of this, unchanged from previous tracings. Otherwise, normal intervals. No ST-T changes. Recommended 30 minutes of aerobic exercise at least 4-5 days weekly for physical as well as emotional reasons. Want patient to shed at least 5% of current weight prior to next office visit. Follow up as documented or earlier as needed. Gave her the name of a counselor and urged her to process some of her stress with a therapist.    Return in about 3 months (around 4/19/2023).           Kendy Rice Rushie Schirmer, MD

## 2023-01-20 LAB
EST. AVERAGE GLUCOSE BLD GHB EST-MCNC: 111 MG/DL
HBA1C MFR BLD: 5.5 % (ref 4.8–5.6)
MAGNESIUM SERPL-MCNC: 2.5 MG/DL (ref 1.8–2.4)

## 2023-01-20 NOTE — TELEPHONE ENCOUNTER
From: Lowell Bear  To: Dr. Cevallos Earthly: 1/19/2023 8:45 PM EST  Subject: EKG Results Questions    Thank you for your kindness today! Could you please explain the results of the EKG? What did you say the problem/irregularities were? What causes/caused the irregularities? Can something be done or does anything need to be done to return to regular rhythm? What should I expect in the future?   Thank you

## 2023-02-02 ENCOUNTER — HOSPITAL ENCOUNTER (OUTPATIENT)
Dept: SLEEP CENTER | Age: 63
End: 2023-02-02
Payer: COMMERCIAL

## 2023-02-02 PROCEDURE — 95810 POLYSOM 6/> YRS 4/> PARAM: CPT

## 2023-02-07 ENCOUNTER — NURSE ONLY (OUTPATIENT)
Dept: INTERNAL MEDICINE CLINIC | Facility: CLINIC | Age: 63
End: 2023-02-07

## 2023-02-07 DIAGNOSIS — E83.41 HYPERMAGNESEMIA: ICD-10-CM

## 2023-02-07 LAB — MAGNESIUM SERPL-MCNC: 2.2 MG/DL (ref 1.8–2.4)

## 2023-02-07 RX ORDER — DEXLANSOPRAZOLE 60 MG/1
60 CAPSULE, DELAYED RELEASE ORAL DAILY
Qty: 30 CAPSULE | Refills: 5 | Status: SHIPPED | OUTPATIENT
Start: 2023-02-07

## 2023-02-08 ENCOUNTER — OFFICE VISIT (OUTPATIENT)
Dept: CARDIOLOGY CLINIC | Age: 63
End: 2023-02-08
Payer: COMMERCIAL

## 2023-02-08 VITALS
HEIGHT: 67 IN | SYSTOLIC BLOOD PRESSURE: 130 MMHG | BODY MASS INDEX: 35.47 KG/M2 | HEART RATE: 64 BPM | WEIGHT: 226 LBS | DIASTOLIC BLOOD PRESSURE: 78 MMHG

## 2023-02-08 DIAGNOSIS — I10 ESSENTIAL HYPERTENSION: ICD-10-CM

## 2023-02-08 DIAGNOSIS — R00.2 PALPITATIONS: Primary | ICD-10-CM

## 2023-02-08 DIAGNOSIS — E78.2 MIXED HYPERLIPIDEMIA: ICD-10-CM

## 2023-02-08 DIAGNOSIS — I25.10 CORONARY ARTERY DISEASE INVOLVING NATIVE HEART WITHOUT ANGINA PECTORIS, UNSPECIFIED VESSEL OR LESION TYPE: ICD-10-CM

## 2023-02-08 PROCEDURE — 99214 OFFICE O/P EST MOD 30 MIN: CPT | Performed by: INTERNAL MEDICINE

## 2023-02-08 PROCEDURE — 3075F SYST BP GE 130 - 139MM HG: CPT | Performed by: INTERNAL MEDICINE

## 2023-02-08 PROCEDURE — 3078F DIAST BP <80 MM HG: CPT | Performed by: INTERNAL MEDICINE

## 2023-02-08 NOTE — PROGRESS NOTES
[address], [address] [place] [address], [address] [place] [address]  PHONE: [phone]    SUBJECTIVE: Kendy Garcia (1960) is a 58 y.o. female seen for a follow up visit regarding the following:   Specialty Problems          Cardiology Problems    Patent ductus arteriosus        CAD (coronary artery disease)        Hypertension        Atherosclerosis of coronary artery        Hyperlipidemia        Migraine without aura and without status migrainosus, not intractable        Internal hemorrhoids         Pt is here for increasing irregular heart rate and high BP at home, 459-177 systolic. States she also wakes up in the middle of the night 3-4x per night with heart heart racing, has been an intermittent chronic problem. Typically sees Dr. Valenzuela Shown for palpitations for which she is on chronic BB therapy. Hx of hyperlipidemia, HTN and CAD which she takes lisinopril and crestor for. History of anxiety and depression. BP stable at 132/72. EKG in October showed normal sinus rhythm. Did have a heart attack in 2007, and has had some anxiety regarding cardiac health since. Denies chest pain, shortness of breath, leg swelling. 2/8/23  Pt doing well. No chest pain. No palpitations. Patient denies syncope. No dyspnea. States they are taking meds. Maintains a normal level of activity for them without symptoms. No dizziness or lightheadedness. All above conditions stable. Blood pressure seems to be much better controlled on the increased dose of her lisinopril    Recent echocardiogram is normal ejection fraction is normal diastolic function is reported out as normal.  Echo reads out a patent ductus arteriosus but I see no hemodynamic evidence of this I have reviewed the actual images and do not see a PDA. She has occasional PVCs these again are benign.   We are waiting for sleep study results    Past Medical History, Past Surgical History, Family history, Social History, and Medications were all reviewed with the patient today and updated as necessary. Allergies   Allergen Reactions    Latex Rash    Heparin (Bovine) Anaphylaxis     Past Medical History:   Diagnosis Date    Anxiety and depression 12/27/2018    Arthritis of left knee 12/2/2020    Autoimmune disease ([facility] [address]     fibromyalgia    CAD (coronary artery disease) 2006    [place]    Chest pain     Coagulation defects     hx of dic ?  pe    GERD (gastroesophageal reflux disease)     Hypertension     Palpitations 8/22/2016    Aug 2014 - K+ 3.4, Mg 2.1 7 day monitor - single 8 beat run of atrial ectopy, asymptomatic Feb 2016 - 7 day monitor - normal tracing, all symptoms with NSR    PUD (peptic ulcer disease)     Thromboembolus ([facility] [address]     lle and pe's--had had foot surgery; just one time     Past Surgical History:   Procedure Laterality Date    CARDIAC CATHETERIZATION      CHOLECYSTECTOMY  2002    HEENT      septoplasty,rhinoplasty    [place] UNLISTED PROCEDURE CARDIAC SURGERY      cath stents     Family History   Problem Relation Age of Onset    Alzheimer's Disease Mother     Other Mother         bilateral knee replacement    Heart Disease Father     Coronary Art Dis Father     Diabetes Father       Social History     Tobacco Use    Smoking status: Never    Smokeless tobacco: Never   Substance Use Topics    Alcohol use: No       Current Outpatient Medications:     dexlansoprazole (DEXILANT) 60 MG CPDR delayed release capsule, Take 1 capsule by mouth daily, Disp: 30 capsule, Rfl: 5    escitalopram (LEXAPRO) 5 MG tablet, Take 1 tablet by mouth daily, Disp: 90 tablet, Rfl: 1    lisinopril (PRINIVIL;ZESTRIL) 40 MG tablet, Take 1 tablet by mouth daily, Disp: 90 tablet, Rfl: 3    hydrOXYzine pamoate (VISTARIL) 25 MG capsule, TAKE 1 TO 2 CAPSULES BY MOUTH EVERY 6 HOURS AS NEEDED FOR ANXIETY OR SLEEP, Disp: 60 capsule, Rfl: 2    potassium chloride (KLOR-CON M) 20 MEQ extended release tablet, Take 1 tablet by mouth 2 times daily, Disp: 180 tablet, Rfl: 3    VORTIoxetine HBr (TRINTELLIX) 20 MG TABS tablet, Take 1 tablet by mouth daily, Disp: 90 tablet, Rfl: 3    cyclobenzaprine (FLEXERIL) 10 MG tablet, TAKE 1 TABLET BY MOUTH THREE TIMES DAILY AS NEEDED FOR MUSCLE SPASMS, Disp: 30 tablet, Rfl: 3    butalbital-acetaminophen-caffeine (FIORICET, ESGIC) -40 MG per tablet, Take 1 tablet by mouth every 6 hours as needed for Migraine, Disp: 90 tablet, Rfl: 0    linaclotide (LINZESS) 290 MCG CAPS capsule, Take 1 capsule by mouth every morning (before breakfast), Disp: 30 capsule, Rfl: 5    sucralfate (CARAFATE) 1 GM tablet, Take 1 tablet by mouth 4 times daily, Disp: 120 tablet, Rfl: 3    bisacodyl (DULCOLAX) 5 MG EC tablet, Take 5 mg by mouth daily as needed for Constipation, Disp: , Rfl:     metoprolol succinate (TOPROL XL) 25 MG extended release tablet, TAKE 1 TABLET BY MOUTH TWICE DAILY, Disp: 180 tablet, Rfl: 3    clopidogrel (PLAVIX) 75 MG tablet, Take 1 tablet by mouth daily, Disp: 90 tablet, Rfl: 3    rosuvastatin (CRESTOR) 5 MG tablet, Take 1 tablet by mouth daily, Disp: 90 tablet, Rfl: 3    Multiple Vitamin (MULTIVITAMIN ADULT PO), Take by mouth daily, Disp: , Rfl:     Cholecalciferol (VITAMIN D3) 50 MCG (2000 UT) CAPS, Take by mouth daily, Disp: , Rfl:     zinc 50 MG CAPS, Take by mouth daily, Disp: , Rfl:     vitamin C (ASCORBIC ACID) 500 MG tablet, Take 500 mg by mouth daily, Disp: , Rfl:     NONFORMULARY, D - mannose - 1500 mg once daily ( otc) for uti's, Disp: , Rfl:     Misc Natural Products (GLUCOSAMINE CHOND CMP TRIPLE PO), Take by mouth daily, Disp: , Rfl:     QUERCETIN PO, Take 800 mg by mouth daily, Disp: , Rfl:     guaiFENesin (MUCINEX) 600 MG extended release tablet, Take 1,200 mg by mouth 2 times daily, Disp: , Rfl:     docusate sodium (COLACE) 100 MG capsule, Take 100 mg by mouth daily as needed for Constipation, Disp: , Rfl:     acetaminophen (TYLENOL) 650 MG extended release tablet, Take 2,600 mg by mouth every morning 4 (650 mg) tablets every morning, Disp: , Rfl:     famotidine (PEPCID) 20 MG tablet, Take 20 mg by mouth nightly as needed, Disp: , Rfl:     cyanocobalamin 1000 MCG/ML injection, Inject 1 mL into the muscle every 30 days, Disp: 10 mL, Rfl: 0    aspirin 81 MG EC tablet, Take 81 mg by mouth daily, Disp: , Rfl:     LORazepam (ATIVAN) 0.5 MG tablet, Take 0.5 mg by mouth 2 times daily as needed. , Disp: , Rfl:     ROS:  Review of Systems - General ROS: negative for - chills, fatigue or fever  Hematological and Lymphatic ROS: negative for - bleeding problems, bruising or jaundice  Respiratory ROS: no cough, shortness of breath, or wheezing  Cardiovascular ROS: no chest pain or dyspnea on exertion  positive for - tachycardia when waking up from sleep  Gastrointestinal ROS: no abdominal pain, change in bowel habits, or black or bloody stools  Neurological ROS: no TIA or stroke symptoms  All other systems negative.     Wt Readings from Last 3 Encounters:   02/08/23 226 lb (102.5 kg)   01/19/23 225 lb (102.1 kg)   01/16/23 224 lb (101.6 kg)     Temp Readings from Last 3 Encounters:   No data found for Temp     BP Readings from Last 3 Encounters:   02/08/23 130/78   01/19/23 127/72   01/16/23 (!) 142/70     Pulse Readings from Last 3 Encounters:   02/08/23 64   01/19/23 56   01/16/23 72       PHYSICAL EXAM:  /78   Pulse 64   Ht 5' 7\" (1.702 m)   Wt 226 lb (102.5 kg)   BMI 35.40 kg/m²   Physical Examination: General appearance - alert, well appearing, and in no distress  Mental status - alert, oriented to person, place, and time  Eyes - pupils equal and reactive, extraocular eye movements intact  Neck/lymph - supple, no significant adenopathy  Chest/lungs - clear to auscultation, no wheezes, rales or rhonchi, symmetric air entry  Heart/CV - normal rate, regular rhythm, normal S1, S2, no murmurs, rubs, clicks or gallops  Abdomen/GI - soft, nontender, nondistended, no masses or organomegaly  Musculoskeletal - no joint tenderness, deformity or swelling  Extremities - peripheral pulses normal, no pedal edema, no clubbing or cyanosis  Skin - normal coloration and turgor, no rashes, no suspicious skin lesions noted    EKG: normal EKG, normal sinus rhythm.          Medications reviewed and questions answered    Recent Results (from the past 672 hour(s))   T4, Free    Collection Time: 01/13/23 11:50 AM   Result Value Ref Range    T4 Free 0.8 0.78 - 1.46 NG/DL   TSH    Collection Time: 01/13/23 11:50 AM   Result Value Ref Range    TSH, 3RD GENERATION 0.985 0.358 - 3.740 uIU/mL   Vitamin D 25 Hydroxy    Collection Time: 01/13/23 11:50 AM   Result Value Ref Range    Vit D, 25-Hydroxy 41.0 30.0 - 100.0 ng/mL   Iron    Collection Time: 01/13/23 11:50 AM   Result Value Ref Range    Iron 128 35 - 150 ug/dL   Lipid Panel    Collection Time: 01/13/23 11:50 AM   Result Value Ref Range    Cholesterol, Total 122 <200 MG/DL    Triglycerides 138 35 - 150 MG/DL    HDL 48 40 - 60 MG/DL    LDL Calculated 46.4 <100 MG/DL    VLDL Cholesterol Calculated 27.6 (H) 6.0 - 23.0 MG/DL    Chol/HDL Ratio 2.5     Comprehensive Metabolic Panel    Collection Time: 01/13/23 11:50 AM   Result Value Ref Range    Sodium 142 133 - 143 mmol/L    Potassium 4.4 3.5 - 5.1 mmol/L    Chloride 108 101 - 110 mmol/L    CO2 29 21 - 32 mmol/L    Anion Gap 5 2 - 11 mmol/L    Glucose 93 65 - 100 mg/dL    BUN 11 8 - 23 MG/DL    Creatinine 0.80 0.6 - 1.0 MG/DL    Est, Glom Filt Rate >60 >60 ml/min/1.73m2    Calcium 9.9 8.3 - 10.4 MG/DL    Total Bilirubin 0.8 0.2 - 1.1 MG/DL    ALT 29 12 - 65 U/L    AST 14 (L) 15 - 37 U/L    Alk Phosphatase 87 50 - 136 U/L    Total Protein 7.0 6.3 - 8.2 g/dL    Albumin 4.2 3.2 - 4.6 g/dL    Globulin 2.8 2.8 - 4.5 g/dL    Albumin/Globulin Ratio 1.5 0.4 - 1.6     CBC with Auto Differential    Collection Time: 01/13/23 11:50 AM   Result Value Ref Range    WBC 6.9 4.3 - 11.1 K/uL    RBC 4.82 4.05 - 5.2 M/uL    Hemoglobin 14.7 11.7 - 15.4 g/dL    Hematocrit 44.0 35.8 - 46.3 %    MCV 91.3 82 - 102 FL    MCH 30.5 26.1 - 32.9 PG    MCHC 33.4 31.4 - 35.0 g/dL    RDW 13.5 11.9 - 14.6 %    Platelets 073 017 - 988 K/uL    MPV 9.8 9.4 - 12.3 FL    nRBC 0.00 0.0 - 0.2 K/uL    Differential Type AUTOMATED      Seg Neutrophils 62 43 - 78 %    Lymphocytes 26 13 - 44 %    Monocytes 7 4.0 - 12.0 %    Eosinophils % 4 0.5 - 7.8 %    Basophils 1 0.0 - 2.0 %    Immature Granulocytes 0 0.0 - 5.0 %    Segs Absolute 4.3 1.7 - 8.2 K/UL    Absolute Lymph # 1.8 0.5 - 4.6 K/UL    Absolute Mono # 0.5 0.1 - 1.3 K/UL    Absolute Eos # 0.2 0.0 - 0.8 K/UL    Basophils Absolute 0.1 0.0 - 0.2 K/UL    Absolute Immature Granulocyte 0.0 0.0 - 0.5 K/UL   Transthoracic echocardiogram (TTE) complete with contrast, bubble, strain, and 3D PRN    Collection Time: 01/16/23 11:26 AM   Result Value Ref Range    LV ESV A4C 25 mL    LV EDV A4C 75 mL    LV ESV A2C 25 mL    LV EDV A2C 65 mL    LV E' Septal Velocity 7 cm/s    LVOT Peak Velocity 1.2 m/s    LV E' Lateral Velocity 8 cm/s    LVPWd 1.0 0.6 - 0.9 cm    LVOT Peak Gradient 6 mmHg    IVSd 1.0 0.6 - 0.9 cm    LVIDs 3.3 cm    LVIDd 4.9 3.9 - 5.3 cm    EF BP 64 55 - 100 %    LV Ejection Fraction A2C 62 %    LV Ejection Fraction A4C 66 %    RVIDd 2.5 cm    LA Diameter 4.1 cm    AV Peak Gradient 7 mmHg    AV Peak Velocity 1.4 m/s    MV E Velocity 0.68 m/s    MV E Wave Deceleration Time 292.0 ms    MV A Velocity 0.62 m/s    Ascending Aorta 3.1 cm    Aortic Root 3.0 cm    Body Surface Area 2.19 m2    Fractional Shortening 2D 33 28 - 44 %    LV ESV Index A4C 12 mL/m2    LV EDV Index A4C 35 mL/m2    LV ESV Index A2C 12 mL/m2    LV EDV Index A2C 31 mL/m2    LVIDd Index 2.31 cm/m2    LVIDs Index 1.56 cm/m2    LV RWT Ratio 0.41     LV Mass 2D 176.0 (A) 67 - 162 g    LV Mass 2D Index 83.0 43 - 95 g/m2    MV E/A 1.10     E/E' Ratio (Averaged) 9.11     E/E' Lateral 8.50     E/E' Septal 9.71     LA Size Index 1.93 cm/m2    LA/AO Root Ratio 1.37     Ao Root Index 1.42 cm/m2    Ascending Aorta Index 1.46 cm/m2    AV Velocity Ratio 0.86     LA Volume BP 42 22 - 52 mL    LA Volume Index BP 20 16 - 34 ml/m2    RV Basal Dimension 3.9 cm    RV Mid Dimension 2.4 cm    TAPSE 2.4 1.7 cm    Left Ventricular Ejection Fraction 64     LVEF MODALITY ECHO    Hemoglobin A1C    Collection Time: 01/19/23 10:24 AM   Result Value Ref Range    Hemoglobin A1C 5.5 4.8 - 5.6 %    eAG 111 mg/dL   Magnesium    Collection Time: 01/19/23 10:24 AM   Result Value Ref Range    Magnesium 2.5 (H) 1.8 - 2.4 mg/dL   Magnesium    Collection Time: 02/07/23  8:32 AM   Result Value Ref Range    Magnesium 2.2 1.8 - 2.4 mg/dL     Lab Results   Component Value Date/Time    CHOL 122 01/13/2023 11:50 AM    HDL 48 01/13/2023 11:50 AM    VLDL 24 11/24/2021 11:34 AM       ASSESSMENT and PLAN  Problem List Items Addressed This Visit          Circulatory    Palpitations - Primary    CAD (coronary artery disease)       Other    Hyperlipidemia     Other Visit Diagnoses       Essential hypertension              1/13/2023  Mild concern for cardiac issues  -Stress Management   -Cardiac monitor on phone and track rhythm during episodes that are concerning.   -Follow up with Dr. Valenzuela Shown in 3 months. Will increase lisinopril from 20 to 40 mg a day to combat a recent trend of increase in blood pressures.   I have recommended an apple watch or other smart device to monitor rhythms when she is noticing that they are abnormal and have demonstrated how to use a watch    Have reordered labs    Have recommended a sleep study for increasing incidence of nocturnal palpitations and increasingly difficult to control blood pressure these are often early subtle signs of obstructive sleep apnea and she may benefit from a sleep study    Overall patient is doing better feeling better blood pressure is better controlled echo is normal LV function we are awaiting the results of sleep study but patient overall is doing better      Continue meds as below    Current Outpatient Medications:     dexlansoprazole (DEXILANT) 60 MG CPDR delayed release capsule, Take 1 capsule by mouth daily, Disp: 30 capsule, Rfl: 5    escitalopram (LEXAPRO) 5 MG tablet, Take 1 tablet by mouth daily, Disp: 90 tablet, Rfl: 1    lisinopril (PRINIVIL;ZESTRIL) 40 MG tablet, Take 1 tablet by mouth daily, Disp: 90 tablet, Rfl: 3    hydrOXYzine pamoate (VISTARIL) 25 MG capsule, TAKE 1 TO 2 CAPSULES BY MOUTH EVERY 6 HOURS AS NEEDED FOR ANXIETY OR SLEEP, Disp: 60 capsule, Rfl: 2    potassium chloride (KLOR-CON M) 20 MEQ extended release tablet, Take 1 tablet by mouth 2 times daily, Disp: 180 tablet, Rfl: 3    VORTIoxetine HBr (TRINTELLIX) 20 MG TABS tablet, Take 1 tablet by mouth daily, Disp: 90 tablet, Rfl: 3    cyclobenzaprine (FLEXERIL) 10 MG tablet, TAKE 1 TABLET BY MOUTH THREE TIMES DAILY AS NEEDED FOR MUSCLE SPASMS, Disp: 30 tablet, Rfl: 3    butalbital-acetaminophen-caffeine (FIORICET, ESGIC) -40 MG per tablet, Take 1 tablet by mouth every 6 hours as needed for Migraine, Disp: 90 tablet, Rfl: 0    linaclotide (LINZESS) 290 MCG CAPS capsule, Take 1 capsule by mouth every morning (before breakfast), Disp: 30 capsule, Rfl: 5    sucralfate (CARAFATE) 1 GM tablet, Take 1 tablet by mouth 4 times daily, Disp: 120 tablet, Rfl: 3    bisacodyl (DULCOLAX) 5 MG EC tablet, Take 5 mg by mouth daily as needed for Constipation, Disp: , Rfl:     metoprolol succinate (TOPROL XL) 25 MG extended release tablet, TAKE 1 TABLET BY MOUTH TWICE DAILY, Disp: 180 tablet, Rfl: 3    clopidogrel (PLAVIX) 75 MG tablet, Take 1 tablet by mouth daily, Disp: 90 tablet, Rfl: 3    rosuvastatin (CRESTOR) 5 MG tablet, Take 1 tablet by mouth daily, Disp: 90 tablet, Rfl: 3    Multiple Vitamin (MULTIVITAMIN ADULT PO), Take by mouth daily, Disp: , Rfl:     Cholecalciferol (VITAMIN D3) 50 MCG (2000 UT) CAPS, Take by mouth daily, Disp: , Rfl:     zinc 50 MG CAPS, Take by mouth daily, Disp: , Rfl:     vitamin C (ASCORBIC ACID) 500 MG tablet, Take 500 mg by mouth daily, Disp: , Rfl:     NONFORMULARY, D - mannose - 1500 mg once daily ( otc) for uti's, Disp: , Rfl:     Misc Natural Products (GLUCOSAMINE CHOND CMP TRIPLE PO), Take by mouth daily, Disp: , Rfl:     QUERCETIN PO, Take 800 mg by mouth daily, Disp: , Rfl:     guaiFENesin (MUCINEX) 600 MG extended release tablet, Take 1,200 mg by mouth 2 times daily, Disp: , Rfl:     docusate sodium (COLACE) 100 MG capsule, Take 100 mg by mouth daily as needed for Constipation, Disp: , Rfl:     acetaminophen (TYLENOL) 650 MG extended release tablet, Take 2,600 mg by mouth every morning 4 (650 mg) tablets every morning, Disp: , Rfl:     famotidine (PEPCID) 20 MG tablet, Take 20 mg by mouth nightly as needed, Disp: , Rfl:     cyanocobalamin 1000 MCG/ML injection, Inject 1 mL into the muscle every 30 days, Disp: 10 mL, Rfl: 0    aspirin 81 MG EC tablet, Take 81 mg by mouth daily, Disp: , Rfl:     LORazepam (ATIVAN) 0.5 MG tablet, Take 0.5 mg by mouth 2 times daily as needed. , Disp: , Rfl:     Opal Morris MD  2/8/2023  1:24 PM    Pt is instructed to follow all appropriate cardiac risk factor recommendations and to be compliant with meds and testing. Instructed to follow up appropriately and seek urgent medical care if acute or unstable issues arise. Results of some tests may be viewed thru [address] but this does not substitute for follow up with MD. If follow up is not scheduled pt is instructed to call for follow up    I have personally seen and evaluated the patient and reviewed the students note and agree with the following assessment and plan and findings. I was present for and observed the key components of this note. Any appropriate additions or editing of the information have been done by me.     Roxann Marino MD, [address] [facility]  Cardiology

## 2023-02-23 ENCOUNTER — TELEPHONE (OUTPATIENT)
Dept: SLEEP MEDICINE | Age: 63
End: 2023-02-23

## 2023-02-24 ENCOUNTER — TELEPHONE (OUTPATIENT)
Dept: SLEEP MEDICINE | Age: 63
End: 2023-02-24

## 2023-02-27 ENCOUNTER — E-VISIT (OUTPATIENT)
Dept: INTERNAL MEDICINE CLINIC | Facility: CLINIC | Age: 63
End: 2023-02-27

## 2023-02-27 DIAGNOSIS — J40 BRONCHITIS: ICD-10-CM

## 2023-02-27 PROCEDURE — 99421 OL DIG E/M SVC 5-10 MIN: CPT | Performed by: INTERNAL MEDICINE

## 2023-02-27 RX ORDER — PREDNISONE 10 MG/1
TABLET ORAL
Qty: 21 EACH | Refills: 0 | Status: SHIPPED | OUTPATIENT
Start: 2023-02-27

## 2023-02-27 RX ORDER — AZITHROMYCIN 250 MG/1
250 TABLET, FILM COATED ORAL SEE ADMIN INSTRUCTIONS
Qty: 6 TABLET | Refills: 0 | Status: SHIPPED | OUTPATIENT
Start: 2023-02-27 | End: 2023-03-04

## 2023-02-27 ASSESSMENT — LIFESTYLE VARIABLES: SMOKING_STATUS: NO, I'VE NEVER SMOKED

## 2023-02-27 NOTE — PROGRESS NOTES
Staci Arambual (1960) initiated an asynchronous digital communication through [address] [place] [address]. HPI: per patient questionnaire     Exam: not applicable    Diagnoses and all orders for this visit:  Diagnoses and all orders for this visit:    Bronchitis  -     azithromycin (ZITHROMAX) 250 MG tablet; Take 1 tablet by mouth See Admin Instructions for 5 days 500mg on day 1 followed by 250mg on days 2 - 5    Other orders  -     predniSONE 10 MG (21) TBPK; Take as directed          Time: EV1 - 5-10 minutes were spent on the digital evaluation and management of this patient.     Jovanna Faustin MD

## 2023-02-28 ENCOUNTER — TELEPHONE (OUTPATIENT)
Dept: SLEEP MEDICINE | Age: 63
End: 2023-02-28

## 2023-03-01 ENCOUNTER — TELEPHONE (OUTPATIENT)
Dept: INTERNAL MEDICINE CLINIC | Facility: CLINIC | Age: 63
End: 2023-03-01

## 2023-03-01 NOTE — TELEPHONE ENCOUNTER
----- Message from Andrzej Francisco sent at 3/1/2023  2:31 PM EST -----  Regarding: Need prior authorization please.  Thanks a bunch  I meant to say the prior authorization hasnt been sent to my ins coJulius Zaidi still cant fill my script

## 2023-03-02 NOTE — TELEPHONE ENCOUNTER
Submitted a PA online and medication was approved. I called the pharmacy and the medication would still not go through. I called pt's insurance @ [phone]. Spoke with a representative and the medication was going through on her side as well. She ran a claim but it was still requiring an over ride. Insurance states the pharmacy will have to call the help desk @ [phone] for an over ride. Called pharmacy back and gave the info above, they were finally able to get the mediation to go through for a $10 co pay    Pt notified by voicemail.

## 2023-03-29 RX ORDER — LINACLOTIDE 290 UG/1
CAPSULE, GELATIN COATED ORAL
Qty: 30 CAPSULE | Refills: 5 | Status: SHIPPED | OUTPATIENT
Start: 2023-03-29

## 2023-04-06 RX ORDER — LISINOPRIL 20 MG/1
40 TABLET ORAL DAILY
Qty: 180 TABLET | Refills: 0 | Status: SHIPPED | OUTPATIENT
Start: 2023-04-06

## 2023-04-06 NOTE — TELEPHONE ENCOUNTER
Requested Prescriptions     Pending Prescriptions Disp Refills    lisinopril (PRINIVIL;ZESTRIL) 20 MG tablet 180 tablet 0     Sig: Take 2 tablets by mouth daily

## 2023-04-06 NOTE — TELEPHONE ENCOUNTER
MEDICATION REFILL REQUEST      Name of Medication:  Lisinopril  Dose:  20 mg   Frequency:  twice a day  Quantity:  180  Days' supply:  80 with 3 refills      Pharmacy Name/Location:  [facility] is calling saying Dr Boateng Husbands changed her med and called it in for 40 mg 1 a day ,the pt said she spoke to him regarding this and they decided she would take 20 mg twice a day but it was still called in wrong   Please  call this in asap

## 2023-05-05 DIAGNOSIS — F41.1 GENERALIZED ANXIETY DISORDER: Primary | ICD-10-CM

## 2023-05-05 RX ORDER — LISINOPRIL 20 MG/1
40 TABLET ORAL DAILY
Qty: 180 TABLET | Refills: 0 | Status: SHIPPED | OUTPATIENT
Start: 2023-05-05 | End: 2023-06-30 | Stop reason: SDUPTHER

## 2023-05-05 RX ORDER — DEXLANSOPRAZOLE 60 MG/1
60 CAPSULE, DELAYED RELEASE ORAL DAILY
Qty: 30 CAPSULE | Refills: 5 | Status: CANCELLED | OUTPATIENT
Start: 2023-05-05

## 2023-05-05 RX ORDER — BUTALBITAL, ACETAMINOPHEN AND CAFFEINE 50; 325; 40 MG/1; MG/1; MG/1
1 TABLET ORAL EVERY 6 HOURS PRN
Qty: 90 TABLET | Refills: 0 | Status: SHIPPED | OUTPATIENT
Start: 2023-05-05

## 2023-05-05 RX ORDER — CYCLOBENZAPRINE HCL 10 MG
TABLET ORAL
Qty: 30 TABLET | Refills: 3 | Status: SHIPPED | OUTPATIENT
Start: 2023-05-05

## 2023-05-05 RX ORDER — LORAZEPAM 0.5 MG/1
0.5 TABLET ORAL 2 TIMES DAILY PRN
Qty: 30 TABLET | Refills: 2 | Status: SHIPPED | OUTPATIENT
Start: 2023-05-05 | End: 2023-08-03

## 2023-05-15 ENCOUNTER — OFFICE VISIT (OUTPATIENT)
Age: 63
End: 2023-05-15
Payer: COMMERCIAL

## 2023-05-15 VITALS
HEART RATE: 84 BPM | HEIGHT: 67 IN | WEIGHT: 226 LBS | SYSTOLIC BLOOD PRESSURE: 106 MMHG | DIASTOLIC BLOOD PRESSURE: 82 MMHG | BODY MASS INDEX: 35.47 KG/M2

## 2023-05-15 DIAGNOSIS — I10 ESSENTIAL HYPERTENSION: ICD-10-CM

## 2023-05-15 DIAGNOSIS — E78.2 MIXED HYPERLIPIDEMIA: ICD-10-CM

## 2023-05-15 DIAGNOSIS — R00.2 PALPITATIONS: Primary | ICD-10-CM

## 2023-05-15 DIAGNOSIS — F41.1 GENERALIZED ANXIETY DISORDER: ICD-10-CM

## 2023-05-15 DIAGNOSIS — I25.10 CORONARY ARTERY DISEASE INVOLVING NATIVE HEART WITHOUT ANGINA PECTORIS, UNSPECIFIED VESSEL OR LESION TYPE: ICD-10-CM

## 2023-05-15 PROCEDURE — 3079F DIAST BP 80-89 MM HG: CPT | Performed by: INTERNAL MEDICINE

## 2023-05-15 PROCEDURE — 99214 OFFICE O/P EST MOD 30 MIN: CPT | Performed by: INTERNAL MEDICINE

## 2023-05-15 PROCEDURE — 3074F SYST BP LT 130 MM HG: CPT | Performed by: INTERNAL MEDICINE

## 2023-05-15 NOTE — PROGRESS NOTES
[address], [address] [place] [address], [address] [place] [address]  PHONE: [phone]    SUBJECTIVE: Allara Amira  Giovanni Reese (1960) is a 58 y.o. female seen for a follow up visit regarding the following:   Specialty Problems          Cardiology Problems    Patent ductus arteriosus        CAD (coronary artery disease)        Hypertension        Atherosclerosis of coronary artery        Hyperlipidemia        Migraine without aura and without status migrainosus, not intractable        Internal hemorrhoids         Pt is here for increasing irregular heart rate and high BP at home, 111-917 systolic. States she also wakes up in the middle of the night 3-4x per night with heart heart racing, has been an intermittent chronic problem. Typically sees Dr. Calista Santos for palpitations for which she is on chronic BB therapy. Hx of hyperlipidemia, HTN and CAD which she takes lisinopril and crestor for. History of anxiety and depression. BP stable at 132/72. EKG in October showed normal sinus rhythm. Did have a heart attack in 2007, and has had some anxiety regarding cardiac health since. Denies chest pain, shortness of breath, leg swelling. 2/8/23  Pt doing well. No chest pain. No palpitations. Patient denies syncope. No dyspnea. States they are taking meds. Maintains a normal level of activity for them without symptoms. No dizziness or lightheadedness. All above conditions stable. Blood pressure seems to be much better controlled on the increased dose of her lisinopril    Recent echocardiogram is normal ejection fraction is normal diastolic function is reported out as normal.  Echo reads out a patent ductus arteriosus but I see no hemodynamic evidence of this I have reviewed the actual images and do not see a PDA. She has occasional PVCs these again are benign. We are waiting for sleep study results    5/15/23  Pt doing well. No chest pain. No palpitations. Patient denies syncope. No dyspnea.  States they are

## 2023-06-02 ENCOUNTER — E-VISIT (OUTPATIENT)
Dept: INTERNAL MEDICINE CLINIC | Facility: CLINIC | Age: 63
End: 2023-06-02
Payer: COMMERCIAL

## 2023-06-02 DIAGNOSIS — J40 BRONCHITIS: Primary | ICD-10-CM

## 2023-06-02 PROCEDURE — 99421 OL DIG E/M SVC 5-10 MIN: CPT | Performed by: INTERNAL MEDICINE

## 2023-06-02 ASSESSMENT — LIFESTYLE VARIABLES: SMOKING_STATUS: NO, I'VE NEVER SMOKED

## 2023-06-03 ENCOUNTER — PATIENT MESSAGE (OUTPATIENT)
Dept: INTERNAL MEDICINE CLINIC | Facility: CLINIC | Age: 63
End: 2023-06-03

## 2023-06-04 RX ORDER — PREDNISONE 10 MG/1
TABLET ORAL
Qty: 21 EACH | Refills: 0 | Status: SHIPPED | OUTPATIENT
Start: 2023-06-04

## 2023-06-04 RX ORDER — AZITHROMYCIN 250 MG/1
250 TABLET, FILM COATED ORAL SEE ADMIN INSTRUCTIONS
Qty: 6 TABLET | Refills: 0 | Status: SHIPPED | OUTPATIENT
Start: 2023-06-04 | End: 2023-06-09

## 2023-06-05 NOTE — PROGRESS NOTES
DO Deborah Lovelace called stating he was seen at [facility] 11/15/2022 and is still having Nausea, Abdominal cramps, Head Congestion, and scratchy throat. I was unable to schedule patient for an appointment this week due to there are no available openings with any of our Providers. Please Advise. Jimena Watson (1960) initiated an asynchronous digital communication through [address] [place] [address]. HPI: per patient questionnaire     Exam: not applicable    Diagnoses and all orders for this visit:  Diagnoses and all orders for this visit:    Bronchitis    Other orders  -     azithromycin (ZITHROMAX) 250 MG tablet; Take 1 tablet by mouth See Admin Instructions for 5 days 500mg on day 1 followed by 250mg on days 2 - 5  -     predniSONE 10 MG (21) TBPK; Take as directed          Time: EV1 - 5-10 minutes were spent on the digital evaluation and management of this patient.     Lisset Keita MD

## 2023-06-29 RX ORDER — ESCITALOPRAM OXALATE 5 MG/1
5 TABLET ORAL DAILY
Qty: 90 TABLET | Refills: 1 | OUTPATIENT
Start: 2023-06-29

## 2023-06-29 RX ORDER — ESCITALOPRAM OXALATE 5 MG/1
5 TABLET ORAL DAILY
Qty: 90 TABLET | Refills: 1 | Status: SHIPPED | OUTPATIENT
Start: 2023-06-29

## 2023-06-30 ENCOUNTER — TELEPHONE (OUTPATIENT)
Dept: INTERNAL MEDICINE CLINIC | Facility: CLINIC | Age: 63
End: 2023-06-30

## 2023-06-30 RX ORDER — LISINOPRIL 20 MG/1
40 TABLET ORAL DAILY
Qty: 180 TABLET | Refills: 3 | Status: SHIPPED | OUTPATIENT
Start: 2023-06-30

## 2023-07-12 ENCOUNTER — OFFICE VISIT (OUTPATIENT)
Dept: INTERNAL MEDICINE CLINIC | Facility: CLINIC | Age: 63
End: 2023-07-12
Payer: COMMERCIAL

## 2023-07-12 VITALS
WEIGHT: 230 LBS | RESPIRATION RATE: 17 BRPM | HEIGHT: 67 IN | TEMPERATURE: 97.4 F | OXYGEN SATURATION: 99 % | HEART RATE: 78 BPM | BODY MASS INDEX: 36.1 KG/M2 | DIASTOLIC BLOOD PRESSURE: 67 MMHG | SYSTOLIC BLOOD PRESSURE: 123 MMHG

## 2023-07-12 DIAGNOSIS — Z79.899 ENCOUNTER FOR LONG-TERM (CURRENT) USE OF MEDICATIONS: ICD-10-CM

## 2023-07-12 DIAGNOSIS — Z13.820 ENCOUNTER FOR SCREENING FOR OSTEOPOROSIS: ICD-10-CM

## 2023-07-12 DIAGNOSIS — I10 PRIMARY HYPERTENSION: Primary | ICD-10-CM

## 2023-07-12 DIAGNOSIS — R73.01 IFG (IMPAIRED FASTING GLUCOSE): ICD-10-CM

## 2023-07-12 DIAGNOSIS — E78.2 MIXED HYPERLIPIDEMIA: ICD-10-CM

## 2023-07-12 DIAGNOSIS — Z78.0 POST-MENOPAUSAL: ICD-10-CM

## 2023-07-12 DIAGNOSIS — J32.9 RECURRENT SINUS INFECTIONS: ICD-10-CM

## 2023-07-12 PROCEDURE — 3078F DIAST BP <80 MM HG: CPT | Performed by: INTERNAL MEDICINE

## 2023-07-12 PROCEDURE — 99214 OFFICE O/P EST MOD 30 MIN: CPT | Performed by: INTERNAL MEDICINE

## 2023-07-12 PROCEDURE — 3074F SYST BP LT 130 MM HG: CPT | Performed by: INTERNAL MEDICINE

## 2023-07-12 RX ORDER — LISINOPRIL 30 MG/1
30 TABLET ORAL DAILY
Qty: 90 TABLET | Refills: 1 | Status: SHIPPED | OUTPATIENT
Start: 2023-07-12

## 2023-07-12 SDOH — ECONOMIC STABILITY: FOOD INSECURITY: WITHIN THE PAST 12 MONTHS, THE FOOD YOU BOUGHT JUST DIDN'T LAST AND YOU DIDN'T HAVE MONEY TO GET MORE.: NEVER TRUE

## 2023-07-12 SDOH — ECONOMIC STABILITY: INCOME INSECURITY: HOW HARD IS IT FOR YOU TO PAY FOR THE VERY BASICS LIKE FOOD, HOUSING, MEDICAL CARE, AND HEATING?: NOT HARD AT ALL

## 2023-07-12 SDOH — ECONOMIC STABILITY: FOOD INSECURITY: WITHIN THE PAST 12 MONTHS, YOU WORRIED THAT YOUR FOOD WOULD RUN OUT BEFORE YOU GOT MONEY TO BUY MORE.: NEVER TRUE

## 2023-07-12 SDOH — ECONOMIC STABILITY: HOUSING INSECURITY
IN THE LAST 12 MONTHS, WAS THERE A TIME WHEN YOU DID NOT HAVE A STEADY PLACE TO SLEEP OR SLEPT IN A SHELTER (INCLUDING NOW)?: NO

## 2023-07-12 ASSESSMENT — ENCOUNTER SYMPTOMS
DIARRHEA: 0
CONSTIPATION: 0
VOMITING: 0
BLOOD IN STOOL: 0
NAUSEA: 0
COUGH: 0
SHORTNESS OF BREATH: 0
WHEEZING: 0

## 2023-07-12 ASSESSMENT — PATIENT HEALTH QUESTIONNAIRE - PHQ9
6. FEELING BAD ABOUT YOURSELF - OR THAT YOU ARE A FAILURE OR HAVE LET YOURSELF OR YOUR FAMILY DOWN: 0
9. THOUGHTS THAT YOU WOULD BE BETTER OFF DEAD, OR OF HURTING YOURSELF: 0
10. IF YOU CHECKED OFF ANY PROBLEMS, HOW DIFFICULT HAVE THESE PROBLEMS MADE IT FOR YOU TO DO YOUR WORK, TAKE CARE OF THINGS AT HOME, OR GET ALONG WITH OTHER PEOPLE: 0
SUM OF ALL RESPONSES TO PHQ QUESTIONS 1-9: 0
2. FEELING DOWN, DEPRESSED OR HOPELESS: 0
SUM OF ALL RESPONSES TO PHQ QUESTIONS 1-9: 0
4. FEELING TIRED OR HAVING LITTLE ENERGY: 0
5. POOR APPETITE OR OVEREATING: 0
7. TROUBLE CONCENTRATING ON THINGS, SUCH AS READING THE NEWSPAPER OR WATCHING TELEVISION: 0
8. MOVING OR SPEAKING SO SLOWLY THAT OTHER PEOPLE COULD HAVE NOTICED. OR THE OPPOSITE, BEING SO FIGETY OR RESTLESS THAT YOU HAVE BEEN MOVING AROUND A LOT MORE THAN USUAL: 0
SUM OF ALL RESPONSES TO PHQ QUESTIONS 1-9: 0
SUM OF ALL RESPONSES TO PHQ QUESTIONS 1-9: 0
1. LITTLE INTEREST OR PLEASURE IN DOING THINGS: 0
SUM OF ALL RESPONSES TO PHQ9 QUESTIONS 1 & 2: 0
3. TROUBLE FALLING OR STAYING ASLEEP: 0

## 2023-07-12 NOTE — PROGRESS NOTES
7/12/2023 1:56 PM  Location:[address] INTERNAL MEDICINE  [place]  Patient #:  [id number]  YOB: 1960            History of Present Illness     Chief Complaint   Patient presents with    3 Month Follow-Up     Episodes of being lightheaded. No refills needed. Dexa due. Hypertension    Cholesterol Problem       Ms. Sammi Michaud is a 58 y.o. female  who presents for the above. Is having episodes of dizziness. When she gets up and down intermittently. Allergies   Allergen Reactions    Latex Rash    Heparin (Bovine) Anaphylaxis     Past Medical History:   Diagnosis Date    Anxiety and depression 12/27/2018    Arthritis of left knee 12/2/2020    Autoimmune disease ([facility])     fibromyalgia    CAD (coronary artery disease) 2006    mi    Chest pain     Coagulation defects     hx of dic ?  pe    GERD (gastroesophageal reflux disease)     Hypertension     Palpitations 8/22/2016    Aug 2014 - K+ 3.4, Mg 2.1 7 day monitor - single 8 beat run of atrial ectopy, asymptomatic Feb 2016 - 7 day monitor - normal tracing, all symptoms with NSR    PUD (peptic ulcer disease)     Thromboembolus ([facility])     lle and pe's--had had foot surgery; just one time     Social History     Socioeconomic History    Marital status:      Spouse name: None    Number of children: None    Years of education: None    Highest education level: None   Tobacco Use    Smoking status: Never    Smokeless tobacco: Never   Substance and Sexual Activity    Alcohol use: No     Social Determinants of Health     Financial Resource Strain: Low Risk     Difficulty of Paying Living Expenses: Not hard at all   Food Insecurity: No Food Insecurity    Worried About Running Out of Food in the Last Year: Never true    Ran Out of Food in the Last Year: Never true   Transportation Needs: Unknown    Lack of Transportation (Non-Medical): No   Housing Stability: Unknown    Unstable Housing in the Last Year: No     Past Surgical

## 2023-07-13 LAB
ALBUMIN SERPL-MCNC: 4 G/DL (ref 3.2–4.6)
ALBUMIN/GLOB SERPL: 1.5 (ref 0.4–1.6)
ALP SERPL-CCNC: 90 U/L (ref 50–136)
ALT SERPL-CCNC: 27 U/L (ref 12–65)
ANION GAP SERPL CALC-SCNC: 7 MMOL/L (ref 2–11)
AST SERPL-CCNC: 13 U/L (ref 15–37)
BILIRUB SERPL-MCNC: 0.3 MG/DL (ref 0.2–1.1)
BUN SERPL-MCNC: 13 MG/DL (ref 8–23)
CALCIUM SERPL-MCNC: 9.8 MG/DL (ref 8.3–10.4)
CHLORIDE SERPL-SCNC: 110 MMOL/L (ref 101–110)
CO2 SERPL-SCNC: 27 MMOL/L (ref 21–32)
CREAT SERPL-MCNC: 0.8 MG/DL (ref 0.6–1)
EST. AVERAGE GLUCOSE BLD GHB EST-MCNC: 108 MG/DL
GLOBULIN SER CALC-MCNC: 2.7 G/DL (ref 2.8–4.5)
GLUCOSE SERPL-MCNC: 116 MG/DL (ref 65–100)
HBA1C MFR BLD: 5.4 % (ref 4.8–5.6)
POTASSIUM SERPL-SCNC: 4.3 MMOL/L (ref 3.5–5.1)
PROT SERPL-MCNC: 6.7 G/DL (ref 6.3–8.2)
SODIUM SERPL-SCNC: 144 MMOL/L (ref 133–143)

## 2023-09-01 ENCOUNTER — TELEPHONE (OUTPATIENT)
Dept: INTERNAL MEDICINE CLINIC | Facility: CLINIC | Age: 63
End: 2023-09-01

## 2023-09-01 ENCOUNTER — PATIENT MESSAGE (OUTPATIENT)
Dept: INTERNAL MEDICINE CLINIC | Facility: CLINIC | Age: 63
End: 2023-09-01

## 2023-09-01 NOTE — TELEPHONE ENCOUNTER
----- Message from Shorty Garcia sent at 8/31/2023 12:46 PM EDT -----  Subject: Medication Problem    Medication: dexlansoprazole (DEXILANT) 60 MG CPDR delayed release capsule  Dosage: 60 MG 1 time a day  Ordering Provider: snehal    Question/Problem: Pt. called and needs a Prior Auth completed before the   Pharmacy can refill this medication. Please call Pt. when completed.       Pharmacy: [facility] [address] [place] [address], [facility] [phone]    ---------------------------------------------------------------------------  --------------  Aury Whitley INFO  [phone]; OK to leave message on voicemail  ---------------------------------------------------------------------------  --------------    SCRIPT ANSWERS  Relationship to Patient: Self

## 2023-09-12 RX ORDER — BUTALBITAL, ACETAMINOPHEN AND CAFFEINE 50; 325; 40 MG/1; MG/1; MG/1
1 TABLET ORAL EVERY 6 HOURS PRN
Qty: 90 TABLET | Refills: 0 | Status: SHIPPED | OUTPATIENT
Start: 2023-09-12

## 2023-09-17 RX ORDER — POTASSIUM CHLORIDE 20 MEQ/1
20 TABLET, EXTENDED RELEASE ORAL 2 TIMES DAILY
Qty: 180 TABLET | Refills: 3 | Status: SHIPPED | OUTPATIENT
Start: 2023-09-17

## 2023-10-09 RX ORDER — METOPROLOL SUCCINATE 25 MG/1
TABLET, EXTENDED RELEASE ORAL
Qty: 180 TABLET | Refills: 2 | Status: SHIPPED | OUTPATIENT
Start: 2023-10-09

## 2023-10-09 NOTE — TELEPHONE ENCOUNTER
Requested Prescriptions     Pending Prescriptions Disp Refills    metoprolol succinate (TOPROL XL) 25 MG extended release tablet [Pharmacy Med Name: METOPROLOL ER SUCCINATE 25MG TABS] 180 tablet 2     Sig: TAKE 1 TABLET BY MOUTH TWICE DAILY

## 2023-11-16 RX ORDER — LISINOPRIL 20 MG/1
TABLET ORAL
COMMUNITY
Start: 2023-10-05 | End: 2023-11-16 | Stop reason: SDUPTHER

## 2023-11-16 RX ORDER — LISINOPRIL 20 MG/1
20 TABLET ORAL 2 TIMES DAILY
Qty: 90 TABLET | Refills: 3 | Status: SHIPPED | OUTPATIENT
Start: 2023-11-16

## 2023-11-16 NOTE — TELEPHONE ENCOUNTER
Called and let pt know that I could refill lisinopril 40MG daily and she wants the 20MG filled taking twice daily.  Shayla Perrin

## 2023-11-16 NOTE — TELEPHONE ENCOUNTER
MEDICATION REFILL REQUEST      Name of Medication:  Lisinopril  Dose:  30 mg  Frequency:  BID  Quantity:  180  Days' supply:  80  with refills      Pharmacy Name/Location:  [facility][phone]    Pt  said Dr Manuel Quick  changed and told her to take 2 pills a day instead of 1

## 2023-11-26 RX ORDER — CLOPIDOGREL BISULFATE 75 MG/1
75 TABLET ORAL DAILY
Qty: 90 TABLET | Refills: 3 | Status: SHIPPED | OUTPATIENT
Start: 2023-11-26

## 2023-11-27 ENCOUNTER — OFFICE VISIT (OUTPATIENT)
Age: 63
End: 2023-11-27
Payer: COMMERCIAL

## 2023-11-27 VITALS
HEIGHT: 67 IN | BODY MASS INDEX: 37.67 KG/M2 | SYSTOLIC BLOOD PRESSURE: 110 MMHG | WEIGHT: 240 LBS | DIASTOLIC BLOOD PRESSURE: 72 MMHG | HEART RATE: 64 BPM

## 2023-11-27 DIAGNOSIS — I25.10 CORONARY ARTERY DISEASE INVOLVING NATIVE HEART WITHOUT ANGINA PECTORIS, UNSPECIFIED VESSEL OR LESION TYPE: ICD-10-CM

## 2023-11-27 DIAGNOSIS — I10 ESSENTIAL HYPERTENSION: ICD-10-CM

## 2023-11-27 DIAGNOSIS — E78.2 MIXED HYPERLIPIDEMIA: ICD-10-CM

## 2023-11-27 DIAGNOSIS — R00.2 PALPITATIONS: Primary | ICD-10-CM

## 2023-11-27 DIAGNOSIS — F41.1 GENERALIZED ANXIETY DISORDER: ICD-10-CM

## 2023-11-27 PROCEDURE — 3078F DIAST BP <80 MM HG: CPT | Performed by: INTERNAL MEDICINE

## 2023-11-27 PROCEDURE — 99214 OFFICE O/P EST MOD 30 MIN: CPT | Performed by: INTERNAL MEDICINE

## 2023-11-27 PROCEDURE — 3074F SYST BP LT 130 MM HG: CPT | Performed by: INTERNAL MEDICINE

## 2023-11-27 NOTE — PROGRESS NOTES
gallops  Abdomen/GI - soft, nontender, nondistended, no masses or organomegaly  Musculoskeletal - no joint tenderness, deformity or swelling  Extremities - peripheral pulses normal, no pedal edema, no clubbing or cyanosis  Skin - normal coloration and turgor, no rashes, no suspicious skin lesions noted    EKG: normal EKG, normal sinus rhythm. Medications reviewed and questions answered    No results found for this or any previous visit (from the past 672 hour(s)).   Lab Results   Component Value Date/Time    CHOL 122 01/13/2023 11:50 AM    HDL 48 01/13/2023 11:50 AM    VLDL 24 11/24/2021 11:34 AM       ASSESSMENT and PLAN  Problem List Items Addressed This Visit    None     Mild concern for cardiac issues   -stress management   -demonstrated how to utilize apple watch at last encounter on 5/15/23- track rhythm during episodes that are concerning     HTN   -sleep study ordered at last office visit on 5/15/23   -continue lisinopril 20 mg 2 tabs po     Hyperlipidemia   -continue rosuvastatin 5 mg   -Lipid panel on 1/13/2023: total cholesterol: 122; LDL: 46.4  -A1C on 7/12/23: 5.4       Continue meds as below    Current Outpatient Medications:     clopidogrel (PLAVIX) 75 MG tablet, TAKE 1 TABLET BY MOUTH DAILY, Disp: 90 tablet, Rfl: 3    lisinopril (PRINIVIL;ZESTRIL) 20 MG tablet, Take 1 tablet by mouth in the morning and at bedtime, Disp: 90 tablet, Rfl: 3    metoprolol succinate (TOPROL XL) 25 MG extended release tablet, TAKE 1 TABLET BY MOUTH TWICE DAILY, Disp: 180 tablet, Rfl: 2    potassium chloride (KLOR-CON M) 20 MEQ extended release tablet, TAKE 1 TABLET BY MOUTH TWICE DAILY, Disp: 180 tablet, Rfl: 3    butalbital-acetaminophen-caffeine (FIORICET, ESGIC) -40 MG per tablet, TAKE 1 TABLET BY MOUTH EVERY 6 HOURS AS NEEDED FOR MIGRAINE, Disp: 90 tablet, Rfl: 0    escitalopram (LEXAPRO) 5 MG tablet, TAKE 1 TABLET BY MOUTH DAILY, Disp: 90 tablet, Rfl: 1    cyclobenzaprine (FLEXERIL) 10 MG tablet, TAKE 1

## 2023-12-04 DIAGNOSIS — F32.A ANXIETY AND DEPRESSION: Primary | ICD-10-CM

## 2023-12-04 DIAGNOSIS — F41.9 ANXIETY AND DEPRESSION: Primary | ICD-10-CM

## 2023-12-04 RX ORDER — LORAZEPAM 0.5 MG/1
0.5 TABLET ORAL 2 TIMES DAILY PRN
Qty: 30 TABLET | Refills: 2 | Status: SHIPPED | OUTPATIENT
Start: 2023-12-04 | End: 2024-03-03

## 2023-12-04 RX ORDER — ESCITALOPRAM OXALATE 5 MG/1
5 TABLET ORAL DAILY
Qty: 90 TABLET | Refills: 1 | Status: SHIPPED | OUTPATIENT
Start: 2023-12-04

## 2023-12-04 RX ORDER — POTASSIUM CHLORIDE 20 MEQ/1
20 TABLET, EXTENDED RELEASE ORAL 2 TIMES DAILY
Qty: 180 TABLET | Refills: 3 | Status: SHIPPED | OUTPATIENT
Start: 2023-12-04

## 2023-12-04 RX ORDER — FAMOTIDINE 20 MG/1
20 TABLET, FILM COATED ORAL NIGHTLY PRN
Qty: 60 TABLET | Refills: 3 | Status: SHIPPED | OUTPATIENT
Start: 2023-12-04

## 2023-12-04 RX ORDER — ROSUVASTATIN CALCIUM 5 MG/1
5 TABLET, COATED ORAL DAILY
Qty: 90 TABLET | Refills: 3 | Status: SHIPPED | OUTPATIENT
Start: 2023-12-04 | End: 2024-01-25 | Stop reason: SDUPTHER

## 2023-12-04 RX ORDER — CLOPIDOGREL BISULFATE 75 MG/1
75 TABLET ORAL DAILY
Qty: 90 TABLET | Refills: 3 | Status: SHIPPED | OUTPATIENT
Start: 2023-12-04

## 2023-12-04 RX ORDER — BUTALBITAL, ACETAMINOPHEN AND CAFFEINE 50; 325; 40 MG/1; MG/1; MG/1
1 TABLET ORAL EVERY 6 HOURS PRN
Qty: 90 TABLET | Refills: 0 | Status: SHIPPED | OUTPATIENT
Start: 2023-12-04

## 2023-12-04 NOTE — TELEPHONE ENCOUNTER
I have reviewed the patient’s controlled substance prescription history, as maintained in the [place] prescription monitoring program, so that the prescription(s) for a  controlled substance can be given.

## 2023-12-05 RX ORDER — LINACLOTIDE 290 UG/1
1 CAPSULE, GELATIN COATED ORAL
Qty: 30 CAPSULE | Refills: 5 | Status: SHIPPED | OUTPATIENT
Start: 2023-12-05

## 2024-01-25 ENCOUNTER — OFFICE VISIT (OUTPATIENT)
Dept: INTERNAL MEDICINE CLINIC | Facility: CLINIC | Age: 64
End: 2024-01-25

## 2024-01-25 VITALS
SYSTOLIC BLOOD PRESSURE: 140 MMHG | BODY MASS INDEX: 37.51 KG/M2 | HEART RATE: 71 BPM | DIASTOLIC BLOOD PRESSURE: 76 MMHG | WEIGHT: 239 LBS | RESPIRATION RATE: 18 BRPM | HEIGHT: 67 IN

## 2024-01-25 DIAGNOSIS — K58.1 IRRITABLE BOWEL SYNDROME WITH CONSTIPATION: ICD-10-CM

## 2024-01-25 DIAGNOSIS — Z11.4 SCREENING FOR HIV (HUMAN IMMUNODEFICIENCY VIRUS): ICD-10-CM

## 2024-01-25 DIAGNOSIS — M15.9 GENERALIZED OSTEOARTHRITIS: ICD-10-CM

## 2024-01-25 DIAGNOSIS — G43.009 MIGRAINE WITHOUT AURA AND WITHOUT STATUS MIGRAINOSUS, NOT INTRACTABLE: ICD-10-CM

## 2024-01-25 DIAGNOSIS — F32.A ANXIETY AND DEPRESSION: ICD-10-CM

## 2024-01-25 DIAGNOSIS — E78.2 MIXED HYPERLIPIDEMIA: ICD-10-CM

## 2024-01-25 DIAGNOSIS — L72.9: ICD-10-CM

## 2024-01-25 DIAGNOSIS — I25.10 CORONARY ARTERY DISEASE INVOLVING NATIVE HEART WITHOUT ANGINA PECTORIS, UNSPECIFIED VESSEL OR LESION TYPE: Primary | ICD-10-CM

## 2024-01-25 DIAGNOSIS — R73.01 IMPAIRED FASTING GLUCOSE: ICD-10-CM

## 2024-01-25 DIAGNOSIS — F41.9 ANXIETY AND DEPRESSION: ICD-10-CM

## 2024-01-25 DIAGNOSIS — K21.9 GASTROESOPHAGEAL REFLUX DISEASE, UNSPECIFIED WHETHER ESOPHAGITIS PRESENT: ICD-10-CM

## 2024-01-25 DIAGNOSIS — Z00.00 ENCOUNTER FOR WELL ADULT EXAM WITHOUT ABNORMAL FINDINGS: ICD-10-CM

## 2024-01-25 DIAGNOSIS — E66.01 CLASS 2 SEVERE OBESITY DUE TO EXCESS CALORIES WITH SERIOUS COMORBIDITY AND BODY MASS INDEX (BMI) OF 37.0 TO 37.9 IN ADULT (HCC): ICD-10-CM

## 2024-01-25 DIAGNOSIS — I10 PRIMARY HYPERTENSION: ICD-10-CM

## 2024-01-25 LAB
ALBUMIN SERPL-MCNC: 4.2 G/DL (ref 3.2–4.6)
ALBUMIN/GLOB SERPL: 1.6 (ref 0.4–1.6)
ALP SERPL-CCNC: 74 U/L (ref 50–136)
ALT SERPL-CCNC: 30 U/L (ref 12–65)
ANION GAP SERPL CALC-SCNC: 2 MMOL/L (ref 2–11)
APPEARANCE UR: CLEAR
AST SERPL-CCNC: 16 U/L (ref 15–37)
BASOPHILS # BLD: 0.1 K/UL (ref 0–0.2)
BASOPHILS NFR BLD: 1 % (ref 0–2)
BILIRUB SERPL-MCNC: 0.5 MG/DL (ref 0.2–1.1)
BILIRUB UR QL: NEGATIVE
BUN SERPL-MCNC: 13 MG/DL (ref 8–23)
CALCIUM SERPL-MCNC: 9.7 MG/DL (ref 8.3–10.4)
CHLORIDE SERPL-SCNC: 109 MMOL/L (ref 103–113)
CHOLEST SERPL-MCNC: 123 MG/DL
CO2 SERPL-SCNC: 28 MMOL/L (ref 21–32)
COLOR UR: NORMAL
CREAT SERPL-MCNC: 0.8 MG/DL (ref 0.6–1)
DIFFERENTIAL METHOD BLD: NORMAL
EOSINOPHIL # BLD: 0.4 K/UL (ref 0–0.8)
EOSINOPHIL NFR BLD: 6 % (ref 0.5–7.8)
ERYTHROCYTE [DISTWIDTH] IN BLOOD BY AUTOMATED COUNT: 13.1 % (ref 11.9–14.6)
EST. AVERAGE GLUCOSE BLD GHB EST-MCNC: 114 MG/DL
GLOBULIN SER CALC-MCNC: 2.6 G/DL (ref 2.8–4.5)
GLUCOSE SERPL-MCNC: 115 MG/DL (ref 65–100)
GLUCOSE UR STRIP.AUTO-MCNC: NEGATIVE MG/DL
HBA1C MFR BLD: 5.6 % (ref 4.8–5.6)
HCT VFR BLD AUTO: 42.7 % (ref 35.8–46.3)
HDLC SERPL-MCNC: 45 MG/DL (ref 40–60)
HDLC SERPL: 2.7
HGB BLD-MCNC: 13.7 G/DL (ref 11.7–15.4)
HGB UR QL STRIP: NEGATIVE
HIV 1+2 AB+HIV1 P24 AG SERPL QL IA: NONREACTIVE
HIV 1/2 RESULT COMMENT: NORMAL
IMM GRANULOCYTES # BLD AUTO: 0 K/UL (ref 0–0.5)
IMM GRANULOCYTES NFR BLD AUTO: 0 % (ref 0–5)
KETONES UR QL STRIP.AUTO: NEGATIVE MG/DL
LDLC SERPL CALC-MCNC: 54.4 MG/DL
LEUKOCYTE ESTERASE UR QL STRIP.AUTO: NEGATIVE
LYMPHOCYTES # BLD: 1.5 K/UL (ref 0.5–4.6)
LYMPHOCYTES NFR BLD: 21 % (ref 13–44)
MCH RBC QN AUTO: 29.7 PG (ref 26.1–32.9)
MCHC RBC AUTO-ENTMCNC: 32.1 G/DL (ref 31.4–35)
MCV RBC AUTO: 92.6 FL (ref 82–102)
MONOCYTES # BLD: 0.5 K/UL (ref 0.1–1.3)
MONOCYTES NFR BLD: 6 % (ref 4–12)
NEUTS SEG # BLD: 4.6 K/UL (ref 1.7–8.2)
NEUTS SEG NFR BLD: 66 % (ref 43–78)
NITRITE UR QL STRIP.AUTO: NEGATIVE
NRBC # BLD: 0 K/UL (ref 0–0.2)
PH UR STRIP: 5.5 (ref 5–9)
PLATELET # BLD AUTO: 220 K/UL (ref 150–450)
PMV BLD AUTO: 10 FL (ref 9.4–12.3)
POTASSIUM SERPL-SCNC: 4.4 MMOL/L (ref 3.5–5.1)
PROT SERPL-MCNC: 6.8 G/DL (ref 6.3–8.2)
PROT UR STRIP-MCNC: NEGATIVE MG/DL
RBC # BLD AUTO: 4.61 M/UL (ref 4.05–5.2)
SODIUM SERPL-SCNC: 139 MMOL/L (ref 136–146)
SP GR UR REFRACTOMETRY: 1.01 (ref 1–1.02)
TRIGL SERPL-MCNC: 118 MG/DL (ref 35–150)
TSH W FREE THYROID IF ABNORMAL: 0.75 UIU/ML (ref 0.36–3.74)
UROBILINOGEN UR QL STRIP.AUTO: 0.2 EU/DL (ref 0.2–1)
VLDLC SERPL CALC-MCNC: 23.6 MG/DL (ref 6–23)
WBC # BLD AUTO: 7 K/UL (ref 4.3–11.1)

## 2024-01-25 RX ORDER — OLMESARTAN MEDOXOMIL 40 MG/1
40 TABLET ORAL DAILY
Qty: 90 TABLET | Refills: 1 | Status: SHIPPED | OUTPATIENT
Start: 2024-01-25

## 2024-01-25 RX ORDER — DEXLANSOPRAZOLE 60 MG/1
60 CAPSULE, DELAYED RELEASE ORAL DAILY
Qty: 30 CAPSULE | Refills: 5 | Status: SHIPPED | OUTPATIENT
Start: 2024-01-25

## 2024-01-25 RX ORDER — ROSUVASTATIN CALCIUM 5 MG/1
5 TABLET, COATED ORAL DAILY
Qty: 90 TABLET | Refills: 3 | Status: SHIPPED | OUTPATIENT
Start: 2024-01-25

## 2024-01-25 ASSESSMENT — ENCOUNTER SYMPTOMS
WHEEZING: 0
BLOOD IN STOOL: 0
CONSTIPATION: 0
VOMITING: 0
NAUSEA: 0
COUGH: 0
DIARRHEA: 0
SHORTNESS OF BREATH: 0

## 2024-01-25 NOTE — PROGRESS NOTES
tenderness. There is no right CVA tenderness or left CVA tenderness.   Musculoskeletal:      Cervical back: No tenderness.      Thoracic back: No tenderness.      Lumbar back: No tenderness.      Right lower leg: No edema.      Left lower leg: No edema.   Lymphadenopathy:      Upper Body:      Right upper body: No supraclavicular or axillary adenopathy.      Left upper body: No supraclavicular or axillary adenopathy.   Skin:     Findings: Lesion (mucous retention cyst on her thumb noted) present.   Neurological:      Mental Status: She is alert.      Motor: No weakness.      Gait: Gait normal.      Deep Tendon Reflexes:      Reflex Scores:       Patellar reflexes are 2+ on the right side and 2+ on the left side.  Psychiatric:         Mood and Affect: Mood normal.         Behavior: Behavior normal.         Assessment & Plan    Current Outpatient Medications   Medication Sig Dispense Refill   • rosuvastatin (CRESTOR) 5 MG tablet Take 1 tablet by mouth daily 90 tablet 3   • dexlansoprazole (DEXILANT) 60 MG CPDR delayed release capsule Take 1 capsule by mouth daily 30 capsule 5   • olmesartan (BENICAR) 40 MG tablet Take 1 tablet by mouth daily 90 tablet 1   • linaclotide (LINZESS) 290 MCG CAPS capsule Take 1 capsule by mouth every morning (before breakfast) 30 capsule 5   • butalbital-acetaminophen-caffeine (FIORICET, ESGIC) -40 MG per tablet Take 1 tablet by mouth every 6 hours as needed for Migraine 90 tablet 0   • escitalopram (LEXAPRO) 5 MG tablet Take 1 tablet by mouth daily 90 tablet 1   • famotidine (PEPCID) 20 MG tablet Take 1 tablet by mouth nightly as needed (prn) 60 tablet 3   • clopidogrel (PLAVIX) 75 MG tablet Take 1 tablet by mouth daily 90 tablet 3   • VORTIoxetine HBr (TRINTELLIX) 20 MG TABS tablet Take 1 tablet by mouth daily 90 tablet 3   • LORazepam (ATIVAN) 0.5 MG tablet Take 1 tablet by mouth 2 times daily as needed for Anxiety for up to 90 days. Max Daily Amount: 1 mg 30 tablet 2   •

## 2024-01-31 ENCOUNTER — PROCEDURE VISIT (OUTPATIENT)
Dept: INTERNAL MEDICINE CLINIC | Facility: CLINIC | Age: 64
End: 2024-01-31
Payer: COMMERCIAL

## 2024-01-31 VITALS
HEART RATE: 67 BPM | TEMPERATURE: 97 F | SYSTOLIC BLOOD PRESSURE: 126 MMHG | RESPIRATION RATE: 18 BRPM | DIASTOLIC BLOOD PRESSURE: 58 MMHG | WEIGHT: 241 LBS | HEIGHT: 67 IN | BODY MASS INDEX: 37.83 KG/M2 | OXYGEN SATURATION: 98 %

## 2024-01-31 DIAGNOSIS — L72.9 MUCOUS RETENTION SKIN CYST: Primary | ICD-10-CM

## 2024-01-31 PROCEDURE — 3078F DIAST BP <80 MM HG: CPT | Performed by: NURSE PRACTITIONER

## 2024-01-31 PROCEDURE — 99213 OFFICE O/P EST LOW 20 MIN: CPT | Performed by: NURSE PRACTITIONER

## 2024-01-31 PROCEDURE — 3074F SYST BP LT 130 MM HG: CPT | Performed by: NURSE PRACTITIONER

## 2024-01-31 PROCEDURE — 10060 I&D ABSCESS SIMPLE/SINGLE: CPT | Performed by: NURSE PRACTITIONER

## 2024-01-31 RX ORDER — PANTOPRAZOLE SODIUM 40 MG/1
40 TABLET, DELAYED RELEASE ORAL
COMMUNITY
Start: 2024-01-03

## 2024-01-31 ASSESSMENT — PATIENT HEALTH QUESTIONNAIRE - PHQ9
6. FEELING BAD ABOUT YOURSELF - OR THAT YOU ARE A FAILURE OR HAVE LET YOURSELF OR YOUR FAMILY DOWN: 0
SUM OF ALL RESPONSES TO PHQ QUESTIONS 1-9: 6
4. FEELING TIRED OR HAVING LITTLE ENERGY: 3
3. TROUBLE FALLING OR STAYING ASLEEP: 3
9. THOUGHTS THAT YOU WOULD BE BETTER OFF DEAD, OR OF HURTING YOURSELF: 0
SUM OF ALL RESPONSES TO PHQ9 QUESTIONS 1 & 2: 0
SUM OF ALL RESPONSES TO PHQ QUESTIONS 1-9: 6
2. FEELING DOWN, DEPRESSED OR HOPELESS: 0
SUM OF ALL RESPONSES TO PHQ QUESTIONS 1-9: 6
SUM OF ALL RESPONSES TO PHQ QUESTIONS 1-9: 0
7. TROUBLE CONCENTRATING ON THINGS, SUCH AS READING THE NEWSPAPER OR WATCHING TELEVISION: 0
SUM OF ALL RESPONSES TO PHQ QUESTIONS 1-9: 6
SUM OF ALL RESPONSES TO PHQ QUESTIONS 1-9: 0
10. IF YOU CHECKED OFF ANY PROBLEMS, HOW DIFFICULT HAVE THESE PROBLEMS MADE IT FOR YOU TO DO YOUR WORK, TAKE CARE OF THINGS AT HOME, OR GET ALONG WITH OTHER PEOPLE: 1
1. LITTLE INTEREST OR PLEASURE IN DOING THINGS: 0
SUM OF ALL RESPONSES TO PHQ QUESTIONS 1-9: 0
1. LITTLE INTEREST OR PLEASURE IN DOING THINGS: 0
SUM OF ALL RESPONSES TO PHQ QUESTIONS 1-9: 0

## 2024-01-31 NOTE — PROGRESS NOTES
1/31/2024 9:05 AM  Location:[facility] PHYSICIAN SERVICES  [facility] [place] MEDICINE  [place]  Patient #:  [id number]  YOB: 1960          YOUR LAST HEMOGLOBIN A1CS:   No results found for: \"HBA1C\", \"[id number]\"    YOUR LAST LIPID PROFILE:   Lab Results   Component Value Date/Time    CHOL 123 01/25/2024 11:24 AM    HDL 45 01/25/2024 11:24 AM    VLDL 24 11/24/2021 11:34 AM         Lab Results   Component Value Date/Time    GFRAA >60 09/14/2022 12:00 PM    BUN 13 01/25/2024 11:24 AM     01/25/2024 11:24 AM    K 4.4 01/25/2024 11:24 AM     01/25/2024 11:24 AM    CO2 28 01/25/2024 11:24 AM           History of Present Illness     Chief Complaint   Patient presents with    Follow-up     Procedure       Ms. Dumont is a 63 y.o. female  who presents for the above mentioned complaints.  Ms. Dumont presents for cyst removal.  She has a bothersome cyst on her right first DIP.  It has been small for many years, recently began to get larger.  She does not notice any pain except for when she is holding something like scissors and it presses into the equipment.  She denies finger numbness, redness or warmth.  She has no history of gout.         Allergies   Allergen Reactions    Latex Rash    Heparin (Bovine) Anaphylaxis     Past Medical History:   Diagnosis Date    Anxiety and depression 12/27/2018    Arthritis of left knee 12/2/2020    Autoimmune disease ([facility])     fibromyalgia    CAD (coronary artery disease) 2006    mi    Chest pain     Coagulation defects     hx of dic ? pe    GERD (gastroesophageal reflux disease)     Hypertension     Palpitations 8/22/2016    Aug 2014 - K+ 3.4, Mg 2.1 7 day monitor - single 8 beat run of atrial ectopy, asymptomatic Feb 2016 - 7 day monitor - normal tracing, all symptoms with NSR    PUD (peptic ulcer disease)     Thromboembolus (HCC)     lle and pe's--had had foot surgery; just one time     Social History     Socioeconomic History    Marital status:

## 2024-07-01 RX ORDER — PANTOPRAZOLE SODIUM 40 MG/1
40 TABLET, DELAYED RELEASE ORAL
Qty: 90 TABLET | OUTPATIENT
Start: 2024-07-01

## 2024-07-05 RX ORDER — PANTOPRAZOLE SODIUM 40 MG/1
40 TABLET, DELAYED RELEASE ORAL
Qty: 90 TABLET | Refills: 3 | Status: SHIPPED | OUTPATIENT
Start: 2024-07-05

## 2024-07-05 NOTE — TELEPHONE ENCOUNTER
Name of Medication: pantoprazole (PROTONIX)     Strength: 40 MG tablet     Directions: Take 1 tablet by mouth every morning (before breakfast)     30 day or 90 day: 30    What Pharmacy: [facility] - [address] [place], [place] - [phone] (P) [phone] (F)    Any additional information for provider:   Pt states this medication was supposed to have been transferred to [facility] back when all of her other medications were transferred but it somehow was overlooked.

## 2024-07-11 DIAGNOSIS — F32.A ANXIETY AND DEPRESSION: Primary | ICD-10-CM

## 2024-07-11 DIAGNOSIS — F41.9 ANXIETY AND DEPRESSION: Primary | ICD-10-CM

## 2024-07-11 RX ORDER — CYCLOBENZAPRINE HCL 10 MG
TABLET ORAL
Qty: 30 TABLET | Refills: 3 | Status: SHIPPED | OUTPATIENT
Start: 2024-07-11

## 2024-07-11 RX ORDER — LORAZEPAM 0.5 MG/1
0.5 TABLET ORAL 2 TIMES DAILY PRN
Qty: 30 TABLET | Refills: 2 | Status: SHIPPED | OUTPATIENT
Start: 2024-07-11 | End: 2024-10-09

## 2024-07-11 RX ORDER — LINACLOTIDE 290 UG/1
1 CAPSULE, GELATIN COATED ORAL
Qty: 30 CAPSULE | Refills: 5 | Status: SHIPPED | OUTPATIENT
Start: 2024-07-11

## 2024-08-06 ENCOUNTER — OFFICE VISIT (OUTPATIENT)
Dept: INTERNAL MEDICINE CLINIC | Facility: CLINIC | Age: 64
End: 2024-08-06
Payer: COMMERCIAL

## 2024-08-06 VITALS
RESPIRATION RATE: 18 BRPM | SYSTOLIC BLOOD PRESSURE: 138 MMHG | HEART RATE: 85 BPM | WEIGHT: 243 LBS | BODY MASS INDEX: 38.14 KG/M2 | HEIGHT: 67 IN | DIASTOLIC BLOOD PRESSURE: 59 MMHG

## 2024-08-06 DIAGNOSIS — R73.01 IMPAIRED FASTING GLUCOSE: ICD-10-CM

## 2024-08-06 DIAGNOSIS — I25.10 CORONARY ARTERY DISEASE INVOLVING NATIVE HEART WITHOUT ANGINA PECTORIS, UNSPECIFIED VESSEL OR LESION TYPE: ICD-10-CM

## 2024-08-06 DIAGNOSIS — M25.571 CHRONIC PAIN OF RIGHT ANKLE: ICD-10-CM

## 2024-08-06 DIAGNOSIS — E66.01 CLASS 2 SEVERE OBESITY DUE TO EXCESS CALORIES WITH SERIOUS COMORBIDITY AND BODY MASS INDEX (BMI) OF 38.0 TO 38.9 IN ADULT (HCC): ICD-10-CM

## 2024-08-06 DIAGNOSIS — G89.29 CHRONIC PAIN OF RIGHT ANKLE: ICD-10-CM

## 2024-08-06 DIAGNOSIS — F32.A ANXIETY AND DEPRESSION: ICD-10-CM

## 2024-08-06 DIAGNOSIS — K21.9 GASTROESOPHAGEAL REFLUX DISEASE, UNSPECIFIED WHETHER ESOPHAGITIS PRESENT: ICD-10-CM

## 2024-08-06 DIAGNOSIS — L72.9 MUCOUS RETENTION SKIN CYST: ICD-10-CM

## 2024-08-06 DIAGNOSIS — M16.12 ARTHRITIS OF LEFT HIP: ICD-10-CM

## 2024-08-06 DIAGNOSIS — R60.9 DEPENDENT EDEMA: ICD-10-CM

## 2024-08-06 DIAGNOSIS — M15.9 GENERALIZED OSTEOARTHRITIS: ICD-10-CM

## 2024-08-06 DIAGNOSIS — M25.562 CHRONIC PAIN OF LEFT KNEE: ICD-10-CM

## 2024-08-06 DIAGNOSIS — Z12.31 OTHER SCREENING MAMMOGRAM: ICD-10-CM

## 2024-08-06 DIAGNOSIS — I10 PRIMARY HYPERTENSION: Primary | ICD-10-CM

## 2024-08-06 DIAGNOSIS — F41.9 ANXIETY AND DEPRESSION: ICD-10-CM

## 2024-08-06 DIAGNOSIS — G89.29 CHRONIC PAIN OF LEFT KNEE: ICD-10-CM

## 2024-08-06 DIAGNOSIS — I10 PRIMARY HYPERTENSION: ICD-10-CM

## 2024-08-06 DIAGNOSIS — E78.2 MIXED HYPERLIPIDEMIA: ICD-10-CM

## 2024-08-06 LAB
ALBUMIN SERPL-MCNC: 4.4 G/DL (ref 3.2–4.6)
ALBUMIN/GLOB SERPL: 1.5 (ref 1–1.9)
ALP SERPL-CCNC: 81 U/L (ref 35–104)
ALT SERPL-CCNC: 21 U/L (ref 12–65)
ANION GAP SERPL CALC-SCNC: 10 MMOL/L (ref 9–18)
AST SERPL-CCNC: 27 U/L (ref 15–37)
BILIRUB SERPL-MCNC: 0.2 MG/DL (ref 0–1.2)
BUN SERPL-MCNC: 11 MG/DL (ref 8–23)
CALCIUM SERPL-MCNC: 10 MG/DL (ref 8.8–10.2)
CHLORIDE SERPL-SCNC: 103 MMOL/L (ref 98–107)
CHOLEST SERPL-MCNC: 139 MG/DL (ref 0–200)
CO2 SERPL-SCNC: 26 MMOL/L (ref 20–28)
CREAT SERPL-MCNC: 0.85 MG/DL (ref 0.6–1.1)
EST. AVERAGE GLUCOSE BLD GHB EST-MCNC: 132 MG/DL
GLOBULIN SER CALC-MCNC: 2.9 G/DL (ref 2.3–3.5)
GLUCOSE SERPL-MCNC: 111 MG/DL (ref 70–99)
HBA1C MFR BLD: 6.2 % (ref 0–5.6)
HDLC SERPL-MCNC: 48 MG/DL (ref 40–60)
HDLC SERPL: 2.9 (ref 0–5)
LDLC SERPL CALC-MCNC: 66 MG/DL (ref 0–100)
POTASSIUM SERPL-SCNC: 3.8 MMOL/L (ref 3.5–5.1)
PROT SERPL-MCNC: 7.3 G/DL (ref 6.3–8.2)
SODIUM SERPL-SCNC: 139 MMOL/L (ref 136–145)
TRIGL SERPL-MCNC: 124 MG/DL (ref 0–150)
VLDLC SERPL CALC-MCNC: 25 MG/DL (ref 6–23)

## 2024-08-06 PROCEDURE — 99214 OFFICE O/P EST MOD 30 MIN: CPT | Performed by: INTERNAL MEDICINE

## 2024-08-06 PROCEDURE — 3075F SYST BP GE 130 - 139MM HG: CPT | Performed by: INTERNAL MEDICINE

## 2024-08-06 PROCEDURE — 3078F DIAST BP <80 MM HG: CPT | Performed by: INTERNAL MEDICINE

## 2024-08-06 RX ORDER — TIRZEPATIDE 2.5 MG/.5ML
2.5 INJECTION, SOLUTION SUBCUTANEOUS
Qty: 2 ML | Refills: 5 | Status: SHIPPED | OUTPATIENT
Start: 2024-08-06

## 2024-08-06 RX ORDER — FLUOXETINE HYDROCHLORIDE 20 MG/1
20 CAPSULE ORAL DAILY
Qty: 30 CAPSULE | Refills: 3 | Status: SHIPPED | OUTPATIENT
Start: 2024-08-06

## 2024-08-06 RX ORDER — RABEPRAZOLE SODIUM 20 MG/1
20 TABLET, DELAYED RELEASE ORAL DAILY
Qty: 30 TABLET | Refills: 3 | Status: SHIPPED | OUTPATIENT
Start: 2024-08-06

## 2024-08-06 RX ORDER — LISINOPRIL 20 MG/1
20 TABLET ORAL DAILY
COMMUNITY
Start: 2024-07-16

## 2024-08-06 RX ORDER — BUSPIRONE HYDROCHLORIDE 10 MG/1
10 TABLET ORAL 2 TIMES DAILY
COMMUNITY

## 2024-08-06 SDOH — ECONOMIC STABILITY: FOOD INSECURITY: WITHIN THE PAST 12 MONTHS, THE FOOD YOU BOUGHT JUST DIDN'T LAST AND YOU DIDN'T HAVE MONEY TO GET MORE.: PATIENT DECLINED

## 2024-08-06 SDOH — ECONOMIC STABILITY: HOUSING INSECURITY
IN THE LAST 12 MONTHS, WAS THERE A TIME WHEN YOU DID NOT HAVE A STEADY PLACE TO SLEEP OR SLEPT IN A SHELTER (INCLUDING NOW)?: PATIENT DECLINED

## 2024-08-06 SDOH — ECONOMIC STABILITY: INCOME INSECURITY: HOW HARD IS IT FOR YOU TO PAY FOR THE VERY BASICS LIKE FOOD, HOUSING, MEDICAL CARE, AND HEATING?: PATIENT DECLINED

## 2024-08-06 SDOH — ECONOMIC STABILITY: FOOD INSECURITY: WITHIN THE PAST 12 MONTHS, YOU WORRIED THAT YOUR FOOD WOULD RUN OUT BEFORE YOU GOT MONEY TO BUY MORE.: PATIENT DECLINED

## 2024-08-06 ASSESSMENT — ENCOUNTER SYMPTOMS
SHORTNESS OF BREATH: 0
BLOOD IN STOOL: 0
NAUSEA: 0
WHEEZING: 0
VOMITING: 0
CONSTIPATION: 0
DIARRHEA: 0
COUGH: 0

## 2024-08-06 NOTE — PROGRESS NOTES
8/6/2024 11:30 AM  Location:[facility] PHYSICIAN SERVICES  [facility] [place]INE  [place]  Patient #:  [id number]  YOB: 1960            History of Present Illness     Chief Complaint   Patient presents with    6 Month Follow-Up     6 month f/u     Medication Adjustment     She has stopped taking her Lexapro due to weight gain and started back on her Buspar 10 mg       Ms. Dumont is a 63 y.o. female  who presents for the above.   Notes that she has lost a couple pounds since stopping lexapro.  Has left hip and knee pain.  Wonders about switching to aciphex since it works for her sister.  Has been trouble sleeping since going off of lexapro.         Allergies   Allergen Reactions    Latex Rash    Heparin (Bovine) Anaphylaxis     Past Medical History:   Diagnosis Date    Anxiety and depression 12/27/2018    Arthritis of left knee 12/2/2020    Autoimmune disease (HCC)     fibromyalgia    CAD (coronary artery disease) 2006    mi    Chest pain     Coagulation defects     hx of dic ? pe    GERD (gastroesophageal reflux disease)     Hypertension     Palpitations 8/22/2016    Aug 2014 - K+ 3.4, Mg 2.1 7 day monitor - single 8 beat run of atrial ectopy, asymptomatic Feb 2016 - 7 day monitor - normal tracing, all symptoms with NSR    PUD (peptic ulcer disease)     Thromboembolus (HCC)     lle and pe's--had had foot surgery; just one time     Social History     Socioeconomic History    Marital status:      Spouse name: None    Number of children: None    Years of education: None    Highest education level: None   Tobacco Use    Smoking status: Never     Passive exposure: Never    Smokeless tobacco: Never   Substance and Sexual Activity    Alcohol use: No    Drug use: Never     Social Determinants of Health     Financial Resource Strain: Patient Declined (8/6/2024)    Overall Financial Resource Strain (CARDIA)     Difficulty of Paying Living Expenses: Patient declined   Food Insecurity: Patient

## 2024-08-07 NOTE — RESULT ENCOUNTER NOTE
Labs are stable except sugars are not as well controlled.  Maintain a low fat high fiber diet and make sure you are getting 30 minutes of aerobic exercise at least 4-5 days weekly.   Continue your current doses of medications. Keep up the good work.  Thanks.  [facility]

## 2024-08-13 ENCOUNTER — TELEPHONE (OUTPATIENT)
Dept: INTERNAL MEDICINE CLINIC | Facility: CLINIC | Age: 64
End: 2024-08-13

## 2024-08-13 NOTE — TELEPHONE ENCOUNTER
Patient called in this morning to check on the prior authorization for zepbound stated pharmacy told them it was not sent in. Patient would like a call back when done.

## 2024-08-13 NOTE — TELEPHONE ENCOUNTER
----- Message from Dr. Mona Stallings MD sent at 8/12/2024  5:40 PM EDT -----  Labs are stable except sugars are not as well controlled.  Maintain a low fat high fiber diet and make sure you are getting 30 minutes of aerobic exercise at least 4-5 days weekly.   Continue your current doses of medications. Keep up the good work.  Thanks.  [facility]

## 2024-08-15 NOTE — TELEPHONE ENCOUNTER
Pt called to check on the PA for the Zepbound. She's anxious to get started on this medication. Pt would like a call back at [phone] when the PA is completed.

## 2024-08-15 NOTE — TELEPHONE ENCOUNTER
Close reason: Other  Payer: [place] [employer] ([employer]) Case ID: [id number]  Note from payer: This medication may be excluded from the patient's benefit. For more information, please reach out to [employer] directly at [phone]. Message from [employer]: Drug is not covered by plan  View History      Pt notified

## 2024-09-03 ENCOUNTER — TELEPHONE (OUTPATIENT)
Dept: INTERNAL MEDICINE CLINIC | Facility: CLINIC | Age: 64
End: 2024-09-03

## 2024-09-11 ENCOUNTER — OFFICE VISIT (OUTPATIENT)
Dept: INTERNAL MEDICINE CLINIC | Facility: CLINIC | Age: 64
End: 2024-09-11
Payer: COMMERCIAL

## 2024-09-11 VITALS
HEIGHT: 67 IN | WEIGHT: 239.2 LBS | SYSTOLIC BLOOD PRESSURE: 116 MMHG | OXYGEN SATURATION: 100 % | DIASTOLIC BLOOD PRESSURE: 61 MMHG | HEART RATE: 91 BPM | RESPIRATION RATE: 18 BRPM | BODY MASS INDEX: 37.54 KG/M2 | TEMPERATURE: 97.5 F

## 2024-09-11 DIAGNOSIS — F32.A ANXIETY AND DEPRESSION: ICD-10-CM

## 2024-09-11 DIAGNOSIS — R30.0 DYSURIA: Primary | ICD-10-CM

## 2024-09-11 DIAGNOSIS — R30.0 DYSURIA: ICD-10-CM

## 2024-09-11 DIAGNOSIS — F41.9 ANXIETY AND DEPRESSION: ICD-10-CM

## 2024-09-11 DIAGNOSIS — R60.9 DEPENDENT EDEMA: ICD-10-CM

## 2024-09-11 LAB
APPEARANCE UR: ABNORMAL
BACTERIA URNS QL MICRO: ABNORMAL /HPF
BILIRUB UR QL: NEGATIVE
CASTS URNS QL MICRO: ABNORMAL /LPF
COLOR UR: ABNORMAL
EPI CELLS #/AREA URNS HPF: ABNORMAL /HPF
GLUCOSE UR STRIP.AUTO-MCNC: NEGATIVE MG/DL
HGB UR QL STRIP: ABNORMAL
KETONES UR QL STRIP.AUTO: NEGATIVE MG/DL
LEUKOCYTE ESTERASE UR QL STRIP.AUTO: ABNORMAL
NITRITE UR QL STRIP.AUTO: POSITIVE
OTHER OBSERVATIONS: ABNORMAL
PH UR STRIP: 5.5 (ref 5–9)
PROT UR STRIP-MCNC: NEGATIVE MG/DL
RBC #/AREA URNS HPF: ABNORMAL /HPF
SP GR UR REFRACTOMETRY: 1.01 (ref 1–1.02)
UROBILINOGEN UR QL STRIP.AUTO: 0.2 EU/DL (ref 0.2–1)
WBC URNS QL MICRO: >100 /HPF

## 2024-09-11 PROCEDURE — 99214 OFFICE O/P EST MOD 30 MIN: CPT | Performed by: NURSE PRACTITIONER

## 2024-09-11 PROCEDURE — 3074F SYST BP LT 130 MM HG: CPT | Performed by: NURSE PRACTITIONER

## 2024-09-11 PROCEDURE — 3078F DIAST BP <80 MM HG: CPT | Performed by: NURSE PRACTITIONER

## 2024-09-11 RX ORDER — FUROSEMIDE 40 MG
40 TABLET ORAL 2 TIMES DAILY
Qty: 60 TABLET | Refills: 0 | Status: SHIPPED | OUTPATIENT
Start: 2024-09-11

## 2024-09-11 RX ORDER — BUSPIRONE HYDROCHLORIDE 10 MG/1
10 TABLET ORAL 2 TIMES DAILY
Qty: 60 TABLET | Refills: 1 | Status: SHIPPED | OUTPATIENT
Start: 2024-09-11 | End: 2024-10-11

## 2024-09-11 RX ORDER — FUROSEMIDE 40 MG
40 TABLET ORAL 2 TIMES DAILY
COMMUNITY
End: 2024-09-11 | Stop reason: SDUPTHER

## 2024-09-12 ENCOUNTER — TELEPHONE (OUTPATIENT)
Dept: INTERNAL MEDICINE CLINIC | Facility: CLINIC | Age: 64
End: 2024-09-12

## 2024-09-12 DIAGNOSIS — N30.90 CYSTITIS: Primary | ICD-10-CM

## 2024-09-13 DIAGNOSIS — N30.90 CYSTITIS: Primary | ICD-10-CM

## 2024-09-13 LAB
BACTERIA SPEC CULT: ABNORMAL
BACTERIA SPEC CULT: ABNORMAL
SERVICE CMNT-IMP: ABNORMAL

## 2024-09-13 RX ORDER — NITROFURANTOIN 25; 75 MG/1; MG/1
100 CAPSULE ORAL 2 TIMES DAILY
Qty: 10 CAPSULE | Refills: 0 | Status: SHIPPED | OUTPATIENT
Start: 2024-09-13 | End: 2024-09-18

## 2024-09-15 ENCOUNTER — TELEPHONE (OUTPATIENT)
Dept: INTERNAL MEDICINE CLINIC | Facility: CLINIC | Age: 64
End: 2024-09-15

## 2024-10-09 ENCOUNTER — OFFICE VISIT (OUTPATIENT)
Dept: INTERNAL MEDICINE CLINIC | Facility: CLINIC | Age: 64
End: 2024-10-09
Payer: COMMERCIAL

## 2024-10-09 ENCOUNTER — TELEPHONE (OUTPATIENT)
Dept: INTERNAL MEDICINE CLINIC | Facility: CLINIC | Age: 64
End: 2024-10-09

## 2024-10-09 VITALS
DIASTOLIC BLOOD PRESSURE: 68 MMHG | OXYGEN SATURATION: 97 % | HEIGHT: 67 IN | WEIGHT: 238.2 LBS | TEMPERATURE: 97.3 F | HEART RATE: 88 BPM | SYSTOLIC BLOOD PRESSURE: 132 MMHG | BODY MASS INDEX: 37.39 KG/M2 | RESPIRATION RATE: 18 BRPM

## 2024-10-09 DIAGNOSIS — F32.A ANXIETY AND DEPRESSION: Primary | ICD-10-CM

## 2024-10-09 DIAGNOSIS — F41.9 ANXIETY AND DEPRESSION: Primary | ICD-10-CM

## 2024-10-09 PROCEDURE — 3078F DIAST BP <80 MM HG: CPT | Performed by: NURSE PRACTITIONER

## 2024-10-09 PROCEDURE — 3075F SYST BP GE 130 - 139MM HG: CPT | Performed by: NURSE PRACTITIONER

## 2024-10-09 PROCEDURE — 99214 OFFICE O/P EST MOD 30 MIN: CPT | Performed by: NURSE PRACTITIONER

## 2024-10-09 RX ORDER — BUSPIRONE HYDROCHLORIDE 30 MG/1
30 TABLET ORAL DAILY
Qty: 30 TABLET | Refills: 3 | Status: SHIPPED | OUTPATIENT
Start: 2024-10-09

## 2024-10-09 ASSESSMENT — PATIENT HEALTH QUESTIONNAIRE - PHQ9
5. POOR APPETITE OR OVEREATING: MORE THAN HALF THE DAYS
8. MOVING OR SPEAKING SO SLOWLY THAT OTHER PEOPLE COULD HAVE NOTICED. OR THE OPPOSITE, BEING SO FIGETY OR RESTLESS THAT YOU HAVE BEEN MOVING AROUND A LOT MORE THAN USUAL: NOT AT ALL
3. TROUBLE FALLING OR STAYING ASLEEP: NEARLY EVERY DAY
SUM OF ALL RESPONSES TO PHQ QUESTIONS 1-9: 8
6. FEELING BAD ABOUT YOURSELF - OR THAT YOU ARE A FAILURE OR HAVE LET YOURSELF OR YOUR FAMILY DOWN: NOT AT ALL
SUM OF ALL RESPONSES TO PHQ QUESTIONS 1-9: 8
1. LITTLE INTEREST OR PLEASURE IN DOING THINGS: NOT AT ALL
SUM OF ALL RESPONSES TO PHQ QUESTIONS 1-9: 8
2. FEELING DOWN, DEPRESSED OR HOPELESS: NOT AT ALL
7. TROUBLE CONCENTRATING ON THINGS, SUCH AS READING THE NEWSPAPER OR WATCHING TELEVISION: NOT AT ALL
SUM OF ALL RESPONSES TO PHQ9 QUESTIONS 1 & 2: 0
10. IF YOU CHECKED OFF ANY PROBLEMS, HOW DIFFICULT HAVE THESE PROBLEMS MADE IT FOR YOU TO DO YOUR WORK, TAKE CARE OF THINGS AT HOME, OR GET ALONG WITH OTHER PEOPLE: NOT DIFFICULT AT ALL
4. FEELING TIRED OR HAVING LITTLE ENERGY: NEARLY EVERY DAY
9. THOUGHTS THAT YOU WOULD BE BETTER OFF DEAD, OR OF HURTING YOURSELF: NOT AT ALL
SUM OF ALL RESPONSES TO PHQ QUESTIONS 1-9: 8

## 2024-10-09 ASSESSMENT — ANXIETY QUESTIONNAIRES
1. FEELING NERVOUS, ANXIOUS, OR ON EDGE: NEARLY EVERY DAY
3. WORRYING TOO MUCH ABOUT DIFFERENT THINGS: NOT AT ALL
4. TROUBLE RELAXING: SEVERAL DAYS
6. BECOMING EASILY ANNOYED OR IRRITABLE: SEVERAL DAYS
GAD7 TOTAL SCORE: 5
5. BEING SO RESTLESS THAT IT IS HARD TO SIT STILL: NOT AT ALL
IF YOU CHECKED OFF ANY PROBLEMS ON THIS QUESTIONNAIRE, HOW DIFFICULT HAVE THESE PROBLEMS MADE IT FOR YOU TO DO YOUR WORK, TAKE CARE OF THINGS AT HOME, OR GET ALONG WITH OTHER PEOPLE: NOT DIFFICULT AT ALL
7. FEELING AFRAID AS IF SOMETHING AWFUL MIGHT HAPPEN: NOT AT ALL
2. NOT BEING ABLE TO STOP OR CONTROL WORRYING: NOT AT ALL

## 2024-10-09 ASSESSMENT — ENCOUNTER SYMPTOMS: SHORTNESS OF BREATH: 0

## 2024-10-09 NOTE — PROGRESS NOTES
tablet 3    VORTIoxetine HBr (TRINTELLIX) 20 MG TABS tablet Take 1 tablet by mouth daily 90 tablet 3    potassium chloride (KLOR-CON M) 20 MEQ extended release tablet Take 1 tablet by mouth 2 times daily 180 tablet 3    metoprolol succinate (TOPROL XL) 25 MG extended release tablet TAKE 1 TABLET BY MOUTH TWICE DAILY 180 tablet 2    sucralfate (CARAFATE) 1 GM tablet Take 1 tablet by mouth 4 times daily (Patient taking differently: Take 1 tablet by mouth as needed) 120 tablet 3    bisacodyl (DULCOLAX) 5 MG EC tablet Take 1 tablet by mouth daily as needed for Constipation      Cholecalciferol (VITAMIN D3) 50 MCG (2000 UT) CAPS Take by mouth daily      NONFORMULARY D - mannose - 1500 mg once daily ( otc) for uti's      Misc Natural Products (GLUCOSAMINE CHOND CMP TRIPLE PO) Take by mouth daily      guaiFENesin (MUCINEX) 600 MG extended release tablet Take 2 tablets by mouth 2 times daily      acetaminophen (TYLENOL) 650 MG extended release tablet Take 4 tablets by mouth every morning 4 (650 mg) tablets every morning      aspirin 81 MG EC tablet Take 1 tablet by mouth daily       No current facility-administered medications for this visit.       1. Anxiety and depression  Continue Trintellix, consider GeneSight testing as she has been on multiple agents.  Lexapro sounds that it was the best for her but caused weight gain.  May need to consider going back on this.  Discussed checking thyroid, she declines.  Discussed behavioral counseling, she declines.  Will follow-up in 6 weeks, if BuSpar is not helpful we will look at GeneSight testing for further direction.  Knows to call for any new or concerning symptoms.  - busPIRone (BUSPAR) 30 MG tablet; Take 30 mg by mouth daily  Dispense: 30 tablet; Refill: 3  - Genesight Psychotropic (combinatorial pharmacogenomics test); Future          Jie Mojica, APRN - CNP

## 2024-10-09 NOTE — TELEPHONE ENCOUNTER
I ordered Telnexus testing for this patient.  Can we make sure this is done correctly?  Thanks so much!  Carine

## 2024-10-21 ENCOUNTER — TELEPHONE (OUTPATIENT)
Dept: INTERNAL MEDICINE CLINIC | Facility: CLINIC | Age: 64
End: 2024-10-21

## 2024-10-21 NOTE — TELEPHONE ENCOUNTER
Please schedule follow up to discuss gene sight testing with Ms. Dumont at her convenience.   Thanks!  Carine

## 2024-10-30 ENCOUNTER — TELEPHONE (OUTPATIENT)
Dept: INTERNAL MEDICINE CLINIC | Facility: CLINIC | Age: 64
End: 2024-10-30

## 2024-10-30 ENCOUNTER — OFFICE VISIT (OUTPATIENT)
Dept: INTERNAL MEDICINE CLINIC | Facility: CLINIC | Age: 64
End: 2024-10-30

## 2024-10-30 VITALS
HEIGHT: 67 IN | BODY MASS INDEX: 38.2 KG/M2 | WEIGHT: 243.4 LBS | OXYGEN SATURATION: 97 % | TEMPERATURE: 98.1 F | DIASTOLIC BLOOD PRESSURE: 58 MMHG | RESPIRATION RATE: 18 BRPM | HEART RATE: 99 BPM | SYSTOLIC BLOOD PRESSURE: 127 MMHG

## 2024-10-30 DIAGNOSIS — R30.0 DYSURIA: ICD-10-CM

## 2024-10-30 DIAGNOSIS — I10 PRIMARY HYPERTENSION: ICD-10-CM

## 2024-10-30 DIAGNOSIS — E78.2 MIXED HYPERLIPIDEMIA: ICD-10-CM

## 2024-10-30 DIAGNOSIS — G89.29 CHRONIC PAIN OF RIGHT ANKLE: ICD-10-CM

## 2024-10-30 DIAGNOSIS — G43.009 MIGRAINE WITHOUT AURA AND WITHOUT STATUS MIGRAINOSUS, NOT INTRACTABLE: ICD-10-CM

## 2024-10-30 DIAGNOSIS — M25.571 CHRONIC PAIN OF RIGHT ANKLE: ICD-10-CM

## 2024-10-30 DIAGNOSIS — F41.9 ANXIETY AND DEPRESSION: ICD-10-CM

## 2024-10-30 DIAGNOSIS — I25.10 CORONARY ARTERY DISEASE INVOLVING NATIVE HEART WITHOUT ANGINA PECTORIS, UNSPECIFIED VESSEL OR LESION TYPE: ICD-10-CM

## 2024-10-30 DIAGNOSIS — K21.9 GASTROESOPHAGEAL REFLUX DISEASE, UNSPECIFIED WHETHER ESOPHAGITIS PRESENT: ICD-10-CM

## 2024-10-30 DIAGNOSIS — M25.562 CHRONIC PAIN OF LEFT KNEE: ICD-10-CM

## 2024-10-30 DIAGNOSIS — R00.2 PALPITATIONS: ICD-10-CM

## 2024-10-30 DIAGNOSIS — F41.9 ANXIETY AND DEPRESSION: Primary | ICD-10-CM

## 2024-10-30 DIAGNOSIS — R60.9 DEPENDENT EDEMA: ICD-10-CM

## 2024-10-30 DIAGNOSIS — F32.A ANXIETY AND DEPRESSION: ICD-10-CM

## 2024-10-30 DIAGNOSIS — G89.29 CHRONIC PAIN OF LEFT KNEE: ICD-10-CM

## 2024-10-30 DIAGNOSIS — F32.A ANXIETY AND DEPRESSION: Primary | ICD-10-CM

## 2024-10-30 DIAGNOSIS — K58.1 IRRITABLE BOWEL SYNDROME WITH CONSTIPATION: ICD-10-CM

## 2024-10-30 DIAGNOSIS — D50.9 IRON DEFICIENCY ANEMIA, UNSPECIFIED IRON DEFICIENCY ANEMIA TYPE: Primary | ICD-10-CM

## 2024-10-30 LAB
25(OH)D3 SERPL-MCNC: 37 NG/ML (ref 30–100)
ANION GAP SERPL CALC-SCNC: 11 MMOL/L (ref 7–16)
APPEARANCE UR: CLEAR
BACTERIA URNS QL MICRO: ABNORMAL /HPF
BASOPHILS # BLD: 0.1 K/UL (ref 0–0.2)
BASOPHILS NFR BLD: 1 % (ref 0–2)
BILIRUB UR QL: NEGATIVE
BUN SERPL-MCNC: 11 MG/DL (ref 8–23)
CALCIUM SERPL-MCNC: 9.6 MG/DL (ref 8.8–10.2)
CHLORIDE SERPL-SCNC: 100 MMOL/L (ref 98–107)
CO2 SERPL-SCNC: 24 MMOL/L (ref 20–29)
COLOR UR: ABNORMAL
CREAT SERPL-MCNC: 0.86 MG/DL (ref 0.6–1.1)
DIFFERENTIAL METHOD BLD: ABNORMAL
EOSINOPHIL # BLD: 0.2 K/UL (ref 0–0.8)
EOSINOPHIL NFR BLD: 2 % (ref 0.5–7.8)
EPI CELLS #/AREA URNS HPF: ABNORMAL /HPF
ERYTHROCYTE [DISTWIDTH] IN BLOOD BY AUTOMATED COUNT: 17.6 % (ref 11.9–14.6)
GLUCOSE SERPL-MCNC: 144 MG/DL (ref 70–99)
GLUCOSE UR STRIP.AUTO-MCNC: NEGATIVE MG/DL
HCT VFR BLD AUTO: 31.2 % (ref 35.8–46.3)
HGB BLD-MCNC: 8.6 G/DL (ref 11.7–15.4)
HGB UR QL STRIP: NEGATIVE
IMM GRANULOCYTES # BLD AUTO: 0 K/UL (ref 0–0.5)
IMM GRANULOCYTES NFR BLD AUTO: 0 % (ref 0–5)
KETONES UR QL STRIP.AUTO: NEGATIVE MG/DL
LEUKOCYTE ESTERASE UR QL STRIP.AUTO: ABNORMAL
LYMPHOCYTES # BLD: 0.6 K/UL (ref 0.5–4.6)
LYMPHOCYTES NFR BLD: 5 % (ref 13–44)
MCH RBC QN AUTO: 20.5 PG (ref 26.1–32.9)
MCHC RBC AUTO-ENTMCNC: 27.6 G/DL (ref 31.4–35)
MCV RBC AUTO: 74.3 FL (ref 82–102)
MONOCYTES # BLD: 0.4 K/UL (ref 0.1–1.3)
MONOCYTES NFR BLD: 4 % (ref 4–12)
NEUTS SEG # BLD: 9.1 K/UL (ref 1.7–8.2)
NEUTS SEG NFR BLD: 88 % (ref 43–78)
NITRITE UR QL STRIP.AUTO: NEGATIVE
NRBC # BLD: 0 K/UL (ref 0–0.2)
OTHER OBSERVATIONS: ABNORMAL
PH UR STRIP: 6 (ref 5–9)
PLATELET # BLD AUTO: 218 K/UL (ref 150–450)
PMV BLD AUTO: 9.2 FL (ref 9.4–12.3)
POTASSIUM SERPL-SCNC: 3.9 MMOL/L (ref 3.5–5.1)
PROT UR STRIP-MCNC: NEGATIVE MG/DL
RBC # BLD AUTO: 4.2 M/UL (ref 4.05–5.2)
RBC #/AREA URNS HPF: ABNORMAL /HPF
SODIUM SERPL-SCNC: 135 MMOL/L (ref 136–145)
SP GR UR REFRACTOMETRY: 1.01 (ref 1–1.02)
TSH W FREE THYROID IF ABNORMAL: 1.31 UIU/ML (ref 0.27–4.2)
UROBILINOGEN UR QL STRIP.AUTO: 0.2 EU/DL (ref 0.2–1)
VIT B12 SERPL-MCNC: 387 PG/ML (ref 193–986)
WBC # BLD AUTO: 10.4 K/UL (ref 4.3–11.1)
WBC URNS QL MICRO: ABNORMAL /HPF

## 2024-10-30 RX ORDER — SUCRALFATE 1 G/1
1 TABLET ORAL 4 TIMES DAILY PRN
Qty: 30 TABLET | Refills: 3 | Status: SHIPPED | OUTPATIENT
Start: 2024-10-30

## 2024-10-30 RX ORDER — PANTOPRAZOLE SODIUM 40 MG/1
40 TABLET, DELAYED RELEASE ORAL
Qty: 180 TABLET | Refills: 1 | Status: SHIPPED | OUTPATIENT
Start: 2024-10-30

## 2024-10-30 RX ORDER — ROSUVASTATIN CALCIUM 5 MG/1
5 TABLET, COATED ORAL DAILY
Qty: 90 TABLET | Refills: 3 | Status: SHIPPED | OUTPATIENT
Start: 2024-10-30

## 2024-10-30 RX ORDER — VILAZODONE HYDROCHLORIDE 20 MG/1
20 TABLET ORAL DAILY
Qty: 30 TABLET | Refills: 3 | Status: SHIPPED | OUTPATIENT
Start: 2024-10-30

## 2024-10-30 RX ORDER — LORAZEPAM 0.5 MG/1
0.5 TABLET ORAL 3 TIMES DAILY PRN
COMMUNITY
End: 2024-10-30 | Stop reason: SDUPTHER

## 2024-10-30 RX ORDER — LISINOPRIL 20 MG/1
20 TABLET ORAL DAILY
Qty: 30 TABLET | Refills: 3 | Status: SHIPPED | OUTPATIENT
Start: 2024-10-30

## 2024-10-30 RX ORDER — METOPROLOL SUCCINATE 25 MG/1
TABLET, EXTENDED RELEASE ORAL
Qty: 180 TABLET | Refills: 2 | Status: SHIPPED | OUTPATIENT
Start: 2024-10-30

## 2024-10-30 RX ORDER — HYDROXYZINE PAMOATE 25 MG/1
25 CAPSULE ORAL 3 TIMES DAILY PRN
Qty: 30 CAPSULE | Refills: 3 | Status: SHIPPED | OUTPATIENT
Start: 2024-10-30

## 2024-10-30 RX ORDER — BUTALBITAL, ACETAMINOPHEN AND CAFFEINE 50; 325; 40 MG/1; MG/1; MG/1
1 TABLET ORAL EVERY 6 HOURS PRN
Qty: 30 TABLET | Refills: 0 | Status: SHIPPED | OUTPATIENT
Start: 2024-10-30

## 2024-10-30 RX ORDER — PANTOPRAZOLE SODIUM 40 MG/1
40 TABLET, DELAYED RELEASE ORAL DAILY
COMMUNITY
End: 2024-10-30 | Stop reason: SDUPTHER

## 2024-10-30 RX ORDER — LORAZEPAM 0.5 MG/1
0.5 TABLET ORAL 3 TIMES DAILY PRN
Qty: 30 TABLET | Refills: 3 | Status: SHIPPED | OUTPATIENT
Start: 2024-10-30 | End: 2025-02-27

## 2024-10-30 RX ORDER — CYCLOBENZAPRINE HCL 10 MG
TABLET ORAL
Qty: 30 TABLET | Refills: 3 | Status: SHIPPED | OUTPATIENT
Start: 2024-10-30

## 2024-10-30 RX ORDER — LINACLOTIDE 290 UG/1
290 CAPSULE, GELATIN COATED ORAL
Qty: 30 CAPSULE | Refills: 3 | Status: SHIPPED | OUTPATIENT
Start: 2024-10-30

## 2024-10-30 RX ORDER — POTASSIUM CHLORIDE 1500 MG/1
20 TABLET, EXTENDED RELEASE ORAL 2 TIMES DAILY
Qty: 180 TABLET | Refills: 3 | Status: SHIPPED | OUTPATIENT
Start: 2024-10-30

## 2024-10-30 RX ORDER — HYDROXYZINE PAMOATE 25 MG/1
25 CAPSULE ORAL 3 TIMES DAILY PRN
COMMUNITY
End: 2024-10-30 | Stop reason: SDUPTHER

## 2024-10-30 RX ORDER — FERROUS SULFATE 325(65) MG
325 TABLET ORAL
Qty: 90 TABLET | Refills: 1 | Status: SHIPPED | OUTPATIENT
Start: 2024-10-30

## 2024-10-30 RX ORDER — FUROSEMIDE 40 MG/1
40 TABLET ORAL 2 TIMES DAILY PRN
Qty: 60 TABLET | Refills: 0 | Status: SHIPPED | OUTPATIENT
Start: 2024-10-30

## 2024-10-30 RX ORDER — PANTOPRAZOLE SODIUM 40 MG/1
40 TABLET, DELAYED RELEASE ORAL DAILY
Qty: 30 TABLET | Refills: 3 | Status: SHIPPED | OUTPATIENT
Start: 2024-10-30 | End: 2024-10-30 | Stop reason: ALTCHOICE

## 2024-10-30 RX ORDER — CLOPIDOGREL BISULFATE 75 MG/1
75 TABLET ORAL DAILY
Qty: 90 TABLET | Refills: 3 | Status: SHIPPED | OUTPATIENT
Start: 2024-10-30

## 2024-10-30 RX ORDER — LINACLOTIDE 290 UG/1
290 CAPSULE, GELATIN COATED ORAL
COMMUNITY
End: 2024-10-30 | Stop reason: SDUPTHER

## 2024-10-30 RX ORDER — FAMOTIDINE 20 MG/1
20 TABLET, FILM COATED ORAL EVERY EVENING
Qty: 90 TABLET | Refills: 3 | Status: SHIPPED | OUTPATIENT
Start: 2024-10-30

## 2024-10-30 ASSESSMENT — ENCOUNTER SYMPTOMS
VOMITING: 0
ABDOMINAL PAIN: 1
NAUSEA: 0
SHORTNESS OF BREATH: 0

## 2024-10-30 NOTE — PROGRESS NOTES
10/30/2024 10:59 AM  Location:[facility] PHYSICIAN SERVICES  [facility] [place] MEDICINE  [place]  Patient #:  [id number]  YOB: 1960          YOUR LAST HEMOGLOBIN A1CS:   No results found for: \"HBA1C\", \"[id number]\"    YOUR LAST LIPID PROFILE:   Lab Results   Component Value Date/Time    CHOL 139 08/06/2024 11:50 AM    HDL 48 08/06/2024 11:50 AM    LDL 66 08/06/2024 11:50 AM    LDL 54.4 01/25/2024 11:24 AM    VLDL 25 08/06/2024 11:50 AM    VLDL 24 11/24/2021 11:34 AM         Lab Results   Component Value Date/Time    GFRAA >60 09/14/2022 12:00 PM    BUN 11 08/06/2024 11:50 AM     08/06/2024 11:50 AM    K 3.8 08/06/2024 11:50 AM     08/06/2024 11:50 AM    CO2 26 08/06/2024 11:50 AM           History of Present Illness     Chief Complaint   Patient presents with    Results     Patient presents in the office today to discuss gene sight testing results.    Dysuria     Patient c/o dysuria, foul odor and cloudiness of urine x 1 week.    Medication Refill     Patient also needs medication refills.       Ms. Dumont is a 64 y.o. female  who presents for the above mentioned complaints.  Ms. Dumont presents for follow-up after upward titration of BuSpar.  In the interim, she has done GeneSight testing.  She is on Trintellix which is showing significant gene drug interaction.  BuSpar showing no interaction.    She reports since increasing BuSpar her symptoms have worsened.  Is crying, depressed and gets agitated easily.  Is trying to watch her grandchildren but feels agitated.  Denies suicidal homicidal ideations, auditory or visual hallucinations.    She also needs refills of all her medications.  She has a history of CAD followed by cardiology on Plavix, IBS, GERD, arthritis, hyperlipidemia.    Reports possible UTI type symptoms.  Denies other physical symptoms besides worsening anxiety.           Allergies   Allergen Reactions    Latex Rash    Heparin (Bovine) Anaphylaxis     Past Medical History:

## 2024-10-30 NOTE — PATIENT INSTRUCTIONS
Decrease Trintellix for the next week to half tablet and begin half tablet daily of Viibryd.   After 1 week, stop Trintellix and take a full Viibryd.

## 2024-10-31 ENCOUNTER — TELEPHONE (OUTPATIENT)
Dept: INTERNAL MEDICINE CLINIC | Facility: CLINIC | Age: 64
End: 2024-10-31

## 2024-10-31 DIAGNOSIS — D50.9 IRON DEFICIENCY ANEMIA, UNSPECIFIED IRON DEFICIENCY ANEMIA TYPE: ICD-10-CM

## 2024-10-31 LAB
FERRITIN SERPL-MCNC: 11 NG/ML (ref 8–388)
IRON SATN MFR SERPL: 5 % (ref 20–50)
IRON SERPL-MCNC: 19 UG/DL (ref 35–100)
TIBC SERPL-MCNC: 361 UG/DL (ref 240–450)
UIBC SERPL-MCNC: 342 UG/DL (ref 112–347)

## 2024-10-31 RX ORDER — NITROFURANTOIN 25; 75 MG/1; MG/1
100 CAPSULE ORAL 2 TIMES DAILY
Qty: 10 CAPSULE | Refills: 0 | Status: SHIPPED | OUTPATIENT
Start: 2024-10-31 | End: 2024-11-01 | Stop reason: ALTCHOICE

## 2024-10-31 NOTE — TELEPHONE ENCOUNTER
See other telephone encounter.     Patient has been informed and verbalized understanding.  FIT test has been placed at , patient states she will pick it up today or tomorrow.   Add on lab request sent.

## 2024-10-31 NOTE — TELEPHONE ENCOUNTER
I called and spoke with patient regarding labs. She will begin iron and increase protonix to bid. Please leave FIT test up front and call her and make sure she comes to get this today.     I have also called in Macrobid for UTI, awaiting cx.   She denies dark stool but reports UTI sx,knows to call for new/concerning sx.

## 2024-10-31 NOTE — TELEPHONE ENCOUNTER
Ms. Moncadaley,  The labs show that your hemoglobin is down to 8.6.   Have you had any dark stools?     Please come fill out a FIT card and I am adding on some more labs to assess the extent of the anemia.( Added ferritin, iron and tibc, see if we can add on)    Increase protonix to twice daily for now. Begin taking oral iron daily.   I will be in touch when I get the other results.   Do you have a GI that you can see?   Carine

## 2024-11-01 ENCOUNTER — TELEPHONE (OUTPATIENT)
Dept: INTERNAL MEDICINE CLINIC | Facility: CLINIC | Age: 64
End: 2024-11-01

## 2024-11-01 DIAGNOSIS — N30.90 CYSTITIS: Primary | ICD-10-CM

## 2024-11-01 LAB
BACTERIA SPEC CULT: ABNORMAL
BACTERIA SPEC CULT: ABNORMAL
SERVICE CMNT-IMP: ABNORMAL

## 2024-11-01 RX ORDER — CEPHALEXIN 500 MG/1
500 CAPSULE ORAL 2 TIMES DAILY
Qty: 14 CAPSULE | Refills: 0 | Status: SHIPPED | OUTPATIENT
Start: 2024-11-01 | End: 2024-11-08

## 2024-11-01 NOTE — TELEPHONE ENCOUNTER
Please call patient and let her know that the urine culture came back and may not be responsive to Macrobid so I called in Keflex.   Stop Macrobid, start Keflex.  Please let us know if you develop  any new or worsening sx.  Carine

## 2024-11-01 NOTE — TELEPHONE ENCOUNTER
Ms. Dumont would like a copy of the gene site screening be sent to her please. Or she said that she would come by and pick it up if she can get a copy of it.

## 2024-11-04 ENCOUNTER — TELEPHONE (OUTPATIENT)
Dept: INTERNAL MEDICINE CLINIC | Facility: CLINIC | Age: 64
End: 2024-11-04

## 2024-11-04 NOTE — TELEPHONE ENCOUNTER
Patient called in and reports positional dizziness. Had a higher bp reading during this episode.  Med changes include starting iron, changing trintellix to viibryd.    Discussed slow position change.  Discussed going to the ER for acute worsening symptoms, hematochezia, melena, syncope.    She will check blood pressures and heart rates if dizzy.  Not having dark stools, turned in FIT testing this morning.  Is taking iron with vitamin C.

## 2024-11-05 ENCOUNTER — TELEPHONE (OUTPATIENT)
Dept: INTERNAL MEDICINE CLINIC | Facility: CLINIC | Age: 64
End: 2024-11-05

## 2024-11-05 DIAGNOSIS — R19.5 POSITIVE FIT (FECAL IMMUNOCHEMICAL TEST): Primary | ICD-10-CM

## 2024-11-05 DIAGNOSIS — D50.0 IRON DEFICIENCY ANEMIA DUE TO CHRONIC BLOOD LOSS: ICD-10-CM

## 2024-11-05 LAB
HEMOCCULT STL QL: POSITIVE
VALID INTERNAL CONTROL: YES

## 2024-11-05 NOTE — TELEPHONE ENCOUNTER
Ms. Dumont-  The FIT test is positive, so I have urgently referred you to see the gastroenterologist(Dr. Paul).   Let's recheck hgb in the next day or so(please schedule at her convenience)  Continue iron, protonix twice daily.   Please let us know if you develop  any new or worsening sx.  Carine

## 2024-11-05 NOTE — TELEPHONE ENCOUNTER
Patient informed and verbalized understanding.  Lab appointment scheduled for tomorrow (11/6/24).

## 2024-11-06 DIAGNOSIS — D50.0 IRON DEFICIENCY ANEMIA DUE TO CHRONIC BLOOD LOSS: ICD-10-CM

## 2024-11-06 DIAGNOSIS — R19.5 POSITIVE FIT (FECAL IMMUNOCHEMICAL TEST): ICD-10-CM

## 2024-11-06 LAB
BASOPHILS # BLD: 0.1 K/UL (ref 0–0.2)
BASOPHILS NFR BLD: 1 % (ref 0–2)
DIFFERENTIAL METHOD BLD: ABNORMAL
EOSINOPHIL # BLD: 0.3 K/UL (ref 0–0.8)
EOSINOPHIL NFR BLD: 5 % (ref 0.5–7.8)
ERYTHROCYTE [DISTWIDTH] IN BLOOD BY AUTOMATED COUNT: 19.9 % (ref 11.9–14.6)
HCT VFR BLD AUTO: 32.5 % (ref 35.8–46.3)
HGB BLD-MCNC: 9.1 G/DL (ref 11.7–15.4)
IMM GRANULOCYTES # BLD AUTO: 0.1 K/UL (ref 0–0.5)
IMM GRANULOCYTES NFR BLD AUTO: 1 % (ref 0–5)
LYMPHOCYTES # BLD: 1.6 K/UL (ref 0.5–4.6)
LYMPHOCYTES NFR BLD: 24 % (ref 13–44)
MCH RBC QN AUTO: 21.3 PG (ref 26.1–32.9)
MCHC RBC AUTO-ENTMCNC: 28 G/DL (ref 31.4–35)
MCV RBC AUTO: 76.1 FL (ref 82–102)
MONOCYTES # BLD: 0.5 K/UL (ref 0.1–1.3)
MONOCYTES NFR BLD: 7 % (ref 4–12)
NEUTS SEG # BLD: 4.3 K/UL (ref 1.7–8.2)
NEUTS SEG NFR BLD: 63 % (ref 43–78)
NRBC # BLD: 0 K/UL (ref 0–0.2)
PLATELET # BLD AUTO: 265 K/UL (ref 150–450)
PMV BLD AUTO: 9.4 FL (ref 9.4–12.3)
RBC # BLD AUTO: 4.27 M/UL (ref 4.05–5.2)
WBC # BLD AUTO: 6.9 K/UL (ref 4.3–11.1)

## 2024-11-07 ENCOUNTER — TELEPHONE (OUTPATIENT)
Dept: INTERNAL MEDICINE CLINIC | Facility: CLINIC | Age: 64
End: 2024-11-07

## 2024-11-07 NOTE — TELEPHONE ENCOUNTER
Ms. Dumont wants Emma to call her at her convenience..  she wants to hear about her labs and she is severe anemic. Wants a call back sometime tomorrow. She is needing to be off of plavix for 5 days before having a colonoscopy.  Wants to give Emma a update on the doctor's visit today.

## 2024-11-08 ENCOUNTER — TELEPHONE (OUTPATIENT)
Age: 64
End: 2024-11-08

## 2024-11-08 DIAGNOSIS — F41.9 ANXIETY AND DEPRESSION: ICD-10-CM

## 2024-11-08 DIAGNOSIS — F32.A ANXIETY AND DEPRESSION: ICD-10-CM

## 2024-11-08 NOTE — TELEPHONE ENCOUNTER
Cardiac Clearance        Physician or Practice Requesting:  [facility]   : [place]  Contact Phone Number: [phone]  Fax Number: [phone]  Date of Surgery/Procedure: 11/12/24   Type of Surgery or Procedure: EGD/colon  Type of Anesthesia: propofanol  Type of Clearance Requested: Cardiac Clearance and Medication Hold  Medication to Hold:plavix  Days to Hold: 5days        Its a stat procedure

## 2024-11-18 RX ORDER — BUSPIRONE HYDROCHLORIDE 10 MG/1
10 TABLET ORAL 3 TIMES DAILY
Qty: 270 TABLET | Refills: 3 | Status: SHIPPED | OUTPATIENT
Start: 2024-11-18 | End: 2024-11-20 | Stop reason: SDUPTHER

## 2024-11-20 ENCOUNTER — OFFICE VISIT (OUTPATIENT)
Dept: INTERNAL MEDICINE CLINIC | Facility: CLINIC | Age: 64
End: 2024-11-20
Payer: COMMERCIAL

## 2024-11-20 VITALS
HEIGHT: 67 IN | DIASTOLIC BLOOD PRESSURE: 62 MMHG | SYSTOLIC BLOOD PRESSURE: 125 MMHG | BODY MASS INDEX: 37.51 KG/M2 | TEMPERATURE: 97.3 F | OXYGEN SATURATION: 99 % | WEIGHT: 239 LBS | RESPIRATION RATE: 18 BRPM | HEART RATE: 82 BPM

## 2024-11-20 DIAGNOSIS — M15.9 GENERALIZED OSTEOARTHRITIS: Primary | ICD-10-CM

## 2024-11-20 DIAGNOSIS — E87.1 HYPONATREMIA: ICD-10-CM

## 2024-11-20 DIAGNOSIS — F41.9 ANXIETY AND DEPRESSION: ICD-10-CM

## 2024-11-20 DIAGNOSIS — R73.09 ELEVATED GLUCOSE: ICD-10-CM

## 2024-11-20 DIAGNOSIS — D50.0 IRON DEFICIENCY ANEMIA DUE TO CHRONIC BLOOD LOSS: ICD-10-CM

## 2024-11-20 DIAGNOSIS — G89.4 CHRONIC PAIN SYNDROME: ICD-10-CM

## 2024-11-20 DIAGNOSIS — F32.A ANXIETY AND DEPRESSION: ICD-10-CM

## 2024-11-20 DIAGNOSIS — M15.9 GENERALIZED OSTEOARTHRITIS: ICD-10-CM

## 2024-11-20 LAB
ALBUMIN SERPL-MCNC: 4.5 G/DL (ref 3.2–4.6)
ALBUMIN/GLOB SERPL: 1.6 (ref 1–1.9)
ALP SERPL-CCNC: 98 U/L (ref 35–104)
ALT SERPL-CCNC: 24 U/L (ref 8–45)
ANION GAP SERPL CALC-SCNC: 11 MMOL/L (ref 7–16)
AST SERPL-CCNC: 25 U/L (ref 15–37)
BASOPHILS # BLD: 0.1 K/UL (ref 0–0.2)
BASOPHILS NFR BLD: 2 % (ref 0–2)
BILIRUB SERPL-MCNC: 0.4 MG/DL (ref 0–1.2)
BUN SERPL-MCNC: 9 MG/DL (ref 8–23)
CALCIUM SERPL-MCNC: 10.3 MG/DL (ref 8.8–10.2)
CHLORIDE SERPL-SCNC: 105 MMOL/L (ref 98–107)
CO2 SERPL-SCNC: 24 MMOL/L (ref 20–29)
CREAT SERPL-MCNC: 0.78 MG/DL (ref 0.6–1.1)
CRP SERPL-MCNC: <0.3 MG/DL (ref 0–0.4)
DIFFERENTIAL METHOD BLD: ABNORMAL
EOSINOPHIL # BLD: 0.3 K/UL (ref 0–0.8)
EOSINOPHIL NFR BLD: 4 % (ref 0.5–7.8)
ERYTHROCYTE [DISTWIDTH] IN BLOOD BY AUTOMATED COUNT: 22.5 % (ref 11.9–14.6)
ERYTHROCYTE [SEDIMENTATION RATE] IN BLOOD: 15 MM/HR (ref 0–30)
EST. AVERAGE GLUCOSE BLD GHB EST-MCNC: 122 MG/DL
GLOBULIN SER CALC-MCNC: 2.8 G/DL (ref 2.3–3.5)
GLUCOSE SERPL-MCNC: 110 MG/DL (ref 70–99)
HBA1C MFR BLD: 5.9 % (ref 0–5.6)
HCT VFR BLD AUTO: 38.2 % (ref 35.8–46.3)
HGB BLD-MCNC: 10.7 G/DL (ref 11.7–15.4)
IMM GRANULOCYTES # BLD AUTO: 0 K/UL (ref 0–0.5)
IMM GRANULOCYTES NFR BLD AUTO: 1 % (ref 0–5)
LYMPHOCYTES # BLD: 1.8 K/UL (ref 0.5–4.6)
LYMPHOCYTES NFR BLD: 28 % (ref 13–44)
MCH RBC QN AUTO: 22.8 PG (ref 26.1–32.9)
MCHC RBC AUTO-ENTMCNC: 28 G/DL (ref 31.4–35)
MCV RBC AUTO: 81.3 FL (ref 82–102)
MONOCYTES # BLD: 0.6 K/UL (ref 0.1–1.3)
MONOCYTES NFR BLD: 9 % (ref 4–12)
NEUTS SEG # BLD: 3.8 K/UL (ref 1.7–8.2)
NEUTS SEG NFR BLD: 57 % (ref 43–78)
NRBC # BLD: 0 K/UL (ref 0–0.2)
PLATELET # BLD AUTO: 305 K/UL (ref 150–450)
PMV BLD AUTO: 9.6 FL (ref 9.4–12.3)
POTASSIUM SERPL-SCNC: 4.2 MMOL/L (ref 3.5–5.1)
PROT SERPL-MCNC: 7.2 G/DL (ref 6.3–8.2)
RBC # BLD AUTO: 4.7 M/UL (ref 4.05–5.2)
SODIUM SERPL-SCNC: 140 MMOL/L (ref 136–145)
WBC # BLD AUTO: 6.6 K/UL (ref 4.3–11.1)

## 2024-11-20 PROCEDURE — 3078F DIAST BP <80 MM HG: CPT | Performed by: NURSE PRACTITIONER

## 2024-11-20 PROCEDURE — 3074F SYST BP LT 130 MM HG: CPT | Performed by: NURSE PRACTITIONER

## 2024-11-20 PROCEDURE — 99215 OFFICE O/P EST HI 40 MIN: CPT | Performed by: NURSE PRACTITIONER

## 2024-11-20 RX ORDER — BUSPIRONE HYDROCHLORIDE 10 MG/1
10 TABLET ORAL 3 TIMES DAILY
Qty: 270 TABLET | Refills: 3
Start: 2024-11-20

## 2024-11-20 RX ORDER — DICLOFENAC SODIUM AND MISOPROSTOL 50; 200 MG/1; UG/1
1 TABLET, DELAYED RELEASE ORAL 2 TIMES DAILY
Qty: 60 TABLET | Refills: 3 | Status: SHIPPED | OUTPATIENT
Start: 2024-11-20

## 2024-11-20 ASSESSMENT — PATIENT HEALTH QUESTIONNAIRE - PHQ9
1. LITTLE INTEREST OR PLEASURE IN DOING THINGS: SEVERAL DAYS
8. MOVING OR SPEAKING SO SLOWLY THAT OTHER PEOPLE COULD HAVE NOTICED. OR THE OPPOSITE, BEING SO FIGETY OR RESTLESS THAT YOU HAVE BEEN MOVING AROUND A LOT MORE THAN USUAL: NOT AT ALL
2. FEELING DOWN, DEPRESSED OR HOPELESS: MORE THAN HALF THE DAYS
10. IF YOU CHECKED OFF ANY PROBLEMS, HOW DIFFICULT HAVE THESE PROBLEMS MADE IT FOR YOU TO DO YOUR WORK, TAKE CARE OF THINGS AT HOME, OR GET ALONG WITH OTHER PEOPLE: SOMEWHAT DIFFICULT
3. TROUBLE FALLING OR STAYING ASLEEP: MORE THAN HALF THE DAYS
5. POOR APPETITE OR OVEREATING: NOT AT ALL
4. FEELING TIRED OR HAVING LITTLE ENERGY: NEARLY EVERY DAY
SUM OF ALL RESPONSES TO PHQ QUESTIONS 1-9: 9
SUM OF ALL RESPONSES TO PHQ QUESTIONS 1-9: 9
SUM OF ALL RESPONSES TO PHQ9 QUESTIONS 1 & 2: 3
7. TROUBLE CONCENTRATING ON THINGS, SUCH AS READING THE NEWSPAPER OR WATCHING TELEVISION: NOT AT ALL
6. FEELING BAD ABOUT YOURSELF - OR THAT YOU ARE A FAILURE OR HAVE LET YOURSELF OR YOUR FAMILY DOWN: SEVERAL DAYS
9. THOUGHTS THAT YOU WOULD BE BETTER OFF DEAD, OR OF HURTING YOURSELF: NOT AT ALL
SUM OF ALL RESPONSES TO PHQ QUESTIONS 1-9: 9
SUM OF ALL RESPONSES TO PHQ QUESTIONS 1-9: 9

## 2024-11-20 ASSESSMENT — ENCOUNTER SYMPTOMS
CONSTIPATION: 0
DIARRHEA: 0
NAUSEA: 0
BLOOD IN STOOL: 0
SHORTNESS OF BREATH: 0
VOMITING: 0

## 2024-11-20 NOTE — PROGRESS NOTES
11/20/2024 10:40 AM  Location:[facility] PHYSICIAN SERVICES  [facility] [place]ICINE  [place]  Patient #:  [id number]  YOB: 1960              History of Present Illness     Chief Complaint   Patient presents with    Discuss Medications     Patient presents in the office today for a 6 week follow up on medication changes       Ms. Dumont is a 64 y.o. female  who presents for the above mentioned complaints.  Ms. Beasley presents for follow-up.  She was changed from Trintellix to Viibryd based on GeneSight testing.  She was also found to have anemia, iron and Protonix was started and she was referred to GI.  Has had egd and colonoscopy, getting a capsule endoscopy. Continues to take iron, PPI and Pepcid.    Reports continued severe osteoarthritis pain.  Has trialed multiple medications and was told to stop NSAIDs.  Is tearful when speaking about her severe pain, feels that this limits her life.  Has not to her knowledge been diagnosed of been checked for rheumatologic or autoimmune disorders.  Has in the past trialed Cymbalta which she did not tolerate.  When she stopped NSAIDs she feels that her depression has worsened.    Is here with her daughter this morning who is a pharmacist.             Allergies   Allergen Reactions    Latex Rash    Heparin (Bovine) Anaphylaxis     Past Medical History:   Diagnosis Date    Anxiety and depression 12/27/2018    Arthritis of left knee 12/2/2020    Autoimmune disease (HCC)     fibromyalgia    CAD (coronary artery disease) 2006    mi    Chest pain     Coagulation defects     hx of dic ? pe    GERD (gastroesophageal reflux disease)     Hypertension     Palpitations 8/22/2016    Aug 2014 - K+ 3.4, Mg 2.1 7 day monitor - single 8 beat run of atrial ectopy, asymptomatic Feb 2016 - 7 day monitor - normal tracing, all symptoms with NSR    PUD (peptic ulcer disease)     Thromboembolus (HCC)     lle and pe's--had had foot surgery; just one time     Social History

## 2024-11-21 LAB
ANA SER QL: NEGATIVE
RHEUMATOID FACT SER QL LA: NEGATIVE

## 2024-11-24 ENCOUNTER — TELEPHONE (OUTPATIENT)
Dept: INTERNAL MEDICINE CLINIC | Facility: CLINIC | Age: 64
End: 2024-11-24

## 2024-11-24 NOTE — TELEPHONE ENCOUNTER
I sent the message below via my chart, patient has not read the following message, please relay. Thanks!    Ms. Dumont,  The labs so far look great with the hemoglobin up to over 10. You are still in a prediabetic range so keep working on nourishing and healing your body with healthy and unprocessed foods and minimizing sugar and daily movement.   the labs show no elevated inflammatory markers and are negative for autoimmune disorders or rheumatoid arthritis.   Hopeful that the arthrotec will help.      Here if you need anything!  Carine  [address]    [employer] User Last Read On   Karolina Dumont Not Read

## 2024-12-18 ENCOUNTER — OFFICE VISIT (OUTPATIENT)
Dept: INTERNAL MEDICINE CLINIC | Facility: CLINIC | Age: 64
End: 2024-12-18
Payer: COMMERCIAL

## 2024-12-18 VITALS
SYSTOLIC BLOOD PRESSURE: 128 MMHG | HEIGHT: 67 IN | DIASTOLIC BLOOD PRESSURE: 48 MMHG | WEIGHT: 239.4 LBS | HEART RATE: 71 BPM | TEMPERATURE: 97.5 F | BODY MASS INDEX: 37.57 KG/M2 | OXYGEN SATURATION: 96 % | RESPIRATION RATE: 18 BRPM

## 2024-12-18 DIAGNOSIS — G89.4 CHRONIC PAIN SYNDROME: ICD-10-CM

## 2024-12-18 DIAGNOSIS — F41.9 ANXIETY AND DEPRESSION: Primary | ICD-10-CM

## 2024-12-18 DIAGNOSIS — F32.A ANXIETY AND DEPRESSION: Primary | ICD-10-CM

## 2024-12-18 PROCEDURE — 3078F DIAST BP <80 MM HG: CPT | Performed by: NURSE PRACTITIONER

## 2024-12-18 PROCEDURE — 99214 OFFICE O/P EST MOD 30 MIN: CPT | Performed by: NURSE PRACTITIONER

## 2024-12-18 PROCEDURE — 3074F SYST BP LT 130 MM HG: CPT | Performed by: NURSE PRACTITIONER

## 2024-12-18 RX ORDER — VILAZODONE HYDROCHLORIDE 40 MG/1
40 TABLET ORAL DAILY
Qty: 30 TABLET | Refills: 3 | Status: SHIPPED | OUTPATIENT
Start: 2024-12-18

## 2024-12-18 ASSESSMENT — ENCOUNTER SYMPTOMS
VOMITING: 0
DIARRHEA: 0
ABDOMINAL PAIN: 0
NAUSEA: 0
CONSTIPATION: 0
BLOOD IN STOOL: 0

## 2024-12-18 NOTE — PROGRESS NOTES
Drug use: Never    Sexual activity: Yes     Partners: Male     Social Determinants of Health     Financial Resource Strain: Patient Declined (8/6/2024)    Overall Financial Resource Strain (CARDIA)     Difficulty of Paying Living Expenses: Patient declined   Food Insecurity: Patient Declined (8/6/2024)    Hunger Vital Sign     Worried About Running Out of Food in the Last Year: Patient declined     Ran Out of Food in the Last Year: Patient declined   Transportation Needs: Unknown (8/6/2024)    PRAPARE - Transportation     Lack of Transportation (Non-Medical): Patient declined   Social Connections: Unknown (3/19/2021)    Received from [facility]    Social Connections     Frequency of Communication with Friends and Family: Not asked     Frequency of Social Gatherings with Friends and Family: Not asked   Intimate Partner Violence: Unknown (3/19/2021)    Received from [employer]    Intimate Partner Violence     Fear of Current or Ex-Partner: Not asked     Emotionally Abused: Not asked     Physically Abused: Not asked     Sexually Abused: Not asked   Housing Stability: Unknown (8/6/2024)    Housing Stability Vital Sign     Unstable Housing in the Last Year: Patient declined     Past Surgical History:   Procedure Laterality Date    CARDIAC CATHETERIZATION      CHOLECYSTECTOMY  2002    HEENT      septoplasty,rhinoplasty    [place] UNLISTED PROCEDURE CARDIAC SURGERY      cath stents    UPPER GASTROINTESTINAL ENDOSCOPY  11/24     Current Outpatient Medications   Medication Sig Dispense Refill    busPIRone (BUSPAR) 10 MG tablet Take 1 tablet by mouth 3 times daily 2 in the morning and 1 in the evening 270 tablet 3    diclofenac-miSOPROStol (ARTHROTEC) 50-0.2 MG per tablet Take 1 tablet by mouth 2 times daily 60 tablet 3    metoprolol succinate (TOPROL XL) 25 MG extended release tablet TAKE 1 TABLET BY MOUTH TWICE DAILY 180 tablet 2    linaclotide (LINZESS) 290 MCG CAPS capsule Take 1 capsule by

## 2025-01-02 ENCOUNTER — OFFICE VISIT (OUTPATIENT)
Dept: INTERNAL MEDICINE CLINIC | Facility: CLINIC | Age: 65
End: 2025-01-02
Payer: COMMERCIAL

## 2025-01-02 VITALS
OXYGEN SATURATION: 96 % | HEIGHT: 67 IN | HEART RATE: 74 BPM | SYSTOLIC BLOOD PRESSURE: 126 MMHG | DIASTOLIC BLOOD PRESSURE: 70 MMHG | BODY MASS INDEX: 38.14 KG/M2 | WEIGHT: 243 LBS | TEMPERATURE: 97.3 F

## 2025-01-02 DIAGNOSIS — R05.9 COUGH, UNSPECIFIED TYPE: Primary | ICD-10-CM

## 2025-01-02 DIAGNOSIS — H66.004 RECURRENT ACUTE SUPPURATIVE OTITIS MEDIA OF RIGHT EAR WITHOUT SPONTANEOUS RUPTURE OF TYMPANIC MEMBRANE: ICD-10-CM

## 2025-01-02 LAB
EXP DATE SOLUTION: NORMAL
EXP DATE SWAB: NORMAL
EXPIRATION DATE: NORMAL
INFLUENZA A ANTIGEN, POC: NEGATIVE
INFLUENZA B ANTIGEN, POC: NEGATIVE
LOT NUMBER POC: NORMAL
LOT NUMBER SOLUTION: NORMAL
LOT NUMBER SWAB: NORMAL
SARS-COV-2 RNA, POC: NEGATIVE
VALID INTERNAL CONTROL, POC: NORMAL

## 2025-01-02 PROCEDURE — 87804 INFLUENZA ASSAY W/OPTIC: CPT | Performed by: INTERNAL MEDICINE

## 2025-01-02 PROCEDURE — 87635 SARS-COV-2 COVID-19 AMP PRB: CPT | Performed by: INTERNAL MEDICINE

## 2025-01-02 PROCEDURE — 3074F SYST BP LT 130 MM HG: CPT | Performed by: INTERNAL MEDICINE

## 2025-01-02 PROCEDURE — 99213 OFFICE O/P EST LOW 20 MIN: CPT | Performed by: INTERNAL MEDICINE

## 2025-01-02 PROCEDURE — 3078F DIAST BP <80 MM HG: CPT | Performed by: INTERNAL MEDICINE

## 2025-01-02 ASSESSMENT — PATIENT HEALTH QUESTIONNAIRE - PHQ9
9. THOUGHTS THAT YOU WOULD BE BETTER OFF DEAD, OR OF HURTING YOURSELF: NOT AT ALL
SUM OF ALL RESPONSES TO PHQ QUESTIONS 1-9: 5
6. FEELING BAD ABOUT YOURSELF - OR THAT YOU ARE A FAILURE OR HAVE LET YOURSELF OR YOUR FAMILY DOWN: NOT AT ALL
SUM OF ALL RESPONSES TO PHQ9 QUESTIONS 1 & 2: 2
2. FEELING DOWN, DEPRESSED OR HOPELESS: SEVERAL DAYS
4. FEELING TIRED OR HAVING LITTLE ENERGY: SEVERAL DAYS
7. TROUBLE CONCENTRATING ON THINGS, SUCH AS READING THE NEWSPAPER OR WATCHING TELEVISION: NOT AT ALL
10. IF YOU CHECKED OFF ANY PROBLEMS, HOW DIFFICULT HAVE THESE PROBLEMS MADE IT FOR YOU TO DO YOUR WORK, TAKE CARE OF THINGS AT HOME, OR GET ALONG WITH OTHER PEOPLE: NOT DIFFICULT AT ALL
SUM OF ALL RESPONSES TO PHQ QUESTIONS 1-9: 5
3. TROUBLE FALLING OR STAYING ASLEEP: SEVERAL DAYS
8. MOVING OR SPEAKING SO SLOWLY THAT OTHER PEOPLE COULD HAVE NOTICED. OR THE OPPOSITE, BEING SO FIGETY OR RESTLESS THAT YOU HAVE BEEN MOVING AROUND A LOT MORE THAN USUAL: NOT AT ALL
5. POOR APPETITE OR OVEREATING: SEVERAL DAYS
1. LITTLE INTEREST OR PLEASURE IN DOING THINGS: SEVERAL DAYS

## 2025-01-02 ASSESSMENT — ENCOUNTER SYMPTOMS
WHEEZING: 0
TROUBLE SWALLOWING: 0
SHORTNESS OF BREATH: 0
COUGH: 1
SORE THROAT: 1

## 2025-01-02 NOTE — PROGRESS NOTES
lisinopril (PRINIVIL;ZESTRIL) 20 MG tablet Take 1 tablet by mouth daily 30 tablet 3    rosuvastatin (CRESTOR) 5 MG tablet Take 1 tablet by mouth daily 90 tablet 3    potassium chloride (KLOR-CON M) 20 MEQ extended release tablet Take 1 tablet by mouth 2 times daily 180 tablet 3    sucralfate (CARAFATE) 1 GM tablet Take 1 tablet by mouth 4 times daily as needed (stomach pain) 30 tablet 3    cyclobenzaprine (FLEXERIL) 10 MG tablet TAKE 1 TABLET BY MOUTH THREE TIMES DAILY AS NEEDED FOR MUSCLE SPASMS 30 tablet 3    butalbital-acetaminophen-caffeine (FIORICET, ESGIC) -40 MG per tablet Take 1 tablet by mouth every 6 hours as needed for Migraine 30 tablet 0    LORazepam (ATIVAN) 0.5 MG tablet Take 1 tablet by mouth 3 times daily as needed for Anxiety for up to 120 days. Max Daily Amount: 1.5 mg 30 tablet 3    famotidine (PEPCID) 20 MG tablet Take 1 tablet by mouth every evening 90 tablet 3    hydrOXYzine pamoate (VISTARIL) 25 MG capsule Take 1 capsule by mouth 3 times daily as needed for Itching 30 capsule 3    pantoprazole (PROTONIX) 40 MG tablet Take 1 tablet by mouth 2 times daily (before meals) 180 tablet 1    ferrous sulfate (IRON 325) 325 (65 Fe) MG tablet Take 1 tablet by mouth daily (with breakfast) 90 tablet 1    bisacodyl (DULCOLAX) 5 MG EC tablet Take 1 tablet by mouth daily as needed for Constipation      Cholecalciferol (VITAMIN D3) 50 MCG (2000 UT) CAPS Take by mouth daily      Misc Natural Products (GLUCOSAMINE CHOND CMP TRIPLE PO) Take by mouth daily      guaiFENesin (MUCINEX) 600 MG extended release tablet Take 2 tablets by mouth 2 times daily      acetaminophen (TYLENOL) 650 MG extended release tablet Take 4 tablets by mouth every morning 4 (650 mg) tablets every morning      aspirin 81 MG EC tablet Take 1 tablet by mouth daily       No current facility-administered medications for this visit.       Orders Placed This Encounter   Procedures    AMB POC COVID-19 COV     Order Specific Question:

## 2025-01-24 ENCOUNTER — TELEPHONE (OUTPATIENT)
Dept: INTERNAL MEDICINE CLINIC | Facility: CLINIC | Age: 65
End: 2025-01-24

## 2025-01-24 NOTE — TELEPHONE ENCOUNTER
I sent the message below via my chart, patient has not read the following message, please relay. Thanks!    Ms. Dumont,  How are you doing on the Viibryd?   Hoping better!  Here if you need anything~  Carine NEWMAN thank you so much for the sweet [employer] card. Meant so much to me!      [address]    MyChart User Last Read On   Karolina Dumont Not Read

## 2025-01-24 NOTE — TELEPHONE ENCOUNTER
Called pt, she is doing some better   She is still crying at a drop of a hat and get some anxious feelings.  Its not as bad as it was.    Pt scheduled to see you on 2/17/2025

## 2025-01-31 DIAGNOSIS — F41.9 ANXIETY AND DEPRESSION: ICD-10-CM

## 2025-01-31 DIAGNOSIS — F32.A ANXIETY AND DEPRESSION: ICD-10-CM

## 2025-01-31 RX ORDER — BUSPIRONE HYDROCHLORIDE 10 MG/1
10 TABLET ORAL 3 TIMES DAILY
Qty: 270 TABLET | Refills: 3 | Status: SHIPPED | OUTPATIENT
Start: 2025-01-31

## 2025-02-17 ENCOUNTER — OFFICE VISIT (OUTPATIENT)
Dept: INTERNAL MEDICINE CLINIC | Facility: CLINIC | Age: 65
End: 2025-02-17
Payer: COMMERCIAL

## 2025-02-17 VITALS
HEIGHT: 67 IN | SYSTOLIC BLOOD PRESSURE: 135 MMHG | TEMPERATURE: 97.5 F | HEART RATE: 80 BPM | DIASTOLIC BLOOD PRESSURE: 78 MMHG | BODY MASS INDEX: 38.74 KG/M2 | OXYGEN SATURATION: 95 % | RESPIRATION RATE: 18 BRPM | WEIGHT: 246.8 LBS

## 2025-02-17 DIAGNOSIS — G43.009 MIGRAINE WITHOUT AURA AND WITHOUT STATUS MIGRAINOSUS, NOT INTRACTABLE: ICD-10-CM

## 2025-02-17 DIAGNOSIS — R60.9 DEPENDENT EDEMA: ICD-10-CM

## 2025-02-17 DIAGNOSIS — F32.A ANXIETY AND DEPRESSION: Primary | ICD-10-CM

## 2025-02-17 DIAGNOSIS — I10 PRIMARY HYPERTENSION: ICD-10-CM

## 2025-02-17 DIAGNOSIS — I25.10 CORONARY ARTERY DISEASE INVOLVING NATIVE HEART WITHOUT ANGINA PECTORIS, UNSPECIFIED VESSEL OR LESION TYPE: ICD-10-CM

## 2025-02-17 DIAGNOSIS — F41.9 ANXIETY AND DEPRESSION: Primary | ICD-10-CM

## 2025-02-17 DIAGNOSIS — K21.9 GASTROESOPHAGEAL REFLUX DISEASE, UNSPECIFIED WHETHER ESOPHAGITIS PRESENT: ICD-10-CM

## 2025-02-17 DIAGNOSIS — H65.91 FLUID LEVEL BEHIND TYMPANIC MEMBRANE OF RIGHT EAR: ICD-10-CM

## 2025-02-17 DIAGNOSIS — M15.9 GENERALIZED OSTEOARTHRITIS: ICD-10-CM

## 2025-02-17 LAB
ALBUMIN SERPL-MCNC: 3.6 G/DL (ref 3.2–4.6)
ALBUMIN/GLOB SERPL: 1.2 (ref 1–1.9)
ALP SERPL-CCNC: 100 U/L (ref 35–104)
ALT SERPL-CCNC: 30 U/L (ref 8–45)
ANION GAP SERPL CALC-SCNC: 9 MMOL/L (ref 7–16)
AST SERPL-CCNC: 24 U/L (ref 15–37)
BASOPHILS # BLD: 0.07 K/UL (ref 0–0.2)
BASOPHILS NFR BLD: 0.9 % (ref 0–2)
BILIRUB SERPL-MCNC: 0.4 MG/DL (ref 0–1.2)
BUN SERPL-MCNC: 10 MG/DL (ref 8–23)
CALCIUM SERPL-MCNC: 9.6 MG/DL (ref 8.8–10.2)
CHLORIDE SERPL-SCNC: 103 MMOL/L (ref 98–107)
CO2 SERPL-SCNC: 27 MMOL/L (ref 20–29)
CREAT SERPL-MCNC: 0.92 MG/DL (ref 0.6–1.1)
DIFFERENTIAL METHOD BLD: ABNORMAL
EOSINOPHIL # BLD: 0.33 K/UL (ref 0–0.8)
EOSINOPHIL NFR BLD: 4.3 % (ref 0.5–7.8)
ERYTHROCYTE [DISTWIDTH] IN BLOOD BY AUTOMATED COUNT: 14.5 % (ref 11.9–14.6)
FERRITIN SERPL-MCNC: 35 NG/ML (ref 8–388)
GLOBULIN SER CALC-MCNC: 2.9 G/DL (ref 2.3–3.5)
GLUCOSE SERPL-MCNC: 153 MG/DL (ref 70–99)
HCT VFR BLD AUTO: 36.2 % (ref 35.8–46.3)
HGB BLD-MCNC: 11.4 G/DL (ref 11.7–15.4)
IMM GRANULOCYTES # BLD AUTO: 0.1 K/UL (ref 0–0.5)
IMM GRANULOCYTES NFR BLD AUTO: 1.3 % (ref 0–5)
IRON SATN MFR SERPL: 14 % (ref 20–50)
IRON SERPL-MCNC: 42 UG/DL (ref 35–100)
LYMPHOCYTES # BLD: 1.6 K/UL (ref 0.5–4.6)
LYMPHOCYTES NFR BLD: 20.9 % (ref 13–44)
MCH RBC QN AUTO: 27.1 PG (ref 26.1–32.9)
MCHC RBC AUTO-ENTMCNC: 31.5 G/DL (ref 31.4–35)
MCV RBC AUTO: 86 FL (ref 82–102)
MONOCYTES # BLD: 0.46 K/UL (ref 0.1–1.3)
MONOCYTES NFR BLD: 6 % (ref 4–12)
NEUTS SEG # BLD: 5.11 K/UL (ref 1.7–8.2)
NEUTS SEG NFR BLD: 66.6 % (ref 43–78)
NRBC # BLD: 0 K/UL (ref 0–0.2)
PLATELET # BLD AUTO: 199 K/UL (ref 150–450)
PMV BLD AUTO: 9.4 FL (ref 9.4–12.3)
POTASSIUM SERPL-SCNC: 4.2 MMOL/L (ref 3.5–5.1)
PROT SERPL-MCNC: 6.6 G/DL (ref 6.3–8.2)
RBC # BLD AUTO: 4.21 M/UL (ref 4.05–5.2)
SODIUM SERPL-SCNC: 139 MMOL/L (ref 136–145)
TIBC SERPL-MCNC: 292 UG/DL (ref 240–450)
UIBC SERPL-MCNC: 250 UG/DL (ref 112–347)
WBC # BLD AUTO: 7.7 K/UL (ref 4.3–11.1)

## 2025-02-17 PROCEDURE — 3078F DIAST BP <80 MM HG: CPT | Performed by: NURSE PRACTITIONER

## 2025-02-17 PROCEDURE — 99214 OFFICE O/P EST MOD 30 MIN: CPT | Performed by: NURSE PRACTITIONER

## 2025-02-17 PROCEDURE — 3075F SYST BP GE 130 - 139MM HG: CPT | Performed by: NURSE PRACTITIONER

## 2025-02-17 RX ORDER — FUROSEMIDE 40 MG/1
40 TABLET ORAL 2 TIMES DAILY PRN
Qty: 60 TABLET | Refills: 0 | Status: SHIPPED | OUTPATIENT
Start: 2025-02-17

## 2025-02-17 RX ORDER — LISINOPRIL 20 MG/1
20 TABLET ORAL DAILY
Qty: 30 TABLET | Refills: 3 | Status: SHIPPED | OUTPATIENT
Start: 2025-02-17

## 2025-02-17 RX ORDER — SUCRALFATE 1 G/1
1 TABLET ORAL 4 TIMES DAILY PRN
Qty: 30 TABLET | Refills: 3 | Status: SHIPPED | OUTPATIENT
Start: 2025-02-17

## 2025-02-17 RX ORDER — BUTALBITAL, ACETAMINOPHEN AND CAFFEINE 50; 325; 40 MG/1; MG/1; MG/1
1 TABLET ORAL EVERY 6 HOURS PRN
Qty: 30 TABLET | Refills: 0 | Status: SHIPPED | OUTPATIENT
Start: 2025-02-17

## 2025-02-17 RX ORDER — CLOPIDOGREL BISULFATE 75 MG/1
75 TABLET ORAL DAILY
Qty: 90 TABLET | Refills: 3 | Status: SHIPPED | OUTPATIENT
Start: 2025-02-17

## 2025-02-17 RX ORDER — HYDROXYZINE PAMOATE 25 MG/1
25 CAPSULE ORAL 3 TIMES DAILY PRN
Qty: 90 CAPSULE | Refills: 3 | Status: SHIPPED | OUTPATIENT
Start: 2025-02-17

## 2025-02-17 RX ORDER — BUSPIRONE HYDROCHLORIDE 10 MG/1
20 TABLET ORAL 3 TIMES DAILY
Qty: 180 TABLET | Refills: 5
Start: 2025-02-17 | End: 2025-02-18 | Stop reason: SDUPTHER

## 2025-02-17 RX ORDER — FAMOTIDINE 40 MG/1
40 TABLET, FILM COATED ORAL 2 TIMES DAILY
COMMUNITY
Start: 2025-01-23

## 2025-02-17 SDOH — ECONOMIC STABILITY: FOOD INSECURITY: WITHIN THE PAST 12 MONTHS, YOU WORRIED THAT YOUR FOOD WOULD RUN OUT BEFORE YOU GOT MONEY TO BUY MORE.: NEVER TRUE

## 2025-02-17 SDOH — ECONOMIC STABILITY: FOOD INSECURITY: WITHIN THE PAST 12 MONTHS, THE FOOD YOU BOUGHT JUST DIDN'T LAST AND YOU DIDN'T HAVE MONEY TO GET MORE.: NEVER TRUE

## 2025-02-17 ASSESSMENT — PATIENT HEALTH QUESTIONNAIRE - PHQ9
10. IF YOU CHECKED OFF ANY PROBLEMS, HOW DIFFICULT HAVE THESE PROBLEMS MADE IT FOR YOU TO DO YOUR WORK, TAKE CARE OF THINGS AT HOME, OR GET ALONG WITH OTHER PEOPLE: SOMEWHAT DIFFICULT
5. POOR APPETITE OR OVEREATING: SEVERAL DAYS
7. TROUBLE CONCENTRATING ON THINGS, SUCH AS READING THE NEWSPAPER OR WATCHING TELEVISION: NOT AT ALL
SUM OF ALL RESPONSES TO PHQ QUESTIONS 1-9: 7
SUM OF ALL RESPONSES TO PHQ QUESTIONS 1-9: 7
8. MOVING OR SPEAKING SO SLOWLY THAT OTHER PEOPLE COULD HAVE NOTICED. OR THE OPPOSITE, BEING SO FIGETY OR RESTLESS THAT YOU HAVE BEEN MOVING AROUND A LOT MORE THAN USUAL: NOT AT ALL
3. TROUBLE FALLING OR STAYING ASLEEP: SEVERAL DAYS
9. THOUGHTS THAT YOU WOULD BE BETTER OFF DEAD, OR OF HURTING YOURSELF: NOT AT ALL
SUM OF ALL RESPONSES TO PHQ QUESTIONS 1-9: 7
4. FEELING TIRED OR HAVING LITTLE ENERGY: MORE THAN HALF THE DAYS
SUM OF ALL RESPONSES TO PHQ9 QUESTIONS 1 & 2: 2
SUM OF ALL RESPONSES TO PHQ QUESTIONS 1-9: 7
1. LITTLE INTEREST OR PLEASURE IN DOING THINGS: SEVERAL DAYS
2. FEELING DOWN, DEPRESSED OR HOPELESS: SEVERAL DAYS
6. FEELING BAD ABOUT YOURSELF - OR THAT YOU ARE A FAILURE OR HAVE LET YOURSELF OR YOUR FAMILY DOWN: SEVERAL DAYS

## 2025-02-17 ASSESSMENT — ENCOUNTER SYMPTOMS
ANAL BLEEDING: 1
BACK PAIN: 1
ABDOMINAL PAIN: 0
VOMITING: 0
SHORTNESS OF BREATH: 0
NAUSEA: 0

## 2025-02-17 NOTE — PROGRESS NOTES
Musculoskeletal:  Positive for arthralgias (chronic, is taking more Tylenol lately.), back pain and gait problem.   Neurological:  Positive for dizziness. Negative for tremors, syncope, facial asymmetry, speech difficulty, weakness, light-headedness and headaches.   Psychiatric/Behavioral:  Negative for dysphoric mood, hallucinations, self-injury, sleep disturbance and suicidal ideas. The patient is not nervous/anxious.    All other systems reviewed and are negative.      LMP  (LMP Unknown)     Vitals:    02/17/25 0938   BP: (!) 152/75   Pulse: 80   Resp: 18   Temp: 97.5 °F (36.4 °C)   SpO2: 96%   Recheck blood pressure 135/78    Physical Exam    Physical Exam  Vitals and nursing note reviewed.   Constitutional:       General: She is not in acute distress.     Appearance: She is not ill-appearing, toxic-appearing or diaphoretic.   HENT:      Right Ear: A middle ear effusion is present. Tympanic membrane is not perforated, erythematous, retracted or bulging.      Left Ear: Tympanic membrane normal.      Mouth/Throat:      Mouth: Mucous membranes are moist.   Cardiovascular:      Rate and Rhythm: Regular rhythm.   Pulmonary:      Effort: No respiratory distress.      Breath sounds: No wheezing, rhonchi or rales.   Musculoskeletal:      Right lower leg: No edema.      Left lower leg: No edema.   Neurological:      Mental Status: She is oriented to person, place, and time.      GCS: GCS eye subscore is 4. GCS verbal subscore is 5. GCS motor subscore is 6.      Gait: Gait abnormal (antalgic).   Psychiatric:         Attention and Perception: Attention normal.         Mood and Affect: Mood normal.         Speech: Speech normal.         Behavior: Behavior normal. Behavior is cooperative.         Thought Content: Thought content normal.         Cognition and Memory: Cognition and memory normal.         Judgment: Judgment normal.         Assessment & Plan      Current Outpatient Medications   Medication Sig Dispense Refill

## 2025-02-18 ENCOUNTER — TELEPHONE (OUTPATIENT)
Dept: INTERNAL MEDICINE CLINIC | Facility: CLINIC | Age: 65
End: 2025-02-18

## 2025-02-18 DIAGNOSIS — R73.09 ELEVATED GLUCOSE: Primary | ICD-10-CM

## 2025-02-18 DIAGNOSIS — R73.09 ELEVATED GLUCOSE: ICD-10-CM

## 2025-02-18 DIAGNOSIS — F32.A ANXIETY AND DEPRESSION: ICD-10-CM

## 2025-02-18 DIAGNOSIS — F41.9 ANXIETY AND DEPRESSION: ICD-10-CM

## 2025-02-18 LAB
EST. AVERAGE GLUCOSE BLD GHB EST-MCNC: 136 MG/DL
HBA1C MFR BLD: 6.4 % (ref 0–5.6)

## 2025-02-18 RX ORDER — BUSPIRONE HYDROCHLORIDE 10 MG/1
20 TABLET ORAL 3 TIMES DAILY
Qty: 180 TABLET | Refills: 5 | Status: SHIPPED | OUTPATIENT
Start: 2025-02-18

## 2025-02-22 ENCOUNTER — TELEPHONE (OUTPATIENT)
Dept: INTERNAL MEDICINE CLINIC | Facility: CLINIC | Age: 65
End: 2025-02-22

## 2025-02-23 NOTE — TELEPHONE ENCOUNTER
I sent the message below via my chart, patient has not read the following message, please relay. Thanks!    Mrs. Dumont,  The labs that we check show that the hemoglobin is improved and so continue your current medications. Eat an iron rich diet and follow up with Dr. Paul.   Your glucose level is a bit elevated and I am going to add on an A1c to assess for prediabetes or diabetes.   Make sure you are moving/exercising for 20 minutes daily and eating a heart healthy diet.   Here if you need anything, and I am glad you are feeling better~  Carine      [address]    MyChart User Last Read On   Karolina Dumont Not Read

## 2025-03-06 ENCOUNTER — OFFICE VISIT (OUTPATIENT)
Dept: INTERNAL MEDICINE CLINIC | Facility: CLINIC | Age: 65
End: 2025-03-06
Payer: COMMERCIAL

## 2025-03-06 VITALS
HEART RATE: 90 BPM | SYSTOLIC BLOOD PRESSURE: 145 MMHG | BODY MASS INDEX: 39.08 KG/M2 | RESPIRATION RATE: 18 BRPM | WEIGHT: 249 LBS | HEIGHT: 67 IN | DIASTOLIC BLOOD PRESSURE: 79 MMHG

## 2025-03-06 DIAGNOSIS — E66.01 CLASS 2 SEVERE OBESITY DUE TO EXCESS CALORIES WITH SERIOUS COMORBIDITY AND BODY MASS INDEX (BMI) OF 39.0 TO 39.9 IN ADULT: Primary | ICD-10-CM

## 2025-03-06 DIAGNOSIS — G89.29 CHRONIC PAIN OF BOTH KNEES: ICD-10-CM

## 2025-03-06 DIAGNOSIS — K21.9 GASTROESOPHAGEAL REFLUX DISEASE, UNSPECIFIED WHETHER ESOPHAGITIS PRESENT: ICD-10-CM

## 2025-03-06 DIAGNOSIS — M16.12 ARTHRITIS OF LEFT HIP: ICD-10-CM

## 2025-03-06 DIAGNOSIS — K29.50 CHRONIC GASTRITIS WITHOUT BLEEDING, UNSPECIFIED GASTRITIS TYPE: ICD-10-CM

## 2025-03-06 DIAGNOSIS — K27.9 PUD (PEPTIC ULCER DISEASE): ICD-10-CM

## 2025-03-06 DIAGNOSIS — M25.561 CHRONIC PAIN OF BOTH KNEES: ICD-10-CM

## 2025-03-06 DIAGNOSIS — M25.562 CHRONIC PAIN OF BOTH KNEES: ICD-10-CM

## 2025-03-06 DIAGNOSIS — I10 PRIMARY HYPERTENSION: ICD-10-CM

## 2025-03-06 DIAGNOSIS — E66.812 CLASS 2 SEVERE OBESITY DUE TO EXCESS CALORIES WITH SERIOUS COMORBIDITY AND BODY MASS INDEX (BMI) OF 39.0 TO 39.9 IN ADULT: Primary | ICD-10-CM

## 2025-03-06 PROBLEM — Z86.19 HISTORY OF HEPATITIS: Status: ACTIVE | Noted: 2025-03-06

## 2025-03-06 PROBLEM — D50.9 IRON DEFICIENCY ANEMIA, UNSPECIFIED: Status: ACTIVE | Noted: 2024-11-12

## 2025-03-06 PROBLEM — K57.30 DIVERTICULOSIS OF LARGE INTESTINE WITHOUT PERFORATION OR ABSCESS WITHOUT BLEEDING: Status: ACTIVE | Noted: 2024-11-12

## 2025-03-06 PROBLEM — Z86.0101 PERSONAL HISTORY OF ADENOMATOUS AND SERRATED COLON POLYPS: Status: ACTIVE | Noted: 2025-03-06

## 2025-03-06 PROCEDURE — 99214 OFFICE O/P EST MOD 30 MIN: CPT | Performed by: INTERNAL MEDICINE

## 2025-03-06 PROCEDURE — 3078F DIAST BP <80 MM HG: CPT | Performed by: INTERNAL MEDICINE

## 2025-03-06 PROCEDURE — 3077F SYST BP >= 140 MM HG: CPT | Performed by: INTERNAL MEDICINE

## 2025-03-06 RX ORDER — FLUTICASONE PROPIONATE 50 MCG
2 SPRAY, SUSPENSION (ML) NASAL DAILY
Qty: 16 G | Refills: 5 | Status: SHIPPED | OUTPATIENT
Start: 2025-03-06

## 2025-03-06 ASSESSMENT — ENCOUNTER SYMPTOMS
SHORTNESS OF BREATH: 0
BLOOD IN STOOL: 0
CONSTIPATION: 0
COUGH: 0
DIARRHEA: 0
VOMITING: 0
WHEEZING: 0
NAUSEA: 0
ABDOMINAL PAIN: 1

## 2025-03-06 ASSESSMENT — PATIENT HEALTH QUESTIONNAIRE - PHQ9
SUM OF ALL RESPONSES TO PHQ QUESTIONS 1-9: 11
1. LITTLE INTEREST OR PLEASURE IN DOING THINGS: SEVERAL DAYS
SUM OF ALL RESPONSES TO PHQ QUESTIONS 1-9: 11
5. POOR APPETITE OR OVEREATING: MORE THAN HALF THE DAYS
8. MOVING OR SPEAKING SO SLOWLY THAT OTHER PEOPLE COULD HAVE NOTICED. OR THE OPPOSITE, BEING SO FIGETY OR RESTLESS THAT YOU HAVE BEEN MOVING AROUND A LOT MORE THAN USUAL: SEVERAL DAYS
6. FEELING BAD ABOUT YOURSELF - OR THAT YOU ARE A FAILURE OR HAVE LET YOURSELF OR YOUR FAMILY DOWN: SEVERAL DAYS
2. FEELING DOWN, DEPRESSED OR HOPELESS: SEVERAL DAYS
SUM OF ALL RESPONSES TO PHQ QUESTIONS 1-9: 11
SUM OF ALL RESPONSES TO PHQ QUESTIONS 1-9: 11
9. THOUGHTS THAT YOU WOULD BE BETTER OFF DEAD, OR OF HURTING YOURSELF: NOT AT ALL
4. FEELING TIRED OR HAVING LITTLE ENERGY: NEARLY EVERY DAY
10. IF YOU CHECKED OFF ANY PROBLEMS, HOW DIFFICULT HAVE THESE PROBLEMS MADE IT FOR YOU TO DO YOUR WORK, TAKE CARE OF THINGS AT HOME, OR GET ALONG WITH OTHER PEOPLE: VERY DIFFICULT
7. TROUBLE CONCENTRATING ON THINGS, SUCH AS READING THE NEWSPAPER OR WATCHING TELEVISION: SEVERAL DAYS
3. TROUBLE FALLING OR STAYING ASLEEP: SEVERAL DAYS

## 2025-03-06 NOTE — PROGRESS NOTES
3/6/2025 5:47 PM  Location:[facility] PHYSICIAN SERVICES  [facility] [place]NAL MEDICINE  [place]  Patient #:  [id number]  YOB: 1960            History of Present Illness     Chief Complaint   Patient presents with    6 Month Follow-Up     6 month f/u    Hypertension    Gastroesophageal Reflux       Ms. Dumont is a 64 y.o. female  who presents for the above.   Notes that she told her GI doc that her stomach feels raw but was encouraged to stay on current medication regimen.  Notes that her joint pains are interfering with her life.  Has been putting on weight and feeling more depressed related to her pain and just not feeling well.           Allergies   Allergen Reactions    Latex Rash    Heparin (Bovine) Anaphylaxis     Past Medical History:   Diagnosis Date    Anxiety and depression 12/27/2018    Arthritis of left knee 12/2/2020    Autoimmune disease     fibromyalgia    CAD (coronary artery disease) 2006    mi    Chest pain     Coagulation defects     hx of dic ? pe    GERD (gastroesophageal reflux disease)     Hypertension     Palpitations 8/22/2016    Aug 2014 - K+ 3.4, Mg 2.1 7 day monitor - single 8 beat run of atrial ectopy, asymptomatic Feb 2016 - 7 day monitor - normal tracing, all symptoms with NSR    PUD (peptic ulcer disease)     Thromboembolus (HCC)     lle and pe's--had had foot surgery; just one time     Social History     Socioeconomic History    Marital status:      Spouse name: None    Number of children: None    Years of education: None    Highest education level: None   Tobacco Use    Smoking status: Never     Passive exposure: Never    Smokeless tobacco: Never   Substance and Sexual Activity    Alcohol use: No    Drug use: Never    Sexual activity: Yes     Partners: Male     Social Determinants of Health     Financial Resource Strain: Patient Declined (8/6/2024)    Overall Financial Resource Strain (CARDIA)     Difficulty of Paying Living Expenses: Patient declined   Food

## 2025-03-09 ENCOUNTER — PATIENT MESSAGE (OUTPATIENT)
Dept: INTERNAL MEDICINE CLINIC | Facility: CLINIC | Age: 65
End: 2025-03-09

## 2025-03-09 DIAGNOSIS — M15.9 GENERALIZED OSTEOARTHRITIS: Primary | ICD-10-CM

## 2025-03-09 DIAGNOSIS — G89.29 CHRONIC PAIN OF BOTH KNEES: ICD-10-CM

## 2025-03-09 DIAGNOSIS — M25.561 CHRONIC PAIN OF BOTH KNEES: ICD-10-CM

## 2025-03-09 DIAGNOSIS — M25.562 CHRONIC PAIN OF BOTH KNEES: ICD-10-CM

## 2025-03-09 DIAGNOSIS — F41.1 GENERALIZED ANXIETY DISORDER: Primary | ICD-10-CM

## 2025-03-09 DIAGNOSIS — M16.12 ARTHRITIS OF LEFT HIP: ICD-10-CM

## 2025-03-14 RX ORDER — DICLOFENAC SODIUM AND MISOPROSTOL 75; 200 MG/1; UG/1
1 TABLET, DELAYED RELEASE ORAL 2 TIMES DAILY PRN
Qty: 60 TABLET | Refills: 1 | Status: SHIPPED | OUTPATIENT
Start: 2025-03-14

## 2025-03-25 ENCOUNTER — OFFICE VISIT (OUTPATIENT)
Age: 65
End: 2025-03-25
Payer: COMMERCIAL

## 2025-03-25 VITALS — BODY MASS INDEX: 39.8 KG/M2 | WEIGHT: 247.63 LBS | HEIGHT: 66 IN

## 2025-03-25 DIAGNOSIS — M16.12 PRIMARY OSTEOARTHRITIS OF LEFT HIP: ICD-10-CM

## 2025-03-25 DIAGNOSIS — M51.362 DEGENERATION OF INTERVERTEBRAL DISC OF LUMBAR REGION WITH DISCOGENIC BACK PAIN AND LOWER EXTREMITY PAIN: ICD-10-CM

## 2025-03-25 DIAGNOSIS — M54.50 LOW BACK PAIN, UNSPECIFIED BACK PAIN LATERALITY, UNSPECIFIED CHRONICITY, UNSPECIFIED WHETHER SCIATICA PRESENT: Primary | ICD-10-CM

## 2025-03-25 DIAGNOSIS — M43.16 SPONDYLOLISTHESIS OF LUMBAR REGION: ICD-10-CM

## 2025-03-25 DIAGNOSIS — M54.16 LUMBAR RADICULOPATHY: ICD-10-CM

## 2025-03-25 DIAGNOSIS — M47.816 LUMBAR FACET ARTHROPATHY: ICD-10-CM

## 2025-03-25 PROCEDURE — 99204 OFFICE O/P NEW MOD 45 MIN: CPT | Performed by: NURSE PRACTITIONER

## 2025-03-25 RX ORDER — METHOCARBAMOL 500 MG/1
500 TABLET, FILM COATED ORAL 4 TIMES DAILY PRN
Qty: 90 TABLET | Refills: 0 | Status: SHIPPED | OUTPATIENT
Start: 2025-03-25 | End: 2025-04-24

## 2025-03-25 RX ORDER — GABAPENTIN 100 MG/1
100-300 CAPSULE ORAL NIGHTLY
Qty: 90 CAPSULE | Refills: 0 | Status: SHIPPED | OUTPATIENT
Start: 2025-03-25 | End: 2025-04-24

## 2025-03-31 ENCOUNTER — OFFICE VISIT (OUTPATIENT)
Dept: ORTHOPEDIC SURGERY | Age: 65
End: 2025-03-31
Payer: COMMERCIAL

## 2025-03-31 DIAGNOSIS — M25.552 LEFT HIP PAIN: Primary | ICD-10-CM

## 2025-03-31 DIAGNOSIS — M25.562 LEFT KNEE PAIN, UNSPECIFIED CHRONICITY: ICD-10-CM

## 2025-03-31 PROCEDURE — 20611 DRAIN/INJ JOINT/BURSA W/US: CPT | Performed by: STUDENT IN AN ORGANIZED HEALTH CARE EDUCATION/TRAINING PROGRAM

## 2025-03-31 PROCEDURE — 99204 OFFICE O/P NEW MOD 45 MIN: CPT | Performed by: STUDENT IN AN ORGANIZED HEALTH CARE EDUCATION/TRAINING PROGRAM

## 2025-03-31 RX ORDER — METHYLPREDNISOLONE ACETATE 80 MG/ML
80 INJECTION, SUSPENSION INTRA-ARTICULAR; INTRALESIONAL; INTRAMUSCULAR; SOFT TISSUE ONCE
Status: COMPLETED | OUTPATIENT
Start: 2025-03-31 | End: 2025-03-31

## 2025-03-31 RX ADMIN — METHYLPREDNISOLONE ACETATE 80 MG: 80 INJECTION, SUSPENSION INTRA-ARTICULAR; INTRALESIONAL; INTRAMUSCULAR; SOFT TISSUE at 11:36

## 2025-03-31 NOTE — PROGRESS NOTES
Name: Karolina Dumont  YOB: 1960  Gender: female  MRN: [id number]  Date of Encounter:  3/31/2025       CHIEF COMPLAINT:     Chief Complaint   Patient presents with    Knee Pain     Left    Hip Pain     Left        SUBJECTIVE/OBJECTIVE:      HPI:    Karolina Dumont  is a 64 y.o. pleasant female who presents today for a new evaluation of her left hip     Karolina Dumont  has a past medical history of Anxiety and depression, Arthritis of left knee, Autoimmune disease, CAD (coronary artery disease), Chest pain, Coagulation defects, GERD (gastroesophageal reflux disease), Hypertension, Palpitations, PUD (peptic ulcer disease), and Thromboembolus (HCC).     History of Present Illness  The patient is a 64-year-old female who presents for chronic left hip and knee pain. She has had years of ongoing left hip pain and previously been told she has arthritis. She has had no prior treatment including injections or therapy. She has been putting off treatment, trying to wait until she is 65 for potential surgery though she has been told that she may require an arthroplasty in the future. Her pain initially began with her knee pain that has had prior injections with some relief. Her hip pain is currently worse than her knee.       PAST HISTORY:   Past medical, surgical, family, social history and allergies reviewed by me.   Tobacco use:  reports that she has never smoked. She has never been exposed to tobacco smoke. She has never used smokeless tobacco.  Diabetes: none    REVIEW OF SYSTEMS:   As noted in HPI.     PHYSICAL EXAMINATION:     Gen: Well-developed, no acute distress   HEENT: NC/AT, EOMI   Neck: Trachea midline, normal ROM   CV: Regular rhythm by palpation of distal pulse, normal capillary refill   Pulm: No respiratory distress, no stridor   Psychiatric: Well oriented, normal mood and affect.   Skin: No rashes, lesions or ulcers, normal temperature, turgor, and texture on uninvolved extremity.

## 2025-04-17 ENCOUNTER — OFFICE VISIT (OUTPATIENT)
Dept: INTERNAL MEDICINE CLINIC | Facility: CLINIC | Age: 65
End: 2025-04-17
Payer: COMMERCIAL

## 2025-04-17 VITALS
HEART RATE: 90 BPM | HEIGHT: 66 IN | BODY MASS INDEX: 39.7 KG/M2 | DIASTOLIC BLOOD PRESSURE: 69 MMHG | RESPIRATION RATE: 18 BRPM | WEIGHT: 247 LBS | SYSTOLIC BLOOD PRESSURE: 136 MMHG

## 2025-04-17 DIAGNOSIS — F32.A ANXIETY AND DEPRESSION: ICD-10-CM

## 2025-04-17 DIAGNOSIS — F41.9 ANXIETY AND DEPRESSION: ICD-10-CM

## 2025-04-17 DIAGNOSIS — M15.9 GENERALIZED OSTEOARTHRITIS: Primary | ICD-10-CM

## 2025-04-17 DIAGNOSIS — R42 VERTIGO: ICD-10-CM

## 2025-04-17 PROCEDURE — 3078F DIAST BP <80 MM HG: CPT | Performed by: INTERNAL MEDICINE

## 2025-04-17 PROCEDURE — 99214 OFFICE O/P EST MOD 30 MIN: CPT | Performed by: INTERNAL MEDICINE

## 2025-04-17 PROCEDURE — 3075F SYST BP GE 130 - 139MM HG: CPT | Performed by: INTERNAL MEDICINE

## 2025-04-17 RX ORDER — VILAZODONE HYDROCHLORIDE 40 MG/1
40 TABLET ORAL DAILY
Qty: 30 TABLET | Refills: 11 | Status: SHIPPED | OUTPATIENT
Start: 2025-04-17

## 2025-04-17 RX ORDER — DICLOFENAC SODIUM AND MISOPROSTOL 75; 200 MG/1; UG/1
1 TABLET, DELAYED RELEASE ORAL 2 TIMES DAILY PRN
Qty: 60 TABLET | Refills: 1 | Status: SHIPPED | OUTPATIENT
Start: 2025-04-17

## 2025-04-17 RX ORDER — MECLIZINE HCL 12.5 MG 12.5 MG/1
12.5 TABLET ORAL 3 TIMES DAILY PRN
Qty: 30 TABLET | Refills: 0 | Status: SHIPPED | OUTPATIENT
Start: 2025-04-17 | End: 2025-04-27

## 2025-04-17 NOTE — PROGRESS NOTES
180 tablet 3    cyclobenzaprine (FLEXERIL) 10 MG tablet TAKE 1 TABLET BY MOUTH THREE TIMES DAILY AS NEEDED FOR MUSCLE SPASMS 30 tablet 3    pantoprazole (PROTONIX) 40 MG tablet Take 1 tablet by mouth 2 times daily (before meals) 180 tablet 1    guaiFENesin (MUCINEX) 600 MG extended release tablet Take 2 tablets by mouth 2 times daily      acetaminophen (TYLENOL) 650 MG extended release tablet Take 4 tablets by mouth every morning 4 (650 mg) tablets every morning      aspirin 81 MG EC tablet Take 1 tablet by mouth daily      naltrexone-buPROPion (CONTRAVE) 8-90 MG per extended release tablet Take 2 tablets by mouth 2 times daily Start with one qd for a week and increase by one pill weekly until you reach 2 po twice daily (Patient not taking: Reported on 4/17/2025) 120 tablet 5     No current facility-administered medications for this visit.       No orders of the defined types were placed in this encounter.      Orders Placed This Encounter   Medications    vilazodone HCl (VIIBRYD) 40 MG TABS     Sig: Take 1 tablet by mouth daily     Dispense:  30 tablet     Refill:  11    diclofenac-miSOPROStol (ARTHROTEC) 75-0.2 MG per tablet     Sig: Take 1 tablet by mouth 2 times daily as needed for Pain     Dispense:  60 tablet     Refill:  1    meclizine (ANTIVERT) 12.5 MG tablet     Sig: Take 1 tablet by mouth 3 times daily as needed for Dizziness     Dispense:  30 tablet     Refill:  0       Medications Discontinued During This Encounter   Medication Reason    diclofenac-miSOPROStol (ARTHROTEC) 50-0.2 MG per tablet Therapy completed    furosemide (LASIX) 40 MG tablet Therapy completed    vilazodone HCl (VIIBRYD) 40 MG TABS REORDER    diclofenac-miSOPROStol (ARTHROTEC) 75-0.2 MG per tablet REORDER        Diagnosis Orders   1. Generalized osteoarthritis        2. Anxiety and depression  vilazodone HCl (VIIBRYD) 40 MG TABS      3. Vertigo           Urged her to start the Contrave so that she will be a candidate for hip

## 2025-05-06 ENCOUNTER — OFFICE VISIT (OUTPATIENT)
Age: 65
End: 2025-05-06
Payer: COMMERCIAL

## 2025-05-06 DIAGNOSIS — M43.16 SPONDYLOLISTHESIS OF LUMBAR REGION: Primary | ICD-10-CM

## 2025-05-06 DIAGNOSIS — M16.12 PRIMARY OSTEOARTHRITIS OF LEFT HIP: ICD-10-CM

## 2025-05-06 DIAGNOSIS — M54.16 LUMBAR RADICULOPATHY: ICD-10-CM

## 2025-05-06 PROCEDURE — 99214 OFFICE O/P EST MOD 30 MIN: CPT | Performed by: NURSE PRACTITIONER

## 2025-05-06 RX ORDER — GABAPENTIN 100 MG/1
100-300 CAPSULE ORAL NIGHTLY
Qty: 90 CAPSULE | Refills: 3 | Status: SHIPPED | OUTPATIENT
Start: 2025-05-06 | End: 2025-09-03

## 2025-05-06 RX ORDER — METHOCARBAMOL 500 MG/1
500 TABLET, FILM COATED ORAL 4 TIMES DAILY PRN
Qty: 90 TABLET | Refills: 2 | Status: SHIPPED | OUTPATIENT
Start: 2025-05-06 | End: 2025-08-04

## 2025-05-06 NOTE — PROGRESS NOTES
Name: Karolina Dumont  YOB: 1960  Gender: female  MRN: [id number]    CC: Follow-up bilateral leg pain, left hip and groin pain    HPI: This is a 64 y.o. year old female who presented to me with complaints of low back pain for 6 years pain that radiated down the lateral aspect of each leg left worse than right with significant left hip and groin pain to the anterior knee.  The right leg extends all the way to the ankle.  She was found to have a severely arthritic left hip.  Also found to have a spondylolisthesis of L4 and L5 with multilevel disc degeneration.  Patient was given low-dose gabapentin at night, muscle relaxers.  She has completed her home exercise regimen.  I also referred her to Dr. Deanna Quevedo who provided her with a left hip injection.  Patient states that her left sided hip pain and leg pain is gone.  She been very pleased.  She did try to move a piece of furniture a few days ago and has a little bit of residual back pain but feels that this will get better.  Overall she states that her leg pain has improved.  Most likely related to the gabapentin.    Thus far, the patient has tried tylenol, NSAIDS, muscle relaxers, gabapentin or lyrica, and physician directed home exercise program.    Patient has completed a home exercise program under my care for 6 weeks, from 3/25/25 to 5/6/25  performing exercises 5-6 times a week.These exercises included: Pelvic tilt, cat and camel, single knee-to-chest, double knee-to-chest, lower trunk rotation, pelvic bridging, prone on elbows, prone on extended arms, standing back extensions, side bend, prone upper extremity exercise, prone lower extremity exercise.               3/25/2025    10:56 AM   AMB PAIN ASSESSMENT   Location of Pain Back   Location Modifiers Right;Left            Allergies   Allergen Reactions    Latex Rash    Heparin (Bovine) Anaphylaxis       Current Outpatient Medications:     vilazodone HCl (VIIBRYD) 40 MG TABS, Take 1 tablet by

## 2025-05-21 ENCOUNTER — PATIENT MESSAGE (OUTPATIENT)
Dept: INTERNAL MEDICINE CLINIC | Facility: CLINIC | Age: 65
End: 2025-05-21

## 2025-05-21 DIAGNOSIS — D50.9 IRON DEFICIENCY ANEMIA, UNSPECIFIED IRON DEFICIENCY ANEMIA TYPE: ICD-10-CM

## 2025-05-21 RX ORDER — PANTOPRAZOLE SODIUM 40 MG/1
40 TABLET, DELAYED RELEASE ORAL
Qty: 180 TABLET | Refills: 3 | Status: SHIPPED | OUTPATIENT
Start: 2025-05-21

## 2025-05-28 ENCOUNTER — TELEPHONE (OUTPATIENT)
Dept: INTERNAL MEDICINE CLINIC | Facility: CLINIC | Age: 65
End: 2025-05-28

## 2025-05-29 ENCOUNTER — TELEPHONE (OUTPATIENT)
Dept: INTERNAL MEDICINE CLINIC | Facility: CLINIC | Age: 65
End: 2025-05-29

## 2025-05-29 NOTE — TELEPHONE ENCOUNTER
I have talked with Ms. Dumont and she has decided to keep the appointment as scheduled due to no earlier appointments.

## 2025-06-04 ENCOUNTER — TELEPHONE (OUTPATIENT)
Age: 65
End: 2025-06-04

## 2025-06-04 NOTE — TELEPHONE ENCOUNTER
Appointment Request From: Karolina Dumont     With Provider: Dr. Jeet Willson MD [[facility] CARDIOLOGY]     Preferred Date Range: 6/10/2025 - 6/12/2025     Preferred Times: Any Time     Reason for visit: Request an Appointment     Health Maintenance Topic:      Comments:  over due for Dr ROD. Dizzy daily. Bottom BP low. Heart rate up. Leg swelling.

## 2025-06-04 NOTE — TELEPHONE ENCOUNTER
Pt.is calling reporting dizziness for a while.Had trouble w/low iron which is what it originally was attributed to but it continues w/normal iron level.She has noticed her BP running low 130/58 this am HR=82,145/71 and hr=99.Some swelling in legs off/on.    I made appt.6/25 w/ in [place] at her request.She says she had always seen  and somehow got switched to  as a work in.Wants to see .  I did advise she see PCP in interim and she has made appt.Monday w/PCP.I advised ER/Urgent Care PRN in interim and she v/u.

## 2025-06-12 ENCOUNTER — CARE COORDINATION (OUTPATIENT)
Dept: CARE COORDINATION | Facility: CLINIC | Age: 65
End: 2025-06-12

## 2025-06-12 NOTE — CARE COORDINATION
Care Transitions Note    Initial Call - Call within 2 business days of discharge: Yes    Attempted to reach patient for transitions of care follow up. Unable to reach patient.    Outreach Attempts:   HIPAA compliant voicemail left for patient.     Patient: Karolina Dumont    Patient : 1960   MRN: [id number]    Reason for Admission: Elevated troponin   Discharge Date:    RURS: No data recorded  Last Discharge Facility             Was this an external facility discharge? Yes. Discharge Date: 2025. Facility Name: [facility] Short Stay    Follow Up Appointment:   Patient has hospital follow up appointment scheduled within 7 days of discharge.    Future Appointments         Provider Specialty Dept Phone    2025 11:30 AM Mona Stallings MD Internal Medicine [phone]    2025 11:30 AM Lilaina Whatley MD Cardiology [phone]    9/10/2025 11:00 AM Mona Stallings MD Internal Medicine [phone]            Plan for follow-up on next business day.      Lolita Cleaning, RN

## 2025-06-13 ENCOUNTER — CARE COORDINATION (OUTPATIENT)
Dept: CARE COORDINATION | Facility: CLINIC | Age: 65
End: 2025-06-13

## 2025-06-16 ENCOUNTER — CARE COORDINATION (OUTPATIENT)
Dept: CARE COORDINATION | Facility: CLINIC | Age: 65
End: 2025-06-16

## 2025-06-16 NOTE — CARE COORDINATION
Care Transitions Note    Initial Call - Call within 2 business days of discharge: Yes    Attempted to reach patient for transitions of care follow up. Unable to reach patient.    Outreach Attempts:   HIPAA compliant voicemail left for patient.   [place] [facility] message sent.     Patient: Karolina Dumont    Patient : 1960   MRN: [id number]    Reason for Admission: Palpitations  Discharge Date:    RURS: No data recorded  Last Discharge Facility             Was this an external facility discharge? Yes. Discharge Date: 2025. Facility Name: [facility]    Follow Up Appointment:   Patient has hospital follow up appointment scheduled within 7 days of discharge.    Future Appointments         Provider Specialty Dept Phone    2025 11:30 AM Mona Stallings MD Internal Medicine [phone]    2025 11:30 AM Liliana Whatley MD Cardiology [phone]    9/10/2025 11:00 AM Mona Stallings MD Internal Medicine [phone]            No further follow-up call indicated     Lolita Cleaning, RN

## 2025-06-19 ENCOUNTER — OFFICE VISIT (OUTPATIENT)
Dept: INTERNAL MEDICINE CLINIC | Facility: CLINIC | Age: 65
End: 2025-06-19

## 2025-06-19 VITALS
BODY MASS INDEX: 39.7 KG/M2 | DIASTOLIC BLOOD PRESSURE: 64 MMHG | HEIGHT: 66 IN | HEART RATE: 71 BPM | RESPIRATION RATE: 18 BRPM | SYSTOLIC BLOOD PRESSURE: 131 MMHG | WEIGHT: 247 LBS

## 2025-06-19 DIAGNOSIS — K21.9 GASTROESOPHAGEAL REFLUX DISEASE, UNSPECIFIED WHETHER ESOPHAGITIS PRESENT: ICD-10-CM

## 2025-06-19 DIAGNOSIS — G89.29 CHRONIC PAIN OF LEFT KNEE: ICD-10-CM

## 2025-06-19 DIAGNOSIS — K64.9 BLEEDING HEMORRHOIDS: ICD-10-CM

## 2025-06-19 DIAGNOSIS — F41.9 ANXIETY AND DEPRESSION: ICD-10-CM

## 2025-06-19 DIAGNOSIS — F41.9 ANXIETY: ICD-10-CM

## 2025-06-19 DIAGNOSIS — Z09 HOSPITAL DISCHARGE FOLLOW-UP: ICD-10-CM

## 2025-06-19 DIAGNOSIS — F32.A ANXIETY AND DEPRESSION: ICD-10-CM

## 2025-06-19 DIAGNOSIS — M16.12 ARTHRITIS OF LEFT HIP: Primary | ICD-10-CM

## 2025-06-19 DIAGNOSIS — M25.562 CHRONIC PAIN OF LEFT KNEE: ICD-10-CM

## 2025-06-19 PROBLEM — D64.9 ANEMIA: Status: ACTIVE | Noted: 2025-06-07

## 2025-06-19 RX ORDER — METHOCARBAMOL 500 MG/1
500 TABLET, FILM COATED ORAL 4 TIMES DAILY PRN
Qty: 90 TABLET | Refills: 2 | Status: SHIPPED | OUTPATIENT
Start: 2025-06-19 | End: 2025-09-17

## 2025-06-19 RX ORDER — FUROSEMIDE 40 MG/1
40 TABLET ORAL 2 TIMES DAILY
Qty: 60 TABLET | Refills: 3 | Status: SHIPPED | OUTPATIENT
Start: 2025-06-19 | End: 2025-10-17

## 2025-06-19 RX ORDER — METOPROLOL TARTRATE 50 MG
50 TABLET ORAL 2 TIMES DAILY
COMMUNITY
Start: 2025-06-12

## 2025-06-19 RX ORDER — MECLIZINE HYDROCHLORIDE 25 MG/1
25 TABLET ORAL 3 TIMES DAILY PRN
Qty: 90 TABLET | Refills: 1 | Status: SHIPPED | OUTPATIENT
Start: 2025-06-19

## 2025-06-19 RX ORDER — DILTIAZEM HYDROCHLORIDE 120 MG/1
120 CAPSULE, COATED, EXTENDED RELEASE ORAL DAILY
COMMUNITY
Start: 2025-06-11 | End: 2025-06-19 | Stop reason: SDUPTHER

## 2025-06-19 RX ORDER — TRAMADOL HYDROCHLORIDE 50 MG/1
50 TABLET ORAL EVERY 6 HOURS PRN
Qty: 28 TABLET | Refills: 0 | Status: SHIPPED | OUTPATIENT
Start: 2025-06-19 | End: 2025-06-26

## 2025-06-19 RX ORDER — LORAZEPAM 0.5 MG/1
0.5 TABLET ORAL PRN
Qty: 30 TABLET | Refills: 1 | Status: SHIPPED | OUTPATIENT
Start: 2025-06-19 | End: 2025-07-19

## 2025-06-19 RX ORDER — BUSPIRONE HYDROCHLORIDE 10 MG/1
20 TABLET ORAL 3 TIMES DAILY
Qty: 180 TABLET | Refills: 5 | Status: SHIPPED | OUTPATIENT
Start: 2025-06-19

## 2025-06-19 RX ORDER — CYCLOBENZAPRINE HCL 10 MG
TABLET ORAL
Qty: 30 TABLET | Refills: 3 | Status: SHIPPED | OUTPATIENT
Start: 2025-06-19

## 2025-06-19 RX ORDER — HYDROXYZINE PAMOATE 25 MG/1
25 CAPSULE ORAL 3 TIMES DAILY PRN
Qty: 90 CAPSULE | Refills: 3 | Status: SHIPPED | OUTPATIENT
Start: 2025-06-19

## 2025-06-19 RX ORDER — DILTIAZEM HYDROCHLORIDE 120 MG/1
120 CAPSULE, COATED, EXTENDED RELEASE ORAL DAILY
Qty: 30 CAPSULE | Refills: 11 | Status: SHIPPED | OUTPATIENT
Start: 2025-06-19 | End: 2026-06-14

## 2025-06-19 RX ORDER — SUCRALFATE 1 G/1
1 TABLET ORAL 4 TIMES DAILY PRN
Qty: 30 TABLET | Refills: 3 | Status: SHIPPED | OUTPATIENT
Start: 2025-06-19

## 2025-06-19 RX ORDER — DICLOFENAC SODIUM AND MISOPROSTOL 75; 200 MG/1; UG/1
1 TABLET, DELAYED RELEASE ORAL 2 TIMES DAILY PRN
Qty: 60 TABLET | Refills: 3 | Status: CANCELLED | OUTPATIENT
Start: 2025-06-19

## 2025-06-19 RX ORDER — LORAZEPAM 0.5 MG/1
0.5 TABLET ORAL PRN
COMMUNITY
End: 2025-06-19 | Stop reason: SDUPTHER

## 2025-06-19 RX ORDER — MECLIZINE HYDROCHLORIDE 25 MG/1
25 TABLET ORAL 3 TIMES DAILY PRN
COMMUNITY
End: 2025-06-19 | Stop reason: SDUPTHER

## 2025-06-19 RX ORDER — FUROSEMIDE 40 MG/1
40 TABLET ORAL 2 TIMES DAILY
COMMUNITY
Start: 2025-06-11 | End: 2025-06-19 | Stop reason: SDUPTHER

## 2025-06-19 ASSESSMENT — ENCOUNTER SYMPTOMS: BLOOD IN STOOL: 0

## 2025-06-19 NOTE — PROGRESS NOTES
hydrOXYzine pamoate (VISTARIL) 25 MG capsule Take 1 capsule by mouth 3 times daily as needed for Anxiety 90 capsule 3    LORazepam (ATIVAN) 0.5 MG tablet Take 1 tablet by mouth as needed for Anxiety for up to 30 days. 30 tablet 1    meclizine (ANTIVERT) 25 MG tablet Take 1 tablet by mouth 3 times daily as needed for Dizziness 90 tablet 1    methocarbamol (ROBAXIN) 500 MG tablet Take 1 tablet by mouth 4 times daily as needed (for muscle spasm) 90 tablet 2    sucralfate (CARAFATE) 1 GM tablet Take 1 tablet by mouth 4 times daily as needed (stomach pain) 30 tablet 3    traMADol (ULTRAM) 50 MG tablet Take 1 tablet by mouth every 6 hours as needed for Pain for up to 7 days. Max Daily Amount: 200 mg 28 tablet 0    pantoprazole (PROTONIX) 40 MG tablet Take 1 tablet by mouth 2 times daily (before meals) 180 tablet 3    gabapentin (NEURONTIN) 100 MG capsule Take 1-3 capsules by mouth nightly for 120 days. 90 capsule 3    vilazodone HCl (VIIBRYD) 40 MG TABS Take 1 tablet by mouth daily 30 tablet 11    famotidine (PEPCID) 40 MG tablet Take 1 tablet by mouth 2 times daily      clopidogrel (PLAVIX) 75 MG tablet Take 1 tablet by mouth daily 90 tablet 3    lisinopril (PRINIVIL;ZESTRIL) 20 MG tablet Take 1 tablet by mouth daily 30 tablet 3    butalbital-acetaminophen-caffeine (FIORICET, ESGIC) -40 MG per tablet Take 1 tablet by mouth every 6 hours as needed for Migraine 30 tablet 0    rosuvastatin (CRESTOR) 5 MG tablet Take 1 tablet by mouth daily 90 tablet 3    potassium chloride (KLOR-CON M) 20 MEQ extended release tablet Take 1 tablet by mouth 2 times daily 180 tablet 3    guaiFENesin (MUCINEX) 600 MG extended release tablet Take 2 tablets by mouth 2 times daily      acetaminophen (TYLENOL) 650 MG extended release tablet Take 4 tablets by mouth every morning 4 (650 mg) tablets every morning      aspirin 81 MG EC tablet Take 1 tablet by mouth daily       No current facility-administered medications for this visit.

## 2025-06-23 DIAGNOSIS — F41.9 ANXIETY AND DEPRESSION: Primary | ICD-10-CM

## 2025-06-23 DIAGNOSIS — F32.A ANXIETY AND DEPRESSION: Primary | ICD-10-CM

## 2025-06-24 NOTE — PROGRESS NOTES
[facility] CARDIOLOGY  [address] [place] [address], [address]  [place] [address]  PHONE: [phone]      25    NAME:  Karolina Dumont  : 1960  MRN: [id number]         SUBJECTIVE:   Karolina Dumont is a 64 y.o. female seen for a follow up visit regarding the following:     Chief Complaint   Patient presents with    Shortness of Breath    Dizziness            HPI:  Follow up  Shortness of Breath and Dizziness   .    Patient was last seen in 2022.  Follow up long standing palpitations without significant arrhythmia. Remote coronary stent Chronic BB therapy.   Severe anxiety with panic attacks.     She is awaiting hip replacement surgery in the fall.  Hasn't decided on a surgeon yet.  Her cardiovascular status she thinks has been good.  She has been diagnosed with MAUDE, mild.  She had an episode of palpitations when she got out of the shower, she ended up admitted to [facility], cardiology there saw only ST, adjusted her meds and added CCB.  Hgb was low, recent endoscopy showed hemorrhoids.  Echo looked well.          Past cardiac history:    - provoked PE after ankle fracture - AC and IVC filter    - stent to Dx   Subsequent cath - patent stent    - Cath - patent stent, minimal disease otherwise    - 7 day monitor - single 8 beat run of atrial ectopy, asymptomatic   2016 - 7 day monitor - normal tracing, all symptoms with NSR   2016 - Stress MPI normal stage III   Dec 2017 - Echo otherwise normal suggests flow between PA and aorta just distal to SCA consistent with PDA.  CTA ordered, shunt fraction 1:4   CTA - essentially normal study.  Ductus \"bump\" is noted, possible small PDA but cannot confirm patency on CT.  Normal vascular sizes, structures, filling and drainage.     Sep 2019- 7 day monitor - normal tracing, 9 beats asymptomatic atrial tachycardia.  Symptoms fail to correlate with any abnormality   2021 14 day monitor - NSR with occasional PVC/rare PAC,

## 2025-06-25 ENCOUNTER — OFFICE VISIT (OUTPATIENT)
Age: 65
End: 2025-06-25
Payer: COMMERCIAL

## 2025-06-25 VITALS
DIASTOLIC BLOOD PRESSURE: 78 MMHG | HEIGHT: 68 IN | BODY MASS INDEX: 37.44 KG/M2 | HEART RATE: 67 BPM | SYSTOLIC BLOOD PRESSURE: 130 MMHG | WEIGHT: 247 LBS

## 2025-06-25 DIAGNOSIS — Z01.818 PRE-OP EVALUATION: ICD-10-CM

## 2025-06-25 DIAGNOSIS — I10 PRIMARY HYPERTENSION: ICD-10-CM

## 2025-06-25 DIAGNOSIS — I25.10 CORONARY ARTERY DISEASE INVOLVING NATIVE HEART WITHOUT ANGINA PECTORIS, UNSPECIFIED VESSEL OR LESION TYPE: Primary | ICD-10-CM

## 2025-06-25 PROCEDURE — 3075F SYST BP GE 130 - 139MM HG: CPT | Performed by: INTERNAL MEDICINE

## 2025-06-25 PROCEDURE — 99214 OFFICE O/P EST MOD 30 MIN: CPT | Performed by: INTERNAL MEDICINE

## 2025-06-25 PROCEDURE — 3078F DIAST BP <80 MM HG: CPT | Performed by: INTERNAL MEDICINE

## 2025-06-25 PROCEDURE — 93000 ELECTROCARDIOGRAM COMPLETE: CPT | Performed by: INTERNAL MEDICINE

## 2025-06-26 NOTE — TELEPHONE ENCOUNTER
Requested Prescriptions     Pending Prescriptions Disp Refills    metoprolol tartrate (LOPRESSOR) 50 MG tablet 180 tablet 3     Sig: Take 1 tablet by mouth 2 times daily    dilTIAZem (CARDIZEM CD) 120 MG extended release capsule 90 capsule 3     Sig: Take 1 capsule by mouth daily     Rx verified last ov 6/25/25

## 2025-06-26 NOTE — TELEPHONE ENCOUNTER
Needs All medication's that Dr. Marina wrote called in to [facility] at [phone]. Patient did not leave the names of medication's on the medication refill line.

## 2025-06-27 RX ORDER — METOPROLOL TARTRATE 50 MG
50 TABLET ORAL 2 TIMES DAILY
Qty: 180 TABLET | Refills: 3 | Status: SHIPPED | OUTPATIENT
Start: 2025-06-27

## 2025-06-27 RX ORDER — DILTIAZEM HYDROCHLORIDE 120 MG/1
120 CAPSULE, COATED, EXTENDED RELEASE ORAL DAILY
Qty: 90 CAPSULE | Refills: 3 | Status: SHIPPED | OUTPATIENT
Start: 2025-06-27 | End: 2026-06-22

## 2025-07-03 ENCOUNTER — OFFICE VISIT (OUTPATIENT)
Dept: INTERNAL MEDICINE CLINIC | Facility: CLINIC | Age: 65
End: 2025-07-03
Payer: COMMERCIAL

## 2025-07-03 VITALS
WEIGHT: 233.4 LBS | TEMPERATURE: 97.2 F | DIASTOLIC BLOOD PRESSURE: 53 MMHG | SYSTOLIC BLOOD PRESSURE: 127 MMHG | HEIGHT: 66 IN | BODY MASS INDEX: 37.51 KG/M2 | RESPIRATION RATE: 18 BRPM | OXYGEN SATURATION: 99 % | HEART RATE: 63 BPM

## 2025-07-03 DIAGNOSIS — N30.90 CYSTITIS: Primary | ICD-10-CM

## 2025-07-03 DIAGNOSIS — N30.90 CYSTITIS: ICD-10-CM

## 2025-07-03 DIAGNOSIS — M16.12 ARTHRITIS OF LEFT HIP: ICD-10-CM

## 2025-07-03 DIAGNOSIS — D50.9 IRON DEFICIENCY ANEMIA, UNSPECIFIED IRON DEFICIENCY ANEMIA TYPE: ICD-10-CM

## 2025-07-03 LAB
ALBUMIN SERPL-MCNC: 4.6 G/DL (ref 3.2–4.6)
ALBUMIN/GLOB SERPL: 1.6 (ref 1–1.9)
ALP SERPL-CCNC: 101 U/L (ref 35–104)
ALT SERPL-CCNC: 39 U/L (ref 8–45)
ANION GAP SERPL CALC-SCNC: 15 MMOL/L (ref 7–16)
APPEARANCE UR: CLEAR
AST SERPL-CCNC: 48 U/L (ref 15–37)
BACTERIA URNS QL MICRO: ABNORMAL /HPF
BASOPHILS # BLD: 0.16 K/UL (ref 0–0.2)
BASOPHILS NFR BLD: 1.7 % (ref 0–2)
BILIRUB SERPL-MCNC: 0.6 MG/DL (ref 0–1.2)
BILIRUB UR QL: NEGATIVE
BUN SERPL-MCNC: 14 MG/DL (ref 8–23)
CALCIUM SERPL-MCNC: 10.7 MG/DL (ref 8.8–10.2)
CHLORIDE SERPL-SCNC: 101 MMOL/L (ref 98–107)
CO2 SERPL-SCNC: 24 MMOL/L (ref 20–29)
COLOR UR: ABNORMAL
CREAT SERPL-MCNC: 1.4 MG/DL (ref 0.6–1.1)
DIFFERENTIAL METHOD BLD: ABNORMAL
EOSINOPHIL # BLD: 0.45 K/UL (ref 0–0.8)
EOSINOPHIL NFR BLD: 4.6 % (ref 0.5–7.8)
EPI CELLS #/AREA URNS HPF: ABNORMAL /HPF
ERYTHROCYTE [DISTWIDTH] IN BLOOD BY AUTOMATED COUNT: 19.6 % (ref 11.9–14.6)
GLOBULIN SER CALC-MCNC: 2.8 G/DL (ref 2.3–3.5)
GLUCOSE SERPL-MCNC: 124 MG/DL (ref 70–99)
GLUCOSE UR STRIP.AUTO-MCNC: NEGATIVE MG/DL
HCT VFR BLD AUTO: 42.4 % (ref 35.8–46.3)
HGB BLD-MCNC: 12 G/DL (ref 11.7–15.4)
HGB UR QL STRIP: ABNORMAL
IMM GRANULOCYTES # BLD AUTO: 0.07 K/UL (ref 0–0.5)
IMM GRANULOCYTES NFR BLD AUTO: 0.7 % (ref 0–5)
KETONES UR QL STRIP.AUTO: NEGATIVE MG/DL
LEUKOCYTE ESTERASE UR QL STRIP.AUTO: NEGATIVE
LYMPHOCYTES # BLD: 1.43 K/UL (ref 0.5–4.6)
LYMPHOCYTES NFR BLD: 14.8 % (ref 13–44)
MCH RBC QN AUTO: 24.9 PG (ref 26.1–32.9)
MCHC RBC AUTO-ENTMCNC: 28.3 G/DL (ref 31.4–35)
MCV RBC AUTO: 88.1 FL (ref 82–102)
MONOCYTES # BLD: 0.82 K/UL (ref 0.1–1.3)
MONOCYTES NFR BLD: 8.5 % (ref 4–12)
NEUTS SEG # BLD: 6.75 K/UL (ref 1.7–8.2)
NEUTS SEG NFR BLD: 69.7 % (ref 43–78)
NITRITE UR QL STRIP.AUTO: NEGATIVE
NRBC # BLD: 0.02 K/UL (ref 0–0.2)
OTHER OBSERVATIONS: ABNORMAL
PH UR STRIP: 5.5 (ref 5–9)
PLATELET # BLD AUTO: 382 K/UL (ref 150–450)
PMV BLD AUTO: 9.7 FL (ref 9.4–12.3)
POTASSIUM SERPL-SCNC: 4.6 MMOL/L (ref 3.5–5.1)
PROT SERPL-MCNC: 7.3 G/DL (ref 6.3–8.2)
PROT UR STRIP-MCNC: NEGATIVE MG/DL
RBC # BLD AUTO: 4.81 M/UL (ref 4.05–5.2)
RBC #/AREA URNS HPF: ABNORMAL /HPF
SODIUM SERPL-SCNC: 139 MMOL/L (ref 136–145)
SP GR UR REFRACTOMETRY: 1.01 (ref 1–1.02)
UROBILINOGEN UR QL STRIP.AUTO: 0.2 EU/DL (ref 0.2–1)
WBC # BLD AUTO: 9.7 K/UL (ref 4.3–11.1)
WBC URNS QL MICRO: ABNORMAL /HPF

## 2025-07-03 PROCEDURE — 3074F SYST BP LT 130 MM HG: CPT | Performed by: NURSE PRACTITIONER

## 2025-07-03 PROCEDURE — 99214 OFFICE O/P EST MOD 30 MIN: CPT | Performed by: NURSE PRACTITIONER

## 2025-07-03 PROCEDURE — 3078F DIAST BP <80 MM HG: CPT | Performed by: NURSE PRACTITIONER

## 2025-07-03 RX ORDER — CEPHALEXIN 500 MG/1
500 CAPSULE ORAL 2 TIMES DAILY
Qty: 10 CAPSULE | Refills: 0 | Status: SHIPPED | OUTPATIENT
Start: 2025-07-03 | End: 2025-07-08

## 2025-07-03 ASSESSMENT — ENCOUNTER SYMPTOMS
NAUSEA: 0
VOMITING: 0
ABDOMINAL PAIN: 0
BACK PAIN: 1

## 2025-07-03 NOTE — PROGRESS NOTES
7/3/2025 2:46 PM  Location:[facility] PHYSICIAN SERVICES  [facility] [place] MEDICINE  [place]  Patient #:  [id number]  YOB: 1960    Reason for Visit  Urinary Frequency Patient presents in the office today c/o urinary frequency and foul odor of urine for several weeks     History of Present Illness  The patient is a 64-year-old female who presents with urinary frequency and odor for several weeks.    She reports an unusual odor in her urine, which she describes as strong and ammonia-like. Her urine appears darker than usual, but she has not noticed any blood. She has been experiencing increased frequency of urination. She has been attempting to increase her fluid intake, but notes that her mouth feels dry. She does not experience any vaginal discharge or bleeding, nor any burning sensation during urination. She conducted a home test for urinary tract infection, which returned a positive result.    She has been experiencing severe pain in her left hip, which has significantly limited her mobility. The pain occasionally radiates down her leg. Despite taking prescribed medications, she has found no relief. She has not experienced any falls or leg swelling. She has an upcoming appointment with Dr. Reynoso on 08/13/2025. She is scheduled for hip replacement surgery in the fall.   She continues to take gabapentin, one tablet in the morning and one at night. She was previously prescribed tramadol by Dr. Stallings, but it did not alleviate her hip pain. She was advised to discontinue Arthrotec, which initially provided some relief but later became ineffective.    She was recently hospitalized due to a heart rate of 218 beats per minute, during which her metoprolol dosage was adjusted. She was discharged after a few days. She has since followed up with her cardiologist. She reports feeling fatigued but does not experience dizziness or palpitations.    During her hospital stay, she was diagnosed with anemia and

## 2025-07-04 ENCOUNTER — RESULTS FOLLOW-UP (OUTPATIENT)
Dept: INTERNAL MEDICINE CLINIC | Facility: CLINIC | Age: 65
End: 2025-07-04

## 2025-07-04 DIAGNOSIS — M25.562 CHRONIC PAIN OF BOTH KNEES: ICD-10-CM

## 2025-07-04 DIAGNOSIS — G89.29 CHRONIC PAIN OF BOTH KNEES: ICD-10-CM

## 2025-07-04 DIAGNOSIS — N17.9 AKI (ACUTE KIDNEY INJURY): Primary | ICD-10-CM

## 2025-07-04 DIAGNOSIS — R60.9 DEPENDENT EDEMA: ICD-10-CM

## 2025-07-04 DIAGNOSIS — G89.4 CHRONIC PAIN SYNDROME: ICD-10-CM

## 2025-07-04 DIAGNOSIS — M15.9 GENERALIZED OSTEOARTHRITIS: Primary | ICD-10-CM

## 2025-07-04 DIAGNOSIS — M25.561 CHRONIC PAIN OF BOTH KNEES: ICD-10-CM

## 2025-07-04 RX ORDER — POTASSIUM CHLORIDE 1500 MG/1
20 TABLET, EXTENDED RELEASE ORAL DAILY
Qty: 180 TABLET | Refills: 3
Start: 2025-07-04

## 2025-07-04 RX ORDER — FUROSEMIDE 40 MG/1
40 TABLET ORAL DAILY
Qty: 60 TABLET | Refills: 3
Start: 2025-07-04 | End: 2025-11-01

## 2025-07-06 LAB
BACTERIA SPEC CULT: NORMAL
SERVICE CMNT-IMP: NORMAL

## 2025-07-09 NOTE — PROGRESS NOTES
Name: Karolina Dumont  YOB: 1960  Gender: female  MRN: [id number]  Date of Encounter:  7/10/2025       CHIEF COMPLAINT:     Chief Complaint   Patient presents with    Injections     Left Hip        SUBJECTIVE/OBJECTIVE:      HPI:    Patient is a 64 y.o. pleasant female who presents today for an injection of the left hip.    Recall Hx:  She presented 3/31/2025 for chronic left hip and knee pain that have been ongoing for years.  She has a known history of arthritis.  She is trying to wait until she turns 65 for consideration of arthroplasty.  X-rays of the hip showed severe left hip OA and moderate knee OA.    3/31/25: Initial eval. FAJ injection performed.     7/10/25: She had a few weeks of pain relief from her last cortisone injection and is requesting another.  She is increasingly debilitated by this pain.  She tells me she has had a change of heart and is wanting to pursue hip replacement if this is an option    ASSESSMENT/PLAN:     Encounter Diagnoses   Name Primary?    Primary osteoarthritis of left hip Yes    Left hip pain         I do recommend she have an evaluation with total joint.  She is unlikely to be 65 by the time surgery scheduling dates are an option.  I did warn her that she cannot get a replacement 3 months prior to an injection but she did wish to proceed with such an injection at my recommendation given our surgery schedule.    We discussed left hip joint injection today, risks and benefits of injection including pain with injection, bleeding, damage to surrounding structures, infection, elevation in blood glucose, steroid flare. They wished to proceed with injection today and this was performed.  Of note she had immediate relief from the lidocaine from the injection.    Orders Placed This Encounter    US ARTHR/ASP/INJ MAJOR JNT/BURSA LEFT     Standing Status:   Future     Number of Occurrences:   1     Expected Date:   7/10/2025     Expiration Date:   7/10/2026

## 2025-07-10 ENCOUNTER — OFFICE VISIT (OUTPATIENT)
Dept: ORTHOPEDIC SURGERY | Age: 65
End: 2025-07-10
Payer: COMMERCIAL

## 2025-07-10 DIAGNOSIS — M25.552 LEFT HIP PAIN: ICD-10-CM

## 2025-07-10 DIAGNOSIS — M16.12 PRIMARY OSTEOARTHRITIS OF LEFT HIP: Primary | ICD-10-CM

## 2025-07-10 PROCEDURE — 20611 DRAIN/INJ JOINT/BURSA W/US: CPT | Performed by: STUDENT IN AN ORGANIZED HEALTH CARE EDUCATION/TRAINING PROGRAM

## 2025-07-10 RX ORDER — METHYLPREDNISOLONE ACETATE 80 MG/ML
80 INJECTION, SUSPENSION INTRA-ARTICULAR; INTRALESIONAL; INTRAMUSCULAR; SOFT TISSUE ONCE
Status: COMPLETED | OUTPATIENT
Start: 2025-07-10 | End: 2025-07-10

## 2025-07-10 RX ADMIN — METHYLPREDNISOLONE ACETATE 80 MG: 80 INJECTION, SUSPENSION INTRA-ARTICULAR; INTRALESIONAL; INTRAMUSCULAR; SOFT TISSUE at 08:51

## 2025-07-18 ENCOUNTER — OFFICE VISIT (OUTPATIENT)
Dept: ORTHOPEDIC SURGERY | Age: 65
End: 2025-07-18
Payer: COMMERCIAL

## 2025-07-18 DIAGNOSIS — G89.29 CHRONIC LEFT-SIDED LOW BACK PAIN, UNSPECIFIED WHETHER SCIATICA PRESENT: ICD-10-CM

## 2025-07-18 DIAGNOSIS — M54.50 CHRONIC LEFT-SIDED LOW BACK PAIN, UNSPECIFIED WHETHER SCIATICA PRESENT: ICD-10-CM

## 2025-07-18 DIAGNOSIS — E66.01 MORBID OBESITY (HCC): Primary | ICD-10-CM

## 2025-07-18 DIAGNOSIS — M16.12 UNILATERAL PRIMARY OSTEOARTHRITIS, LEFT HIP: ICD-10-CM

## 2025-07-18 PROCEDURE — 99215 OFFICE O/P EST HI 40 MIN: CPT | Performed by: ORTHOPAEDIC SURGERY

## 2025-07-18 NOTE — PROGRESS NOTES
Name: Karolina Dumont  YOB: 1960  Gender: female  MRN: [id number]    CC: Left hip pain    HPI: Karolina Dumont is a 64 y.o. female who presents a greater than 6-month history of progressively worsening left hip and groin pain.  She has been seeing Dr. Deanna Quevedo and has had some intra-articular injections provided which have not given her much relief.  These were done in hopes of putting surgery off for some period of time as the patient was aware that surgery was probably done of the necessary.  Unfortunately is of gotten worse.  She has pain with weightbearing and difficulty lifting her leg and putting her shoes and socks on.  She is having pain at night.  She is using a cane now because of the necessity for support.  She has been provided by her primary care doctor with Norco 5 and there is no longer helping as well.  With this significant progression of pain and dysfunction she comes in today for further recommendations and treatment.    She does have a history of cardiac concerns with palpitations.  She has seen Dr. Marina recently and it sounds as though her cardiac condition is relatively stable.  She is on no oral anticoagulation regularly other than a single baby aspirin    History was obtained from patient and spouse    ROS/Meds/PSH/PMH/FH/SH: I personally reviewed the patients standard intake form.  Below are the pertinents    Tobacco:  reports that she has never smoked. She has never been exposed to tobacco smoke. She has never used smokeless tobacco.  Past Medical History:   Diagnosis Date    Anxiety and depression 12/27/2018    Arthritis of left knee 12/2/2020    Autoimmune disease     fibromyalgia    CAD (coronary artery disease) 2006    mi    Chest pain     Coagulation defects     hx of dic ? pe    GERD (gastroesophageal reflux disease)     Hypertension     Palpitations 8/22/2016    Aug 2014 - K+ 3.4, Mg 2.1 7 day monitor - single 8 beat run of atrial ectopy, asymptomatic Feb 2016 -

## 2025-07-21 ENCOUNTER — TELEPHONE (OUTPATIENT)
Dept: ORTHOPEDIC SURGERY | Age: 65
End: 2025-07-21

## 2025-07-21 NOTE — TELEPHONE ENCOUNTER
----- Message from Collette BOYLE sent at 7/21/2025  3:03 PM EDT -----  Regarding: abiodun Specialty Message to Provider  Specialty Message to Provider    Relationship to Patient: Self     Patient Message: ready to schedule Abiodun ricardo  --------------------------------------------------------------------------------------------------------------------------    Call Back Information: OK to leave message on voicemail  Preferred Call Back Number: Phone [phone]

## 2025-07-22 DIAGNOSIS — M16.12 UNILATERAL PRIMARY OSTEOARTHRITIS, LEFT HIP: Primary | ICD-10-CM

## 2025-07-23 ENCOUNTER — TELEPHONE (OUTPATIENT)
Dept: INTERNAL MEDICINE CLINIC | Facility: CLINIC | Age: 65
End: 2025-07-23

## 2025-07-29 NOTE — PROGRESS NOTES
Chronic Pain Consult Note      Plan:     A comprehensive pain management plan may consist of the following: Testing, Therapy, Medications, Interventions, Consults, and Follow up.    Left hip osteoarthritis  Status post multiple hip injections with relief from 1/2 injection  Scheduled for replacement early September  Provided patient home exercise sheets  Increase gabapentin to 100 mg 1-2 tabs up to 3 times daily depending upon toleration.  Patient already taking Celebrex from outside provider  Patient already taking Norco from outside provider.  Take medication as directed no signs of abuse or addiction.  Lumbar spondylolisthesis  Plain films  No MRIs  Pain at this time not radiating or has primary generator of pain from low back  Provided patient with home exercise sheets    General Recommendations: The pain condition that the patient suffers from is best treated with a multidisciplinary approach that involves an increase in physical activity to prevent de-conditioning and worsening of the pain cycle, as well as psychological counseling (formal and/or informal) to address the co morbid psychological effects of pain. Treatment will often involve judicious use of pain medications and interventional procedures to decrease the pain, allowing the patient to participate in the physical activity that will ultimately produce long-lasting pain reductions. When surgical treatments or more invasive procedural options such as basivertebral nerve ablation are considered they will only be options for patients that do not suffer from any mental health issues or psychological factors such as drug or alcohol dependency that would otherwise preclude them from proceeding with these therapies. The goal of the multidisciplinary approach is to return the patient to a higher level of overall function and to restore their ability to perform activities of daily living.      Referring Provider: Jie Mojica A*  Assessment:

## 2025-07-30 ENCOUNTER — OFFICE VISIT (OUTPATIENT)
Dept: BEHAVIORAL/MENTAL HEALTH CLINIC | Facility: CLINIC | Age: 65
End: 2025-07-30
Payer: COMMERCIAL

## 2025-07-30 VITALS
BODY MASS INDEX: 37.45 KG/M2 | HEIGHT: 66 IN | WEIGHT: 233 LBS | OXYGEN SATURATION: 97 % | SYSTOLIC BLOOD PRESSURE: 106 MMHG | DIASTOLIC BLOOD PRESSURE: 66 MMHG | HEART RATE: 56 BPM

## 2025-07-30 DIAGNOSIS — F41.9 ANXIETY DISORDER, UNSPECIFIED TYPE: ICD-10-CM

## 2025-07-30 DIAGNOSIS — F33.1 MAJOR DEPRESSIVE DISORDER, RECURRENT, MODERATE (HCC): Primary | ICD-10-CM

## 2025-07-30 PROCEDURE — 90792 PSYCH DIAG EVAL W/MED SRVCS: CPT | Performed by: PSYCHIATRY & NEUROLOGY

## 2025-07-30 RX ORDER — BUSPIRONE HYDROCHLORIDE 15 MG/1
15 TABLET ORAL 3 TIMES DAILY
Qty: 90 TABLET | Refills: 0 | Status: SHIPPED | OUTPATIENT
Start: 2025-07-30

## 2025-07-30 RX ORDER — LORAZEPAM 0.5 MG/1
TABLET ORAL
COMMUNITY
Start: 2025-07-18

## 2025-07-30 RX ORDER — ARIPIPRAZOLE 5 MG/1
5 TABLET ORAL DAILY
Qty: 30 TABLET | Refills: 1 | Status: SHIPPED | OUTPATIENT
Start: 2025-07-30

## 2025-07-30 RX ORDER — DULOXETIN HYDROCHLORIDE 30 MG/1
30 CAPSULE, DELAYED RELEASE ORAL DAILY
Qty: 30 CAPSULE | Refills: 1 | Status: SHIPPED | OUTPATIENT
Start: 2025-07-30

## 2025-07-30 RX ORDER — VILAZODONE HYDROCHLORIDE 20 MG/1
20 TABLET ORAL DAILY
Qty: 30 TABLET | Refills: 0 | Status: SHIPPED | OUTPATIENT
Start: 2025-07-30

## 2025-07-30 ASSESSMENT — PATIENT HEALTH QUESTIONNAIRE - PHQ9
SUM OF ALL RESPONSES TO PHQ QUESTIONS 1-9: 0
2. FEELING DOWN, DEPRESSED OR HOPELESS: NOT AT ALL
SUM OF ALL RESPONSES TO PHQ QUESTIONS 1-9: 0
SUM OF ALL RESPONSES TO PHQ QUESTIONS 1-9: 0
1. LITTLE INTEREST OR PLEASURE IN DOING THINGS: NOT AT ALL
SUM OF ALL RESPONSES TO PHQ QUESTIONS 1-9: 0

## 2025-07-30 NOTE — PROGRESS NOTES
irritable Several days   Feeling afraid as if something awful might happen Not at all   YARELI-7 Total Score 5   How difficult have these problems made it for you to do your work, take care of things at home, or get along with other people? Not difficult at all     Return to Clinic: 1 month OR sooner if needed    I have reviewed the [place]CR[facility] database report for this patient as required for prescription of controlled substances and did not note any discrepancies.  Medication side effects were discussed and benefits v. risks were presented. Treatment plan was discussed and agreed to by patient.  Diagnoses considered in this plan are \"working diagnoses\" and subject to change with additional information obtained over the course of additional time spent with patient and/or additional collateral information.  Currently, considering risks and protective factors, this patient has a:  low risk of suicide  low risk of homicide  Crisis Plan:  Patient encouraged to call 911 and /or go to the closest Emergency Department in case of agitation, severe anxiety, psychosis, sandra, suicidal or homicidal urges, worrisome side effects to medications or other emergencies. Patient verbalized understanding of this plan and agreed to it.  This note has been completed using [employer] and while attempts have been made to ensure accuracy, certain words and phrases may not be transcribed as intended.

## 2025-08-01 ENCOUNTER — OFFICE VISIT (OUTPATIENT)
Age: 65
End: 2025-08-01
Payer: COMMERCIAL

## 2025-08-01 DIAGNOSIS — M16.12 OSTEOARTHRITIS OF LEFT HIP, UNSPECIFIED OSTEOARTHRITIS TYPE: Primary | ICD-10-CM

## 2025-08-01 PROCEDURE — 99204 OFFICE O/P NEW MOD 45 MIN: CPT | Performed by: ANESTHESIOLOGY

## 2025-08-12 DIAGNOSIS — M16.12 UNILATERAL PRIMARY OSTEOARTHRITIS, LEFT HIP: ICD-10-CM

## 2025-08-12 LAB
ALBUMIN SERPL-MCNC: 4.3 G/DL (ref 3.2–4.6)
ALBUMIN/GLOB SERPL: 1.6 (ref 1–1.9)
ALP SERPL-CCNC: 94 U/L (ref 35–104)
ALT SERPL-CCNC: 42 U/L (ref 8–45)
ANION GAP SERPL CALC-SCNC: 14 MMOL/L (ref 7–16)
AST SERPL-CCNC: 30 U/L (ref 15–37)
BASOPHILS # BLD: 0.07 K/UL (ref 0–0.2)
BASOPHILS NFR BLD: 0.8 % (ref 0–2)
BILIRUB SERPL-MCNC: 0.7 MG/DL (ref 0–1.2)
BUN SERPL-MCNC: 17 MG/DL (ref 8–23)
CALCIUM SERPL-MCNC: 9.9 MG/DL (ref 8.8–10.2)
CHLORIDE SERPL-SCNC: 100 MMOL/L (ref 98–107)
CO2 SERPL-SCNC: 27 MMOL/L (ref 20–29)
CREAT SERPL-MCNC: 1.39 MG/DL (ref 0.6–1.1)
DIFFERENTIAL METHOD BLD: ABNORMAL
EOSINOPHIL # BLD: 0.29 K/UL (ref 0–0.8)
EOSINOPHIL NFR BLD: 3.2 % (ref 0.5–7.8)
ERYTHROCYTE [DISTWIDTH] IN BLOOD BY AUTOMATED COUNT: 16.6 % (ref 11.9–14.6)
EST. AVERAGE GLUCOSE BLD GHB EST-MCNC: 115 MG/DL
GLOBULIN SER CALC-MCNC: 2.7 G/DL (ref 2.3–3.5)
GLUCOSE SERPL-MCNC: 91 MG/DL (ref 70–99)
HBA1C MFR BLD: 5.6 % (ref 0–5.6)
HCT VFR BLD AUTO: 44 % (ref 35.8–46.3)
HGB BLD-MCNC: 14 G/DL (ref 11.7–15.4)
IMM GRANULOCYTES # BLD AUTO: 0.05 K/UL (ref 0–0.5)
IMM GRANULOCYTES NFR BLD AUTO: 0.6 % (ref 0–5)
INR PPP: 1
LYMPHOCYTES # BLD: 1.7 K/UL (ref 0.5–4.6)
LYMPHOCYTES NFR BLD: 18.8 % (ref 13–44)
MCH RBC QN AUTO: 28.2 PG (ref 26.1–32.9)
MCHC RBC AUTO-ENTMCNC: 31.8 G/DL (ref 31.4–35)
MCV RBC AUTO: 88.7 FL (ref 82–102)
MONOCYTES # BLD: 0.56 K/UL (ref 0.1–1.3)
MONOCYTES NFR BLD: 6.2 % (ref 4–12)
NEUTS SEG # BLD: 6.35 K/UL (ref 1.7–8.2)
NEUTS SEG NFR BLD: 70.4 % (ref 43–78)
NRBC # BLD: 0 K/UL (ref 0–0.2)
PLATELET # BLD AUTO: 243 K/UL (ref 150–450)
PMV BLD AUTO: 9.8 FL (ref 9.4–12.3)
POTASSIUM SERPL-SCNC: 3.9 MMOL/L (ref 3.5–5.1)
PROT SERPL-MCNC: 7 G/DL (ref 6.3–8.2)
PROTHROMBIN TIME: 13.4 SEC (ref 11.3–14.9)
RBC # BLD AUTO: 4.96 M/UL (ref 4.05–5.2)
SODIUM SERPL-SCNC: 141 MMOL/L (ref 136–145)
WBC # BLD AUTO: 9 K/UL (ref 4.3–11.1)

## 2025-08-20 ENCOUNTER — OFFICE VISIT (OUTPATIENT)
Dept: INTERNAL MEDICINE CLINIC | Facility: CLINIC | Age: 65
End: 2025-08-20
Payer: COMMERCIAL

## 2025-08-20 VITALS
TEMPERATURE: 97.5 F | DIASTOLIC BLOOD PRESSURE: 58 MMHG | OXYGEN SATURATION: 96 % | HEART RATE: 59 BPM | HEIGHT: 66 IN | SYSTOLIC BLOOD PRESSURE: 121 MMHG | WEIGHT: 234.8 LBS | BODY MASS INDEX: 37.73 KG/M2 | RESPIRATION RATE: 18 BRPM

## 2025-08-20 DIAGNOSIS — N18.32 STAGE 3B CHRONIC KIDNEY DISEASE (HCC): ICD-10-CM

## 2025-08-20 DIAGNOSIS — N30.90 CYSTITIS: ICD-10-CM

## 2025-08-20 DIAGNOSIS — M15.9 GENERALIZED OSTEOARTHRITIS: ICD-10-CM

## 2025-08-20 DIAGNOSIS — N30.90 CYSTITIS: Primary | ICD-10-CM

## 2025-08-20 LAB
APPEARANCE UR: CLEAR
BACTERIA URNS QL MICRO: ABNORMAL /HPF
BILIRUB UR QL: NEGATIVE
COLOR UR: ABNORMAL
EPI CELLS #/AREA URNS HPF: ABNORMAL /HPF (ref 0–5)
GLUCOSE UR STRIP.AUTO-MCNC: NEGATIVE MG/DL
HGB UR QL STRIP: NEGATIVE
HYALINE CASTS URNS QL MICRO: ABNORMAL /LPF
KETONES UR QL STRIP.AUTO: NEGATIVE MG/DL
LEUKOCYTE ESTERASE UR QL STRIP.AUTO: ABNORMAL
NITRITE UR QL STRIP.AUTO: NEGATIVE
PH UR STRIP: 5.5 (ref 5–9)
PROT UR STRIP-MCNC: NEGATIVE MG/DL
RBC #/AREA URNS HPF: ABNORMAL /HPF (ref 0–5)
SP GR UR REFRACTOMETRY: 1.01 (ref 1–1.02)
UROBILINOGEN UR QL STRIP.AUTO: 0.2 EU/DL (ref 0.2–1)
WBC URNS QL MICRO: ABNORMAL /HPF (ref 0–4)

## 2025-08-20 PROCEDURE — 99213 OFFICE O/P EST LOW 20 MIN: CPT | Performed by: NURSE PRACTITIONER

## 2025-08-20 PROCEDURE — 3074F SYST BP LT 130 MM HG: CPT | Performed by: NURSE PRACTITIONER

## 2025-08-20 PROCEDURE — 3078F DIAST BP <80 MM HG: CPT | Performed by: NURSE PRACTITIONER

## 2025-08-20 RX ORDER — CEPHALEXIN 500 MG/1
500 CAPSULE ORAL 2 TIMES DAILY
Qty: 10 CAPSULE | Refills: 0 | Status: SHIPPED | OUTPATIENT
Start: 2025-08-20 | End: 2025-08-25

## 2025-08-20 RX ORDER — PAROXETINE 10 MG/1
10 TABLET, FILM COATED ORAL DAILY
COMMUNITY

## 2025-08-20 ASSESSMENT — ENCOUNTER SYMPTOMS
VOMITING: 0
NAUSEA: 0

## 2025-08-20 ASSESSMENT — PATIENT HEALTH QUESTIONNAIRE - PHQ9
4. FEELING TIRED OR HAVING LITTLE ENERGY: NEARLY EVERY DAY
8. MOVING OR SPEAKING SO SLOWLY THAT OTHER PEOPLE COULD HAVE NOTICED. OR THE OPPOSITE, BEING SO FIGETY OR RESTLESS THAT YOU HAVE BEEN MOVING AROUND A LOT MORE THAN USUAL: NOT AT ALL
5. POOR APPETITE OR OVEREATING: SEVERAL DAYS
3. TROUBLE FALLING OR STAYING ASLEEP: SEVERAL DAYS
5. POOR APPETITE OR OVEREATING: SEVERAL DAYS
1. LITTLE INTEREST OR PLEASURE IN DOING THINGS: SEVERAL DAYS
6. FEELING BAD ABOUT YOURSELF - OR THAT YOU ARE A FAILURE OR HAVE LET YOURSELF OR YOUR FAMILY DOWN: SEVERAL DAYS
SUM OF ALL RESPONSES TO PHQ QUESTIONS 1-9: 9
3. TROUBLE FALLING OR STAYING ASLEEP: SEVERAL DAYS
2. FEELING DOWN, DEPRESSED OR HOPELESS: SEVERAL DAYS
SUM OF ALL RESPONSES TO PHQ QUESTIONS 1-9: 9
4. FEELING TIRED OR HAVING LITTLE ENERGY: NEARLY EVERY DAY
SUM OF ALL RESPONSES TO PHQ QUESTIONS 1-9: 9
10. IF YOU CHECKED OFF ANY PROBLEMS, HOW DIFFICULT HAVE THESE PROBLEMS MADE IT FOR YOU TO DO YOUR WORK, TAKE CARE OF THINGS AT HOME, OR GET ALONG WITH OTHER PEOPLE: SOMEWHAT DIFFICULT
2. FEELING DOWN, DEPRESSED OR HOPELESS: SEVERAL DAYS
6. FEELING BAD ABOUT YOURSELF - OR THAT YOU ARE A FAILURE OR HAVE LET YOURSELF OR YOUR FAMILY DOWN: SEVERAL DAYS
SUM OF ALL RESPONSES TO PHQ QUESTIONS 1-9: 9
9. THOUGHTS THAT YOU WOULD BE BETTER OFF DEAD, OR OF HURTING YOURSELF: NOT AT ALL
9. THOUGHTS THAT YOU WOULD BE BETTER OFF DEAD, OR OF HURTING YOURSELF: NOT AT ALL
SUM OF ALL RESPONSES TO PHQ QUESTIONS 1-9: 9
1. LITTLE INTEREST OR PLEASURE IN DOING THINGS: SEVERAL DAYS
7. TROUBLE CONCENTRATING ON THINGS, SUCH AS READING THE NEWSPAPER OR WATCHING TELEVISION: SEVERAL DAYS
7. TROUBLE CONCENTRATING ON THINGS, SUCH AS READING THE NEWSPAPER OR WATCHING TELEVISION: SEVERAL DAYS
8. MOVING OR SPEAKING SO SLOWLY THAT OTHER PEOPLE COULD HAVE NOTICED. OR THE OPPOSITE - BEING SO FIDGETY OR RESTLESS THAT YOU HAVE BEEN MOVING AROUND A LOT MORE THAN USUAL: NOT AT ALL
10. IF YOU CHECKED OFF ANY PROBLEMS, HOW DIFFICULT HAVE THESE PROBLEMS MADE IT FOR YOU TO DO YOUR WORK, TAKE CARE OF THINGS AT HOME, OR GET ALONG WITH OTHER PEOPLE: SOMEWHAT DIFFICULT

## 2025-08-20 ASSESSMENT — ANXIETY QUESTIONNAIRES
5. BEING SO RESTLESS THAT IT IS HARD TO SIT STILL: SEVERAL DAYS
IF YOU CHECKED OFF ANY PROBLEMS ON THIS QUESTIONNAIRE, HOW DIFFICULT HAVE THESE PROBLEMS MADE IT FOR YOU TO DO YOUR WORK, TAKE CARE OF THINGS AT HOME, OR GET ALONG WITH OTHER PEOPLE: SOMEWHAT DIFFICULT
4. TROUBLE RELAXING: MORE THAN HALF THE DAYS
GAD7 TOTAL SCORE: 11
2. NOT BEING ABLE TO STOP OR CONTROL WORRYING: MORE THAN HALF THE DAYS
3. WORRYING TOO MUCH ABOUT DIFFERENT THINGS: MORE THAN HALF THE DAYS
3. WORRYING TOO MUCH ABOUT DIFFERENT THINGS: MORE THAN HALF THE DAYS
1. FEELING NERVOUS, ANXIOUS, OR ON EDGE: MORE THAN HALF THE DAYS
6. BECOMING EASILY ANNOYED OR IRRITABLE: SEVERAL DAYS
1. FEELING NERVOUS, ANXIOUS, OR ON EDGE: MORE THAN HALF THE DAYS
IF YOU CHECKED OFF ANY PROBLEMS ON THIS QUESTIONNAIRE, HOW DIFFICULT HAVE THESE PROBLEMS MADE IT FOR YOU TO DO YOUR WORK, TAKE CARE OF THINGS AT HOME, OR GET ALONG WITH OTHER PEOPLE: SOMEWHAT DIFFICULT
4. TROUBLE RELAXING: MORE THAN HALF THE DAYS
7. FEELING AFRAID AS IF SOMETHING AWFUL MIGHT HAPPEN: SEVERAL DAYS
6. BECOMING EASILY ANNOYED OR IRRITABLE: SEVERAL DAYS
5. BEING SO RESTLESS THAT IT IS HARD TO SIT STILL: SEVERAL DAYS
7. FEELING AFRAID AS IF SOMETHING AWFUL MIGHT HAPPEN: SEVERAL DAYS
2. NOT BEING ABLE TO STOP OR CONTROL WORRYING: MORE THAN HALF THE DAYS

## 2025-08-22 DIAGNOSIS — N30.90 CYSTITIS: Primary | ICD-10-CM

## 2025-08-22 LAB
BACTERIA SPEC CULT: ABNORMAL
BACTERIA SPEC CULT: ABNORMAL
SERVICE CMNT-IMP: ABNORMAL

## 2025-08-22 RX ORDER — CEPHALEXIN 500 MG/1
500 CAPSULE ORAL 2 TIMES DAILY
Qty: 4 CAPSULE | Refills: 0 | Status: SHIPPED | OUTPATIENT
Start: 2025-08-22 | End: 2025-08-24

## 2025-08-25 ENCOUNTER — PATIENT MESSAGE (OUTPATIENT)
Dept: ORTHOPEDIC SURGERY | Age: 65
End: 2025-08-25

## 2025-08-25 DIAGNOSIS — M16.12 UNILATERAL PRIMARY OSTEOARTHRITIS, LEFT HIP: Primary | ICD-10-CM

## 2025-08-25 RX ORDER — TRAMADOL HYDROCHLORIDE 50 MG/1
50 TABLET ORAL EVERY 6 HOURS PRN
Qty: 40 TABLET | Refills: 0 | Status: SHIPPED | OUTPATIENT
Start: 2025-08-25 | End: 2025-09-04

## 2025-08-25 RX ORDER — TRAMADOL HYDROCHLORIDE 50 MG/1
50 TABLET ORAL EVERY 6 HOURS PRN
Qty: 40 TABLET | Refills: 0 | Status: CANCELLED | OUTPATIENT
Start: 2025-08-25 | End: 2025-09-04

## 2025-08-27 ENCOUNTER — OFFICE VISIT (OUTPATIENT)
Dept: BEHAVIORAL/MENTAL HEALTH CLINIC | Facility: CLINIC | Age: 65
End: 2025-08-27
Payer: COMMERCIAL

## 2025-08-27 VITALS
DIASTOLIC BLOOD PRESSURE: 78 MMHG | WEIGHT: 234 LBS | BODY MASS INDEX: 37.61 KG/M2 | SYSTOLIC BLOOD PRESSURE: 120 MMHG | HEIGHT: 66 IN | OXYGEN SATURATION: 98 % | HEART RATE: 56 BPM

## 2025-08-27 DIAGNOSIS — F43.23 ADJUSTMENT DISORDER WITH MIXED ANXIETY AND DEPRESSED MOOD: ICD-10-CM

## 2025-08-27 DIAGNOSIS — F33.1 MAJOR DEPRESSIVE DISORDER, RECURRENT, MODERATE (HCC): ICD-10-CM

## 2025-08-27 DIAGNOSIS — F41.9 ANXIETY DISORDER, UNSPECIFIED TYPE: Primary | ICD-10-CM

## 2025-08-27 PROCEDURE — 99214 OFFICE O/P EST MOD 30 MIN: CPT | Performed by: PSYCHIATRY & NEUROLOGY

## 2025-08-27 PROCEDURE — 90833 PSYTX W PT W E/M 30 MIN: CPT | Performed by: PSYCHIATRY & NEUROLOGY

## 2025-08-27 PROCEDURE — 3074F SYST BP LT 130 MM HG: CPT | Performed by: PSYCHIATRY & NEUROLOGY

## 2025-08-27 PROCEDURE — 3078F DIAST BP <80 MM HG: CPT | Performed by: PSYCHIATRY & NEUROLOGY

## 2025-08-27 RX ORDER — BUSPIRONE HYDROCHLORIDE 10 MG/1
10 TABLET ORAL 3 TIMES DAILY
Qty: 90 TABLET | Refills: 1 | Status: SHIPPED | OUTPATIENT
Start: 2025-08-27

## 2025-08-27 RX ORDER — ARIPIPRAZOLE 10 MG/1
10 TABLET ORAL DAILY
Qty: 30 TABLET | Refills: 1 | Status: SHIPPED | OUTPATIENT
Start: 2025-08-27

## 2025-08-27 RX ORDER — DULOXETIN HYDROCHLORIDE 60 MG/1
60 CAPSULE, DELAYED RELEASE ORAL DAILY
Qty: 30 CAPSULE | Refills: 1 | Status: SHIPPED | OUTPATIENT
Start: 2025-08-27

## 2025-08-29 DIAGNOSIS — M16.12 UNILATERAL PRIMARY OSTEOARTHRITIS, LEFT HIP: Primary | ICD-10-CM

## 2025-08-29 RX ORDER — OXYCODONE HYDROCHLORIDE 5 MG/1
5-10 TABLET ORAL EVERY 4 HOURS PRN
Qty: 60 TABLET | Refills: 0 | Status: SHIPPED | OUTPATIENT
Start: 2025-08-29 | End: 2025-09-05

## 2025-08-29 RX ORDER — METHOCARBAMOL 750 MG/1
TABLET, FILM COATED ORAL
Qty: 40 TABLET | Refills: 0 | Status: SHIPPED | OUTPATIENT
Start: 2025-08-29

## 2025-08-29 RX ORDER — ONDANSETRON 4 MG/1
4 TABLET, FILM COATED ORAL EVERY 6 HOURS PRN
Qty: 30 TABLET | Refills: 0 | Status: SHIPPED | OUTPATIENT
Start: 2025-08-29

## 2025-09-02 ENCOUNTER — OUTSIDE SERVICES (OUTPATIENT)
Dept: ORTHOPEDIC SURGERY | Age: 65
End: 2025-09-02
Payer: COMMERCIAL

## 2025-09-02 DIAGNOSIS — M16.12 UNILATERAL PRIMARY OSTEOARTHRITIS, LEFT HIP: Primary | ICD-10-CM

## 2025-09-02 PROCEDURE — 27130 TOTAL HIP ARTHROPLASTY: CPT | Performed by: ORTHOPAEDIC SURGERY

## 2025-09-03 ENCOUNTER — TELEPHONE (OUTPATIENT)
Dept: ORTHOPEDIC SURGERY | Age: 65
End: 2025-09-03